# Patient Record
Sex: MALE | NOT HISPANIC OR LATINO | Employment: FULL TIME | URBAN - METROPOLITAN AREA
[De-identification: names, ages, dates, MRNs, and addresses within clinical notes are randomized per-mention and may not be internally consistent; named-entity substitution may affect disease eponyms.]

---

## 2017-01-19 ENCOUNTER — ALLSCRIPTS OFFICE VISIT (OUTPATIENT)
Dept: OTHER | Facility: OTHER | Age: 67
End: 2017-01-19

## 2017-02-22 ENCOUNTER — ALLSCRIPTS OFFICE VISIT (OUTPATIENT)
Dept: OTHER | Facility: OTHER | Age: 67
End: 2017-02-22

## 2017-04-19 ENCOUNTER — GENERIC CONVERSION - ENCOUNTER (OUTPATIENT)
Dept: OTHER | Facility: OTHER | Age: 67
End: 2017-04-19

## 2017-05-18 ENCOUNTER — HOSPITAL ENCOUNTER (OUTPATIENT)
Dept: RADIOLOGY | Facility: HOSPITAL | Age: 67
Discharge: HOME | End: 2017-05-18
Attending: ORTHOPAEDIC SURGERY
Payer: COMMERCIAL

## 2017-05-18 ENCOUNTER — TRANSCRIBE ORDERS (OUTPATIENT)
Dept: ADMINISTRATIVE | Facility: HOSPITAL | Age: 67
End: 2017-05-18

## 2017-05-18 ENCOUNTER — ALLSCRIPTS OFFICE VISIT (OUTPATIENT)
Dept: OTHER | Facility: OTHER | Age: 67
End: 2017-05-18

## 2017-05-18 DIAGNOSIS — M17.11 PRIMARY OSTEOARTHRITIS OF RIGHT KNEE: ICD-10-CM

## 2017-05-18 DIAGNOSIS — M17.12 PRIMARY OSTEOARTHRITIS OF LEFT KNEE: ICD-10-CM

## 2017-05-18 DIAGNOSIS — S46.119A STRAIN OF MUSCLE, FASCIA AND TENDON OF LONG HEAD OF BICEPS, UNSPECIFIED ARM, INITIAL ENCOUNTER: ICD-10-CM

## 2017-05-18 DIAGNOSIS — M75.22 BICIPITAL TENDINITIS OF LEFT SHOULDER: ICD-10-CM

## 2017-05-18 DIAGNOSIS — S46.112A RUPTURE OF TENDON OF BICEPS, LONG HEAD, LEFT, INITIAL ENCOUNTER: Primary | ICD-10-CM

## 2017-05-18 PROCEDURE — 73562 X-RAY EXAM OF KNEE 3: CPT

## 2017-05-25 ENCOUNTER — HOSPITAL ENCOUNTER (OUTPATIENT)
Dept: RADIOLOGY | Age: 67
Discharge: HOME | End: 2017-05-25
Payer: COMMERCIAL

## 2017-05-25 DIAGNOSIS — S46.119A STRAIN OF MUSCLE, FASCIA AND TENDON OF LONG HEAD OF BICEPS, UNSPECIFIED ARM, INITIAL ENCOUNTER: ICD-10-CM

## 2017-05-25 PROCEDURE — 73221 MRI JOINT UPR EXTREM W/O DYE: CPT

## 2017-05-26 ENCOUNTER — GENERIC CONVERSION - ENCOUNTER (OUTPATIENT)
Dept: OTHER | Facility: OTHER | Age: 67
End: 2017-05-26

## 2017-05-30 ENCOUNTER — ALLSCRIPTS OFFICE VISIT (OUTPATIENT)
Dept: OTHER | Facility: OTHER | Age: 67
End: 2017-05-30

## 2017-06-01 ENCOUNTER — APPOINTMENT (OUTPATIENT)
Dept: PHYSICAL THERAPY | Age: 67
End: 2017-06-01
Payer: COMMERCIAL

## 2017-06-01 ENCOUNTER — GENERIC CONVERSION - ENCOUNTER (OUTPATIENT)
Dept: OTHER | Facility: OTHER | Age: 67
End: 2017-06-01

## 2017-06-01 DIAGNOSIS — M75.22 BICIPITAL TENDINITIS OF LEFT SHOULDER: ICD-10-CM

## 2017-06-01 PROCEDURE — 97110 THERAPEUTIC EXERCISES: CPT

## 2017-06-01 PROCEDURE — 97162 PT EVAL MOD COMPLEX 30 MIN: CPT

## 2017-06-09 ENCOUNTER — APPOINTMENT (OUTPATIENT)
Dept: PHYSICAL THERAPY | Age: 67
End: 2017-06-09
Payer: COMMERCIAL

## 2017-06-09 PROCEDURE — 97110 THERAPEUTIC EXERCISES: CPT

## 2017-06-09 PROCEDURE — 97140 MANUAL THERAPY 1/> REGIONS: CPT

## 2017-06-12 ENCOUNTER — APPOINTMENT (OUTPATIENT)
Dept: PHYSICAL THERAPY | Age: 67
End: 2017-06-12
Payer: COMMERCIAL

## 2017-06-12 PROCEDURE — 97110 THERAPEUTIC EXERCISES: CPT

## 2017-06-12 PROCEDURE — 97140 MANUAL THERAPY 1/> REGIONS: CPT

## 2017-06-15 ENCOUNTER — APPOINTMENT (OUTPATIENT)
Dept: PHYSICAL THERAPY | Age: 67
End: 2017-06-15
Payer: COMMERCIAL

## 2017-06-20 ENCOUNTER — APPOINTMENT (OUTPATIENT)
Dept: PHYSICAL THERAPY | Age: 67
End: 2017-06-20
Payer: COMMERCIAL

## 2017-06-20 PROCEDURE — 97110 THERAPEUTIC EXERCISES: CPT

## 2017-06-20 PROCEDURE — 97140 MANUAL THERAPY 1/> REGIONS: CPT

## 2017-06-26 ENCOUNTER — ALLSCRIPTS OFFICE VISIT (OUTPATIENT)
Dept: OTHER | Facility: OTHER | Age: 67
End: 2017-06-26

## 2017-06-27 ENCOUNTER — APPOINTMENT (OUTPATIENT)
Dept: PHYSICAL THERAPY | Age: 67
End: 2017-06-27
Payer: COMMERCIAL

## 2017-06-27 ENCOUNTER — GENERIC CONVERSION - ENCOUNTER (OUTPATIENT)
Dept: OTHER | Facility: OTHER | Age: 67
End: 2017-06-27

## 2017-06-27 PROCEDURE — G8991 OTHER PT/OT GOAL STATUS: HCPCS

## 2017-06-27 PROCEDURE — 97110 THERAPEUTIC EXERCISES: CPT

## 2017-06-27 PROCEDURE — 97140 MANUAL THERAPY 1/> REGIONS: CPT

## 2017-06-27 PROCEDURE — G8990 OTHER PT/OT CURRENT STATUS: HCPCS

## 2017-08-28 ENCOUNTER — ALLSCRIPTS OFFICE VISIT (OUTPATIENT)
Dept: OTHER | Facility: OTHER | Age: 67
End: 2017-08-28

## 2017-08-28 DIAGNOSIS — Z12.5 ENCOUNTER FOR SCREENING FOR MALIGNANT NEOPLASM OF PROSTATE: ICD-10-CM

## 2017-08-28 DIAGNOSIS — Z12.11 ENCOUNTER FOR SCREENING FOR MALIGNANT NEOPLASM OF COLON: ICD-10-CM

## 2017-08-28 DIAGNOSIS — Z12.12 ENCOUNTER FOR SCREENING FOR MALIGNANT NEOPLASM OF RECTUM: ICD-10-CM

## 2017-09-05 ENCOUNTER — ALLSCRIPTS OFFICE VISIT (OUTPATIENT)
Dept: OTHER | Facility: OTHER | Age: 67
End: 2017-09-05

## 2017-09-13 ENCOUNTER — TRANSCRIBE ORDERS (OUTPATIENT)
Dept: ADMINISTRATIVE | Age: 67
End: 2017-09-13

## 2017-09-13 ENCOUNTER — APPOINTMENT (OUTPATIENT)
Dept: LAB | Age: 67
End: 2017-09-13
Payer: COMMERCIAL

## 2017-09-13 DIAGNOSIS — E78.5 OTHER AND UNSPECIFIED HYPERLIPIDEMIA: Primary | ICD-10-CM

## 2017-09-13 DIAGNOSIS — E78.5 OTHER AND UNSPECIFIED HYPERLIPIDEMIA: ICD-10-CM

## 2017-09-13 LAB
ALBUMIN SERPL BCP-MCNC: 3.4 G/DL (ref 3.5–5)
ALP SERPL-CCNC: 54 U/L (ref 46–116)
ALT SERPL W P-5'-P-CCNC: 21 U/L (ref 12–78)
ANION GAP SERPL CALCULATED.3IONS-SCNC: 5 MMOL/L (ref 4–13)
AST SERPL W P-5'-P-CCNC: 21 U/L (ref 5–45)
BASOPHILS # BLD AUTO: 0.01 THOUSANDS/ΜL (ref 0–0.1)
BASOPHILS NFR BLD AUTO: 0 % (ref 0–1)
BILIRUB SERPL-MCNC: 0.82 MG/DL (ref 0.2–1)
BUN SERPL-MCNC: 16 MG/DL (ref 5–25)
CALCIUM SERPL-MCNC: 8.8 MG/DL (ref 8.3–10.1)
CHLORIDE SERPL-SCNC: 103 MMOL/L (ref 100–108)
CHOLEST SERPL-MCNC: 159 MG/DL (ref 50–200)
CO2 SERPL-SCNC: 30 MMOL/L (ref 21–32)
CREAT SERPL-MCNC: 1.02 MG/DL (ref 0.6–1.3)
EOSINOPHIL # BLD AUTO: 0.07 THOUSAND/ΜL (ref 0–0.61)
EOSINOPHIL NFR BLD AUTO: 1 % (ref 0–6)
ERYTHROCYTE [DISTWIDTH] IN BLOOD BY AUTOMATED COUNT: 13 % (ref 11.6–15.1)
GFR SERPL CREATININE-BSD FRML MDRD: 75 ML/MIN/1.73SQ M
GLUCOSE P FAST SERPL-MCNC: 90 MG/DL (ref 65–99)
HCT VFR BLD AUTO: 45.7 % (ref 36.5–49.3)
HCV AB SER QL: NORMAL
HDLC SERPL-MCNC: 80 MG/DL (ref 40–60)
HGB BLD-MCNC: 15.4 G/DL (ref 12–17)
LDLC SERPL CALC-MCNC: 64 MG/DL (ref 0–100)
LYMPHOCYTES # BLD AUTO: 2.26 THOUSANDS/ΜL (ref 0.6–4.47)
LYMPHOCYTES NFR BLD AUTO: 34 % (ref 14–44)
MCH RBC QN AUTO: 29.7 PG (ref 26.8–34.3)
MCHC RBC AUTO-ENTMCNC: 33.7 G/DL (ref 31.4–37.4)
MCV RBC AUTO: 88 FL (ref 82–98)
MONOCYTES # BLD AUTO: 0.71 THOUSAND/ΜL (ref 0.17–1.22)
MONOCYTES NFR BLD AUTO: 11 % (ref 4–12)
NEUTROPHILS # BLD AUTO: 3.58 THOUSANDS/ΜL (ref 1.85–7.62)
NEUTS SEG NFR BLD AUTO: 54 % (ref 43–75)
NRBC BLD AUTO-RTO: 0 /100 WBCS
PLATELET # BLD AUTO: 236 THOUSANDS/UL (ref 149–390)
PMV BLD AUTO: 10.5 FL (ref 8.9–12.7)
POTASSIUM SERPL-SCNC: 4.2 MMOL/L (ref 3.5–5.3)
PROT SERPL-MCNC: 6.5 G/DL (ref 6.4–8.2)
PSA SERPL-MCNC: 1.4 NG/ML (ref 0–4)
RBC # BLD AUTO: 5.18 MILLION/UL (ref 3.88–5.62)
SODIUM SERPL-SCNC: 138 MMOL/L (ref 136–145)
T4 FREE SERPL-MCNC: 1.13 NG/DL (ref 0.76–1.46)
TRIGL SERPL-MCNC: 74 MG/DL
TSH SERPL DL<=0.05 MIU/L-ACNC: 1.64 UIU/ML (ref 0.36–3.74)
WBC # BLD AUTO: 6.65 THOUSAND/UL (ref 4.31–10.16)

## 2017-09-13 PROCEDURE — 85025 COMPLETE CBC W/AUTO DIFF WBC: CPT

## 2017-09-13 PROCEDURE — 80061 LIPID PANEL: CPT

## 2017-09-13 PROCEDURE — 80053 COMPREHEN METABOLIC PANEL: CPT

## 2017-09-13 PROCEDURE — G0103 PSA SCREENING: HCPCS

## 2017-09-13 PROCEDURE — 86803 HEPATITIS C AB TEST: CPT

## 2017-09-13 PROCEDURE — 84439 ASSAY OF FREE THYROXINE: CPT

## 2017-09-13 PROCEDURE — 84443 ASSAY THYROID STIM HORMONE: CPT

## 2017-09-13 PROCEDURE — 36415 COLL VENOUS BLD VENIPUNCTURE: CPT

## 2017-09-14 ENCOUNTER — GENERIC CONVERSION - ENCOUNTER (OUTPATIENT)
Dept: OTHER | Facility: OTHER | Age: 67
End: 2017-09-14

## 2017-10-02 ENCOUNTER — APPOINTMENT (OUTPATIENT)
Dept: LAB | Facility: HOSPITAL | Age: 67
End: 2017-10-02
Payer: COMMERCIAL

## 2017-10-02 DIAGNOSIS — Z12.12 ENCOUNTER FOR SCREENING FOR MALIGNANT NEOPLASM OF RECTUM: ICD-10-CM

## 2017-10-02 DIAGNOSIS — Z12.5 ENCOUNTER FOR SCREENING FOR MALIGNANT NEOPLASM OF PROSTATE: ICD-10-CM

## 2017-10-02 DIAGNOSIS — Z12.11 ENCOUNTER FOR SCREENING FOR MALIGNANT NEOPLASM OF COLON: ICD-10-CM

## 2017-10-02 LAB — HEMOCCULT STL QL IA: NEGATIVE

## 2017-10-02 PROCEDURE — G0328 FECAL BLOOD SCRN IMMUNOASSAY: HCPCS

## 2017-10-03 ENCOUNTER — GENERIC CONVERSION - ENCOUNTER (OUTPATIENT)
Dept: OTHER | Facility: OTHER | Age: 67
End: 2017-10-03

## 2017-10-31 ENCOUNTER — HOSPITAL ENCOUNTER (OUTPATIENT)
Dept: RADIOLOGY | Facility: HOSPITAL | Age: 67
Discharge: HOME | End: 2017-10-31
Attending: ORTHOPAEDIC SURGERY
Payer: COMMERCIAL

## 2017-10-31 ENCOUNTER — GENERIC CONVERSION - ENCOUNTER (OUTPATIENT)
Dept: OTHER | Facility: OTHER | Age: 67
End: 2017-10-31

## 2017-10-31 DIAGNOSIS — M25.562 LEFT KNEE PAIN: ICD-10-CM

## 2017-10-31 DIAGNOSIS — M17.12 PRIMARY OSTEOARTHRITIS OF LEFT KNEE: ICD-10-CM

## 2017-10-31 PROCEDURE — 73562 X-RAY EXAM OF KNEE 3: CPT

## 2017-12-09 ENCOUNTER — GENERIC CONVERSION - ENCOUNTER (OUTPATIENT)
Dept: OTHER | Facility: OTHER | Age: 67
End: 2017-12-09

## 2017-12-10 ENCOUNTER — OFFICE VISIT (OUTPATIENT)
Dept: URGENT CARE | Age: 67
End: 2017-12-10
Payer: COMMERCIAL

## 2017-12-10 PROCEDURE — 99203 OFFICE O/P NEW LOW 30 MIN: CPT | Performed by: FAMILY MEDICINE

## 2017-12-11 NOTE — PROGRESS NOTES
Assessment    1  Acute upper respiratory infection (465 9) (J06 9)   2  Acute sinusitis (461 9) (J01 90)    Plan  Acute sinusitis    · Amoxicillin-Pot Clavulanate 875-125 MG Oral Tablet (Augmentin); TAKE 1 TABLETTWICE DAILY UNTIL FINISHED  Acute upper respiratory infection    · Benzonatate 200 MG Oral Capsule; TAKE 1 CAPSULE 2-3 TIMES DAILY    Discussion/Summary  Discussion Summary:   Augmentin twice a day until finished ( please take probiotics)  Perles 2 to 3 times a day (every 8-12 hours) as needed for cough  Flonase nasal spray 1 spray in each nostril twice a day  and swish mouth with warm salt water or mouthwash  or ibuprofen ( Advil / Motrin ) as needed  follow-up with family physician as needed  Medication Side Effects Reviewed: Possible side effects of new medications were reviewed with the patient/guardian today  Understands and agrees with treatment plan: The treatment plan was reviewed with the patient/guardian  The patient/guardian understands and agrees with the treatment plan   Counseling Documentation With Imm: The patient was counseled regarding  Follow Up Instructions: Follow Up with your Primary Care Provider in 7-10 days  If your symptoms worsen, go to the nearest [place] [address] Emergency Department  Chief Complaint    1  Cough  Chief Complaint Free Text Note Form: pt reports cough and fever for 3 days, unrelieved with OTC meds      History of Present Illness  HPI: Fever, cough, sinus pressure   Hospital Based Practices Required Assessment:  Pain Assessment  the patient states they have pain  The pain is located in the cough  The patient describes the pain as aching  (on a scale of 0 to 10, the patient rates the pain at 4 )  Abuse And Domestic Violence Screen   Yes, the patient is safe at home  -- The patient states no one is hurting them  Depression And Suicide Screen  No, the patient has not had thoughts of hurting themself  No, the patient has not felt depressed in the past 7 days  Prefered Language is  [place]  Primary Language is  English  Review of Systems  Focused-Male:  Constitutional: fever, but-- as noted in HPI   ENT: sore throat-- and-- nasal discharge, but-- as noted in HPI  Cardiovascular: no complaints of slow or fast heart rate, no chest pain, no palpitations, no leg claudication or lower extremity edema  Respiratory: cough, but-- as noted in HPI  Gastrointestinal: no complaints of abdominal pain, no constipation, no nausea or vomiting, no diarrhea or bloody stools  Genitourinary: no complaints of dysuria or incontinence, no hesitancy, no nocturia, no genital lesion, no inadequacy of penile erection  Musculoskeletal: no complaints of arthralgia, no myalgia, no joint swelling or stiffness, no limb pain or swelling  Integumentary: no complaints of skin rash or lesion, no itching or dry skin, no skin wounds  Neurological: no complaints of headache, no confusion, no numbness or tingling, no dizziness or fainting  ROS Reviewed:   ROS reviewed  Active Problems  1  Acquired hypothyroidism (244 9) (E03 9)   2  Acute sinusitis, recurrence not specified, unspecified location (461 9) (J01 90)   3  Advance directive discussed with patient (V65 49) (Z71 89)   4  Biceps tendinitis, left (726 12) (M75 22)   5  Biceps tendon tear (840 8) (S46 219A)   6  Chronic GERD (530 81) (K21 9)   7  Encounter for colorectal cancer screening (V76 51) (Z12 11,Z12 12)   8  Encounter for prostate cancer screening (V76 44) (Z12 5)   9  Folliculitis (159 8) (J09 2)   10  Headache (784 0) (R51)   11  Hyperlipidemia (272 4) (E78 5)   12  Hypertension (401 9) (I10)   13  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)   14  Knee pain (719 46) (M25 569)   15  Need for hepatitis A vaccination (V05 3) (Z23)   16  Need for hepatitis C screening test (V73 89) (Z11 59)   17  Need for prophylactic vaccination and inoculation against influenza (V04 81) (Z23)   18   Need for vaccination with 13-polyvalent pneumococcal conjugate vaccine (V03 82) (Z23)   19  Osteoarthrosis, localized, primary, knee, right (715 16) (M17 11)   20  Primary localized osteoarthritis of left knee (715 16) (M17 12)   21  Rash (782 1) (R21)   22  Screening for genitourinary condition (V81 6) (Z13 89)   23  Skin macule (709 8) (L98 8)    Past Medical History  1  History of Roberts's esophagus (530 85) (K22 70)  Active Problems And Past Medical History Reviewed: The active problems and past medical history were reviewed and updated today  Family History  Family History Reviewed: The family history was reviewed and updated today  Social History     · Never a smoker   · Never smoked  Social History Reviewed: The social history was reviewed and updated today  The social history was reviewed and is unchanged  Surgical History  Surgical History Reviewed: The surgical history was reviewed and updated today  Current Meds   1  Aspirin 81 MG TABS Recorded   2  Clobetasol Propionate 0 05 % External Cream; APPLY SPARINGLY TO AFFECTED AREA(S) TWICE DAILY; Therapy: 49WWN2521 to (Last Rx:54Sps2839)  Requested for: 98Luc8118 Ordered   3  Latanoprost 0 005 % Ophthalmic Solution Recorded   4  Levothyroxine Sodium 50 MCG Oral Tablet; TAKE 1 TABLET BY MOUTH EVERY DAY ON AN EMPTY STOMACH; Therapy: 62NQF4471 to (Shalonda Medico)  Requested for: 64ECZ3949; Last Rx:27Nov2017 Ordered   5  Lipitor 10 MG Oral Tablet; TAKE 1 TABLET AT BEDTIME; Therapy: 93Twj6253 to (Evaluate:14Jun2018)  Requested for: 21HNV8980; Last Rx:19Jun2017 Ordered   6  Losartan Potassium 50 MG Oral Tablet; TAKE 1 TABLET DAILY; Therapy: 06IAJ8351 to (Evaluate:08Jso5126); Last Rx:02Oct2015 Ordered   7  NexIUM 20 MG Oral Capsule Delayed Release; Therapy: (Recorded:88Zfo3879) to Recorded   8  Rizatriptan Benzoate 10 MG Oral Tablet; TAKE 1 TABLET AT ONSET OF HEADACHE  MAY REPEAT EVERY 2 HOURS AS NEEDED   MAXIMUM 3 TABLETS IN 24 HOURS  Requested for: 27Nov2017; Last FZ:82RXG7025 Ordered   9  Zoster (Zostavax); INJECT 0 65  ML Subcutaneous; Therapy: 46Jgi2230 to (Last Rx:53Gkv0482) Ordered  Medication List Reviewed: The medication list was reviewed and updated today  Allergies    1  No Known Drug Allergies    Vitals  Signs   Recorded: 87TUF5915 09:06AM   Temperature: 97 8 F, Tympanic  Heart Rate: 96, L Radial  Pulse Quality: Regular, L Radial  Respiration Quality: Normal  Respiration: 16  Systolic: 392, LUE, Sitting  Diastolic: 80, LUE, Sitting  Height: 5 ft 2 in  Weight: 148 lb   BMI Calculated: 27 07  BSA Calculated: 1 68  O2 Saturation: 97, RA  Pain Scale: 4/10    Physical Exam   Constitutional patient appears uncomfortable  Ears, Nose, Mouth, and Throat  External inspection of ears and nose: Normal    Otoscopic examination: Tympanic membrance translucent with normal light reflex  Canals patent without erythema  -- nasal congestion, tenderness over the sinuses with palpation  -- injection of the oropharynx  Pulmonary  Respiratory effort: No increased work of breathing or signs of respiratory distress  Auscultation of lungs: Clear to auscultation  Cardiovascular  Auscultation of heart: Normal rate and rhythm, normal S1 and S2, without murmurs  Lymphatic  Palpation of lymph nodes in neck: No lymphadenopathy  Skin good color and turgor  Neurologic grossly intact, no nuchal rigidity  Psychiatric  Orientation to person, place and time: Normal    Mood and affect: Normal        Future Appointments    Date/Time Provider Specialty Site   01/30/2018 03:15 PM LEANNE Shepard  Orthopedic Surgery [facility], [address]   02/13/2018 08:00 AM LEANNE Gallardo   Internal Medicine [facility]       Signatures   Electronically signed by : Bob Bravo DO; Dec 10 2017  9:21AM EST                       (Author)

## 2017-12-12 ENCOUNTER — ALLSCRIPTS OFFICE VISIT (OUTPATIENT)
Dept: OTHER | Facility: OTHER | Age: 67
End: 2017-12-12

## 2017-12-12 ENCOUNTER — TRANSCRIBE ORDERS (OUTPATIENT)
Dept: ADMINISTRATIVE | Age: 67
End: 2017-12-12

## 2017-12-12 ENCOUNTER — APPOINTMENT (OUTPATIENT)
Dept: RADIOLOGY | Age: 67
End: 2017-12-12
Payer: COMMERCIAL

## 2017-12-12 DIAGNOSIS — J20.9 ACUTE BRONCHITIS: ICD-10-CM

## 2017-12-12 PROCEDURE — 71020 HB CHEST X-RAY 2VW FRONTAL&LATL: CPT

## 2017-12-13 ENCOUNTER — GENERIC CONVERSION - ENCOUNTER (OUTPATIENT)
Dept: OTHER | Facility: OTHER | Age: 67
End: 2017-12-13

## 2017-12-13 NOTE — PROGRESS NOTES
Assessment    1  Acute bronchitis (466 0) (J20 9)    Plan  Acute bronchitis    · Flovent  MCG/ACT Inhalation Aerosol; USE 1 PUFF TWICE DAILY  RINSE MOUTH AFTERUSE   · * XR CHEST PA & LATERAL; Status:Active; Requested for:56Zpf9761;     Discussion/Summary  Discussion Summary:   Bronchitis: will add steroid inhaler, and will get CXR  This could be the flu (without the aches), and if so, he should continue to improve on his own  Chief Complaint  Chief Complaint Free Text Note Form: The patient presents today for follow up from urgent care with URI      History of Present Illness  HPI: I reviewed on call doctor's note, and note from urgent care place for cough and sinusitis symptoms  The patient was treated with Augmentin, Tessalon Perles, and Flonase  Pt has continued with cough, and getting some yellow mucus now  No one-sided pleuritic pain  Some chills and sweats last night  Pt had a temp of 102  Pt had bad HA, felt fatigued, no body aches  Review of Systems  Complete-Male:  Constitutional: fever,-- chills-- and-- feeling tired  ENT: earache-- and-- sore throat  Cardiovascular: no chest pain  Respiratory: cough, but-- no shortness of breath  Active Problems  1  Acquired hypothyroidism (244 9) (E03 9)   2  Acute sinusitis (461 9) (J01 90)   3  Acute sinusitis, recurrence not specified, unspecified location (461 9) (J01 90)   4  Acute upper respiratory infection (465 9) (J06 9)   5  Advance directive discussed with patient (V65 49) (Z71 89)   6  Biceps tendinitis, left (726 12) (M75 22)   7  Biceps tendon tear (840 8) (S46 219A)   8  Chronic GERD (530 81) (K21 9)   9  Encounter for colorectal cancer screening (V76 51) (Z12 11,Z12 12)   10  Encounter for prostate cancer screening (V76 44) (Z12 5)   11  Folliculitis (529 3) (F74 8)   12  Headache (784 0) (R51)   13  Hyperlipidemia (272 4) (E78 5)   14  Hypertension (401 9) (I10)   15  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)   16   Knee pain (719 46) (M25 569)   17  Need for hepatitis A vaccination (V05 3) (Z23)   18  Need for hepatitis C screening test (V73 89) (Z11 59)   19  Need for prophylactic vaccination and inoculation against influenza (V04 81) (Z23)   20  Need for vaccination with 13-polyvalent pneumococcal conjugate vaccine (V03 82) (Z23)   21  Osteoarthrosis, localized, primary, knee, right (715 16) (M17 11)   22  Primary localized osteoarthritis of left knee (715 16) (M17 12)   23  Rash (782 1) (R21)   24  Screening for genitourinary condition (V81 6) (Z13 89)   25  Skin macule (709 8) (L98 8)    Past Medical History  1  History of Roberts's esophagus (530 85) (K22 70)    Social History     · Never a smoker   · Never smoked  Social History Reviewed: The social history was reviewed and updated today  Current Meds   1  Amoxicillin-Pot Clavulanate 875-125 MG Oral Tablet; TAKE 1 TABLET TWICE DAILY UNTIL FINISHED; Therapy: 43QSU5172 to (Complete:18Gdp6553)  Requested for: 51AMZ6279; Last Rx:81Tno7327 Ordered   2  Aspirin 81 MG TABS Recorded   3  Benzonatate 200 MG Oral Capsule; TAKE 1 CAPSULE 2-3 TIMES DAILY; Therapy: 64FIE1610 to (Complete:40Lrc3854)  Requested for: 43RXT9122; Last Rx:20Jwa2206 Ordered   4  Clobetasol Propionate 0 05 % External Cream; APPLY SPARINGLY TO AFFECTED AREA(S) TWICE DAILY; Therapy: 62DNB3103 to (Last Rx:10Jba5362)  Requested for: 03Ycp6075 Ordered   5  Latanoprost 0 005 % Ophthalmic Solution Recorded   6  Levothyroxine Sodium 50 MCG Oral Tablet; TAKE 1 TABLET BY MOUTH EVERY DAY ON AN EMPTY STOMACH; Therapy: 17MHQ4845 to (Latosha Rahman)  Requested for: 75EYC3725; Last Rx:27Nov2017 Ordered   7  Lipitor 10 MG Oral Tablet; TAKE 1 TABLET AT BEDTIME; Therapy: 16Jef4055 to (Evaluate:14Jun2018)  Requested for: 83BRE4379; Last Rx:19Jun2017 Ordered   8  Losartan Potassium 50 MG Oral Tablet; TAKE 1 TABLET DAILY; Therapy: 12RYQ9726 to (Evaluate:29Apr2016); Last Rx:02Oct2015 Ordered   9   NexIUM 20 MG Oral Capsule Delayed Release; Therapy: (Recorded:60Lfy0721) to Recorded   10  Rizatriptan Benzoate 10 MG Oral Tablet; TAKE 1 TABLET AT ONSET OF HEADACHE  MAY REPEAT  EVERY 2 HOURS AS NEEDED  MAXIMUM 3 TABLETS IN 24 HOURS  Requested for: 27Nov2017; Last  Rx:27Nov2017 Ordered   11  Zoster (Zostavax); INJECT 0 65  ML Subcutaneous; Therapy: 28Aug2017 to (Last Rx:01Pjg8469) Ordered  Medication List Reviewed: The medication list was reviewed and updated today  Allergies  1  No Known Drug Allergies    Vitals  Vital Signs    Recorded: 78Ivm9293 03:50PM   Temperature 98 5 F, Oral   Heart Rate 76   Respiration 16   Systolic 184, Sitting   Diastolic 72, Sitting   Height 5 ft 2 in   Weight 146 lb    BMI Calculated 26 7   BSA Calculated 1 67   O2 Saturation 96       Physical Exam   Constitutional  General appearance: No acute distress, well appearing and well nourished  Head and Face  Head and face: Normal    Eyes  Conjunctiva and lids: No erythema, swelling or discharge  Ears, Nose, Mouth, and Throat  External inspection of ears and nose: Normal    Otoscopic examination: Tympanic membranes translucent with normal light reflex  Canals patent without erythema  Nasal mucosa, septum, and turbinates: Normal without edema or erythema  Oropharynx: Normal with no erythema, edema, exudate or lesions  Neck  Neck: Supple, symmetric, trachea midline, no masses  Thyroid: Normal, no thyromegaly  Pulmonary  Respiratory effort: No increased work of breathing or signs of respiratory distress  Auscultation of lungs: Clear to auscultation  Psychiatric  Mood and affect: Normal        Future Appointments    Date/Time Provider Specialty Site   01/30/2018 03:15 PM LEANNE Reyna  Orthopedic Surgery [address]   02/13/2018 08:00 AM LEANNE Ahumada   Internal Medicine [facility]       Signatures   Electronically signed by : LEANNE Shaffer ; Dec 12 2017  4:10PM EST                       (Author)

## 2018-01-12 VITALS
BODY MASS INDEX: 26.5 KG/M2 | DIASTOLIC BLOOD PRESSURE: 84 MMHG | SYSTOLIC BLOOD PRESSURE: 152 MMHG | HEIGHT: 62 IN | HEART RATE: 53 BPM | WEIGHT: 144 LBS

## 2018-01-12 VITALS
HEIGHT: 62 IN | SYSTOLIC BLOOD PRESSURE: 129 MMHG | HEART RATE: 62 BPM | DIASTOLIC BLOOD PRESSURE: 79 MMHG | WEIGHT: 143 LBS | BODY MASS INDEX: 26.31 KG/M2

## 2018-01-12 NOTE — RESULT NOTES
Verified Results  * MRI SHOULDER LEFT WO CONTRAST 99ZPX1898 09:52AM Uri Horowitz Order Number: [id number]   Performing Comments: please evaluate proximal biceps tendon for tear   - Patient Instructions: To schedule this appointment, please contact Central Scheduling at [phone]  Test Name Result Flag Reference   MRI SHOULDER LEFT WO CONTRAST (Report)     MRI LEFT SHOULDER     INDICATION: S46 119A: Strain of muscle, fascia and tendon of long head of biceps, unspecified arm, initial encounter  History taken directly from the electronic ordering system  Please evaluate for proximal biceps tendon for tear  COMPARISON: Left shoulder MR from 8/10/2008  TECHNIQUE:  The following MR sequences were obtained of the left shoulder: Localizer, axial GRE/PD fat sat, oblique coronal T2 fat sat, oblique sagittal T1/T2 fat sat  Images were acquired on a 1 5 Susi unit  Gadolinium was not used  FINDINGS:     SUBCUTANEOUS TISSUES: Normal     JOINT EFFUSION: None  ACROMION PROCESS: Normal      ROTATOR CUFF: There is mild supraspinatus tendinosis without tear  Subscapularis, teres minor, infraspinatus are normal      SUBACROMIAL/SUBDELTOID BURSA: Normal       LONG HEAD OF BICEPS TENDON: Normal      GLENOID LABRUM: Intact  GLENOHUMERAL JOINT: Intact  ACROMIOCLAVICULAR JOINT: Normal      BONE MARROW SIGNAL: Normal        IMPRESSION:     Mild supraspinatus tendinosis without rotator cuff tear  There is no evidence of long head of biceps tendon pathology         Workstation performed: [id number]     Signed by:   Ev Cota MD   5/25/17

## 2018-01-13 VITALS
DIASTOLIC BLOOD PRESSURE: 76 MMHG | SYSTOLIC BLOOD PRESSURE: 131 MMHG | WEIGHT: 148 LBS | BODY MASS INDEX: 27.07 KG/M2 | HEART RATE: 75 BPM

## 2018-01-13 VITALS
TEMPERATURE: 97.9 F | SYSTOLIC BLOOD PRESSURE: 130 MMHG | WEIGHT: 144 LBS | BODY MASS INDEX: 26.5 KG/M2 | RESPIRATION RATE: 16 BRPM | OXYGEN SATURATION: 98 % | HEART RATE: 57 BPM | DIASTOLIC BLOOD PRESSURE: 78 MMHG | HEIGHT: 62 IN

## 2018-01-13 VITALS
HEART RATE: 62 BPM | WEIGHT: 140.05 LBS | RESPIRATION RATE: 16 BRPM | BODY MASS INDEX: 25.77 KG/M2 | HEIGHT: 62 IN | DIASTOLIC BLOOD PRESSURE: 76 MMHG | TEMPERATURE: 98.3 F | OXYGEN SATURATION: 96 % | SYSTOLIC BLOOD PRESSURE: 122 MMHG

## 2018-01-14 VITALS
BODY MASS INDEX: 26.13 KG/M2 | WEIGHT: 142 LBS | SYSTOLIC BLOOD PRESSURE: 117 MMHG | DIASTOLIC BLOOD PRESSURE: 67 MMHG | HEIGHT: 62 IN | HEART RATE: 68 BPM

## 2018-01-16 NOTE — RESULT NOTES
Discussion/Summary   labs are all excellent     Verified Results  (1) CBC/PLT/DIFF 65Lvq9346 08:27AM Gino Hairston     Test Name Result Flag Reference   WBC COUNT 6 65 Thousand/uL  4 31-10 16   RBC COUNT 5 18 Million/uL  3 88-5 62   HEMOGLOBIN 15 4 g/dL  12 0-17 0   HEMATOCRIT 45 7 %  36 5-49 3   MCV 88 fL  82-98   MCH 29 7 pg  26 8-34 3   MCHC 33 7 g/dL  31 4-37 4   RDW 13 0 %  11 6-15 1   MPV 10 5 fL  8 9-12 7   PLATELET COUNT 714 Thousands/uL  149-390   nRBC AUTOMATED 0 /100 WBCs     NEUTROPHILS RELATIVE PERCENT 54 %  43-75   LYMPHOCYTES RELATIVE PERCENT 34 %  14-44   MONOCYTES RELATIVE PERCENT 11 %  4-12   EOSINOPHILS RELATIVE PERCENT 1 %  0-6   BASOPHILS RELATIVE PERCENT 0 %  0-1   NEUTROPHILS ABSOLUTE COUNT 3 58 Thousands/? ??L  1 85-7 62   LYMPHOCYTES ABSOLUTE COUNT 2 26 Thousands/? ??L  0 60-4 47   MONOCYTES ABSOLUTE COUNT 0 71 Thousand/? ??L  0 17-1 22   EOSINOPHILS ABSOLUTE COUNT 0 07 Thousand/? ??L  0 00-0 61   BASOPHILS ABSOLUTE COUNT 0 01 Thousands/? ??L  0 00-0 10   This is a patient instruction: This test is non-fasting  Please drink two glasses of water morning of bloodwork  (1) PSA (SCREEN) (Dx V76 44 Screen for Prostate Cancer) 13Sep2017 08:27AM Gino Hairston     Test Name Result Flag Reference   PROSTATE SPECIFIC ANTIGEN 1 4 ng/mL  0 0-4 0   American Urological Association Guidelines define biochemical recurrence of prostate cancer as a detectable or rising PSA value post-radical prostatectomy that is greater than or equal to 0 2 ng/mL with a second confirmatory level of greater than or equal to 0 2 ng/mL  This is a patient instruction: This test is non-fasting  Please drink two glasses of water morning of bloodwork  (1) T4, FREE 20Lmj4262 08:27AM Ginooscar Hairston     Test Name Result Flag Reference   T4,FREE 1 13 ng/dL  0 76-1 46   Specimen collection should occur prior to Sulfasalazine administration due to the potential for falsely elevated results       (1) COMPREHENSIVE METABOLIC PANEL 16LAI8685 08:27AM [facility]     Test Name Result Flag Reference   SODIUM 138 mmol/L  136-145   POTASSIUM 4 2 mmol/L  3 5-5 3   CHLORIDE 103 mmol/L  100-108   CARBON DIOXIDE 30 mmol/L  21-32   ANION GAP (CALC) 5 mmol/L  4-13   BLOOD UREA NITROGEN 16 mg/dL  5-25   CREATININE 1 02 mg/dL  0 60-1 30   Standardized to IDMS reference method   CALCIUM 8 8 mg/dL  8 3-10 1   BILI, TOTAL 0 82 mg/dL  0 20-1 00   ALK PHOSPHATAS 54 U/L     ALT (SGPT) 21 U/L  12-78   Specimen collection should occur prior to Sulfasalazine and/or Sulfapyridine administration due to the potential for falsely depressed results  AST(SGOT) 21 U/L  5-45   Specimen collection should occur prior to Sulfasalazine administration due to the potential for falsely depressed results  ALBUMIN 3 4 g/dL L 3 5-5 0   TOTAL PROTEIN 6 5 g/dL  6 4-8 2   eGFR 75 ml/min/1 73sq [facility] Disease Education Program recommendations are as follows:  GFR calculation is accurate only with a steady state creatinine  Chronic Kidney disease less than 60 ml/min/1 73 sq  meters  Kidney failure less than 15 ml/min/1 73 sq  meters  GLUCOSE FASTING 90 mg/dL  65-99   Specimen collection should occur prior to Sulfasalazine administration due to the potential for falsely depressed results  Specimen collection should occur prior to Sulfapyridine administration due to the potential for falsely elevated results  (1) LIPID PANEL, FASTING 17Dba4674 08:27AM [facility]     Test Name Result Flag Reference   CHOLESTEROL 159 mg/dL     HDL,DIRECT 80 mg/dL H 40-60   Specimen collection should occur prior to Metamizole administration due to the potential for falsley depressed results  LDL CHOLESTEROL CALCULATED 64 mg/dL  0-100   This is a patient instruction:  This is a fasting test  Water, black tea or black coffee only after 9:00pm the night before the test  Drink 2 glasses of water the morning of the test         Triglyceride:        Normal ??? ??? ??? ??? ??? ??? ??? <150 mg/dl   ??? ??? ???Borderline High ??? ??? 150-199 mg/dl   ??? ??? ? ?? High ??? ??? ??? ??? ??? ??? ??? 200-499 mg/dl   ??? ??? ? ??Very High ??? ??? ??? ??? ??? >499 mg/dl      Cholesterol:       Desirable ??? ??? ??? ??? <200 mg/dl   ??? ??? Borderline High ??? 200-239 mg/dl   ??? ??? High ??? ??? ??? ??? ??? ??? >239 mg/dl      HDL Cholesterol:       High ??? ???>59 mg/dL   ??? ??? Low ??? ??? <41 mg/dL      This screening LDL is a calculated result  It does not have the accuracy of the Direct Measured LDL in the monitoring of patients with hyperlipidemia and/or statin therapy  Direct Measure LDL (LEA336) must be ordered separately in these patients  TRIGLYCERIDES 74 mg/dL  <=150   Specimen collection should occur prior to N-Acetylcysteine or Metamizole administration due to the potential for falsely depressed results  (1) TSH 09Yoz4153 08:27AM [employer] [facility]     Test Name Result Flag Reference   TSH 1 640 uIU/mL  0 358-3 740   This is a patient instruction: This test is non-fasting  Please drink two glasses of water morning of bloodwork  Patients undergoing fluorescein dye angiography may retain small amounts of fluorescein in the body for 48-72 hours post procedure  Samples containing fluorescein can produce falsely depressed TSH values  If the patient had this procedure,a specimen should be resubmitted post fluorescein clearance       (1) HEP C ANTIBODY 44Wsn2575 08:27AM [facility]     Test Name Result Flag Reference   HEPATITIS C ANTIBODY Non-reactive  Non-reactive

## 2018-01-18 NOTE — RESULT NOTES
Discussion/Summary   stool test normal     Verified Results  (1) OCCULT BLOOD, FECAL IMMUNOCHEMICAL TEST 60DUW0921 02:27PM Yvonne Bernstein Order Number: [id number]     Test Name Result Flag Reference   OCCULT BLD, FECAL IMMUNOLOGICAL Negative  Negative   Performed by Fecal Immunochemical Test

## 2018-01-22 VITALS
SYSTOLIC BLOOD PRESSURE: 134 MMHG | BODY MASS INDEX: 27.23 KG/M2 | WEIGHT: 148 LBS | HEART RATE: 74 BPM | DIASTOLIC BLOOD PRESSURE: 81 MMHG | HEIGHT: 62 IN

## 2018-01-23 VITALS
SYSTOLIC BLOOD PRESSURE: 140 MMHG | BODY MASS INDEX: 27.23 KG/M2 | DIASTOLIC BLOOD PRESSURE: 80 MMHG | RESPIRATION RATE: 16 BRPM | WEIGHT: 148 LBS | OXYGEN SATURATION: 97 % | TEMPERATURE: 97.8 F | HEIGHT: 62 IN | HEART RATE: 96 BPM

## 2018-01-23 VITALS
HEIGHT: 62 IN | DIASTOLIC BLOOD PRESSURE: 72 MMHG | BODY MASS INDEX: 26.87 KG/M2 | RESPIRATION RATE: 16 BRPM | OXYGEN SATURATION: 96 % | SYSTOLIC BLOOD PRESSURE: 132 MMHG | TEMPERATURE: 98.5 F | WEIGHT: 146 LBS | HEART RATE: 76 BPM

## 2018-01-23 NOTE — MISCELLANEOUS
Message  Message Free Text Note Form: 70 y/o M called on-call for 2 days of dry, continuous cough    "I never had cough like this before"  no SOB, fever but having some chills/sweats  rec'd flu vaccine '3 days ago' but on 12/3 per chart    wife has recently been sick with similar sx but she has gotten better  no hx of lung problems, flu exposure or asthma in past    taking delsym and other OTC cough remedies with limited benefit  pt coughing during phone call  no other complaints    plan - advised to go to /walk-in care for eval, flu testing +/- CXR and/or add'l treatment for cough based on evaluation      Signatures   Electronically signed by : Jesus Fitzpatrick DO; Dec  9 2017  1:58PM EST                       (Author)

## 2018-01-23 NOTE — RESULT NOTES
Discussion/Summary   CXR completely normal     Verified Results  * XR CHEST PA & LATERAL 86Biw2924 04:40PM Robyn [facility] Order Number: [id number]     Test Name Result Flag Reference   XR CHEST PA & LATERAL (Report)     CHEST      INDICATION: J20 9: Acute bronchitis, unspecified  History taken directly from the electronic ordering system  Productive cough     COMPARISON: 10/18/2011     VIEWS: Frontal and lateral projections     IMAGES: 2     FINDINGS:        Cardiomediastinal silhouette appears unremarkable  The lungs are clear  No pneumothorax or pleural effusion  There is a remote fracture deformity of the left clavicle       IMPRESSION:     No active pulmonary disease         Workstation performed: [id number]     Signed by:   Misael Ribera MD   12/13/17

## 2018-02-13 ENCOUNTER — OFFICE VISIT (OUTPATIENT)
Dept: INTERNAL MEDICINE CLINIC | Facility: CLINIC | Age: 68
End: 2018-02-13
Payer: COMMERCIAL

## 2018-02-13 VITALS
OXYGEN SATURATION: 99 % | BODY MASS INDEX: 26.57 KG/M2 | DIASTOLIC BLOOD PRESSURE: 70 MMHG | HEART RATE: 65 BPM | WEIGHT: 144.4 LBS | RESPIRATION RATE: 16 BRPM | TEMPERATURE: 97.9 F | HEIGHT: 62 IN | SYSTOLIC BLOOD PRESSURE: 119 MMHG

## 2018-02-13 DIAGNOSIS — K22.70 BARRETT'S ESOPHAGUS WITHOUT DYSPLASIA: Primary | ICD-10-CM

## 2018-02-13 DIAGNOSIS — R05.9 COUGH: ICD-10-CM

## 2018-02-13 DIAGNOSIS — I10 HYPERTENSION, UNSPECIFIED TYPE: ICD-10-CM

## 2018-02-13 DIAGNOSIS — E78.5 HYPERLIPIDEMIA, UNSPECIFIED HYPERLIPIDEMIA TYPE: ICD-10-CM

## 2018-02-13 DIAGNOSIS — E03.9 ACQUIRED HYPOTHYROIDISM: ICD-10-CM

## 2018-02-13 DIAGNOSIS — J32.9 CHRONIC SINUSITIS, UNSPECIFIED LOCATION: ICD-10-CM

## 2018-02-13 DIAGNOSIS — K21.9 CHRONIC GERD: ICD-10-CM

## 2018-02-13 PROBLEM — R51.9 HEADACHE: Status: ACTIVE | Noted: 2017-06-23

## 2018-02-13 PROCEDURE — 99214 OFFICE O/P EST MOD 30 MIN: CPT | Performed by: INTERNAL MEDICINE

## 2018-02-13 RX ORDER — TIMOLOL MALEATE 5 MG/ML
1 SOLUTION/ DROPS OPHTHALMIC 2 TIMES DAILY
COMMUNITY
Start: 2017-11-27 | End: 2019-11-26 | Stop reason: ALTCHOICE

## 2018-02-13 RX ORDER — FLUTICASONE PROPIONATE 50 MCG
1 SPRAY, SUSPENSION (ML) NASAL DAILY
Qty: 16 G | Refills: 5 | Status: SHIPPED | OUTPATIENT
Start: 2018-02-13 | End: 2018-12-06 | Stop reason: ALTCHOICE

## 2018-02-13 NOTE — ASSESSMENT & PLAN NOTE
Continue proton pump inhibitor, discussed with patient that as he pointed out cough can be worse with GERD acting up

## 2018-02-13 NOTE — ASSESSMENT & PLAN NOTE
Sounds like he is having his migraines worsened by recent sinus symptoms, other sugar maybe some increased stress from change in autonomy  with new job  Will add nasal steroid inhaler because he has some sinus tenderness and more pain when he bends over  If headaches not improving, will consider preventative medication for migraines, but I expect them to improve with the above measures

## 2018-02-13 NOTE — PROGRESS NOTES
Assessment/Plan:    Roberts's esophagus    Continue proton pump inhibitor, and follow up Dr Jacey Grant for surveillance  Chronic GERD    Continue proton pump inhibitor, discussed with patient that as he pointed out cough can be worse with GERD acting up  Acquired hypothyroidism    Continue medication    Hypertension   Controlled, continue med    Hyperlipidemia   Continue statin, and continue to watch diet  Headache    Sounds like he is having his migraines worsened by recent sinus symptoms, other sugar maybe some increased stress from change in autonomy  with new job  Will add nasal steroid inhaler because he has some sinus tenderness and more pain when he bends over  If headaches not improving, will consider preventative medication for migraines, but I expect them to improve with the above measures  Cough   Lung exam is normal, most likely etiologies combination of his GERD and some more recent sinus symptoms  Discussed further workup if symptoms not improving with above measures  Of note chest x-ray December 12, 2017 was normal   Follow Pulmonary if not improving  Diagnoses and all orders for this visit:    Roberts's esophagus without dysplasia    Chronic GERD    Acquired hypothyroidism    Hypertension, unspecified type    Hyperlipidemia, unspecified hyperlipidemia type    Cough    Chronic sinusitis, unspecified location  -     fluticasone (FLONASE) 50 mcg/act nasal spray; 1 spray into each nostril daily    Other orders  -     timolol (TIMOPTIC) 0 5 % ophthalmic solution;           Subjective:      Patient ID: Nilsa Hendrickson is a 69 y o  male  Regarding hypertension, hypercholesterolemia, hypothyroidism, patient tolerating meds without any adverse side effects, and he reports compliance with meds  Cough   This is a new problem  The current episode started 1 to 4 weeks ago (about 2 weeks)  The problem has been unchanged  The problem occurs every few hours  The cough is non-productive  Associated symptoms include headaches, heartburn ( and coughSeemed to improve a little after restarting proton pump inhibitor), nasal congestion (just over the past few days), postnasal drip and rhinorrhea  Pertinent negatives include no chest pain, chills, fever, hemoptysis, rash, sore throat, shortness of breath or wheezing  He has tried OTC cough suppressant (Proton pump inhibitor) for the symptoms  The treatment provided mild relief  GERD with Roberts's   Headache    This is a new problem  The current episode started in the past 7 days  The problem occurs daily  Pain location: Right side above eyebrow  Radiates to: Top of her right head  The pain quality is similar to prior headaches (like his migraines in the past)  The quality of the pain is described as aching and throbbing  The pain is mild  Associated symptoms include coughing, nausea, photophobia, rhinorrhea and sinus pressure  Pertinent negatives include no abdominal pain, dizziness, eye pain, fever, sore throat, visual change or vomiting  Exacerbated by: light and noise  He has tried NSAIDs for the symptoms  The treatment provided mild relief  His past medical history is significant for migraine headaches  (GERD with Roberts's)       The following portions of the patient's history were reviewed and updated as appropriate: allergies, current medications, past family history, past medical history, past social history, past surgical history and problem list     No family history on file  Past Medical History:   Diagnosis Date    Roberts's esophagus      Social History     Social History    Marital status: /Civil Union     Spouse name: N/A    Number of children: N/A    Years of education: N/A     Occupational History    Not on file       Social History Main Topics    Smoking status: Never Smoker    Smokeless tobacco: Never Used    Alcohol use Not on file    Drug use: Unknown    Sexual activity: Not on file     Other Topics Concern    Not on file     Social History Narrative    No narrative on file       Current Outpatient Prescriptions:     aspirin 81 MG tablet, Take by mouth, Disp: , Rfl:     atorvastatin (LIPITOR) 10 mg tablet, Take 1 tablet by mouth, Disp: , Rfl:     latanoprost (XALATAN) 0 005 % ophthalmic solution, Apply to eye, Disp: , Rfl:     levothyroxine 50 mcg tablet, Take 1 tablet by mouth, Disp: , Rfl:     losartan (COZAAR) 50 mg tablet, Take 1 tablet by mouth daily, Disp: , Rfl:     rizatriptan (MAXALT) 10 MG tablet, Take 1 tablet by mouth, Disp: , Rfl:     timolol (TIMOPTIC) 0 5 % ophthalmic solution, , Disp: , Rfl:     fluticasone (FLONASE) 50 mcg/act nasal spray, 1 spray into each nostril daily, Disp: 16 g, Rfl: 5    fluticasone (FLOVENT HFA) 220 mcg/act inhaler, Inhale 1 puff 2 (two) times a day, Disp: , Rfl:   No Known Allergies    Review of Systems   Constitutional: Negative for chills, fatigue and fever  HENT: Positive for postnasal drip, rhinorrhea and sinus pressure  Negative for congestion, nosebleeds and sore throat  Eyes: Positive for photophobia  Negative for pain and visual disturbance  Respiratory: Positive for cough  Negative for hemoptysis, chest tightness, shortness of breath and wheezing  Cardiovascular: Negative for chest pain, palpitations and leg swelling  Gastrointestinal: Positive for heartburn ( and coughSeemed to improve a little after restarting proton pump inhibitor) and nausea  Negative for abdominal pain, constipation, diarrhea and vomiting  Endocrine: Negative for polydipsia and polyuria  Genitourinary: Negative for dysuria, flank pain and hematuria  Musculoskeletal: Negative for arthralgias  Skin: Negative for rash  Neurological: Positive for headaches  Negative for dizziness and tremors  Hematological: Does not bruise/bleed easily  Psychiatric/Behavioral: Negative for confusion and dysphoric mood  The patient is not nervous/anxious            Objective:    Vitals: 02/13/18 0806   BP: 119/70   Pulse: 65   Resp: 16   Temp: 97 9 °F (36 6 °C)   SpO2: 99%        Physical Exam   Constitutional: He is oriented to person, place, and time  He appears well-developed and well-nourished  No distress  HENT:   Head: Normocephalic and atraumatic  Right Ear: External ear normal    Left Ear: External ear normal    Nose: Right sinus exhibits maxillary sinus tenderness  Left sinus exhibits maxillary sinus tenderness  Mouth/Throat: Oropharynx is clear and moist  No oropharyngeal exudate  Eyes: Conjunctivae are normal  No scleral icterus  Neck: Normal range of motion  Neck supple  No tracheal deviation present  No thyromegaly present  Cardiovascular: Normal rate, regular rhythm and normal heart sounds  No murmur heard  Pulmonary/Chest: Effort normal and breath sounds normal  No respiratory distress  He has no wheezes  He has no rales  Abdominal: Soft  Bowel sounds are normal  There is no tenderness  There is no rebound and no guarding  Musculoskeletal: He exhibits no edema  Lymphadenopathy:     He has no cervical adenopathy  Neurological: He is alert and oriented to person, place, and time  Psychiatric: He has a normal mood and affect  His behavior is normal  Judgment and thought content normal    Vitals reviewed

## 2018-02-13 NOTE — ASSESSMENT & PLAN NOTE
Lung exam is normal, most likely etiologies combination of his GERD and some more recent sinus symptoms  Discussed further workup if symptoms not improving with above measures  Of note chest x-ray December 12, 2017 was normal   Follow Pulmonary if not improving

## 2018-02-15 ENCOUNTER — OFFICE VISIT (OUTPATIENT)
Dept: INTERNAL MEDICINE CLINIC | Facility: CLINIC | Age: 68
End: 2018-02-15
Payer: COMMERCIAL

## 2018-02-15 DIAGNOSIS — Z23 NEED FOR TDAP VACCINATION: Primary | ICD-10-CM

## 2018-02-15 PROCEDURE — 90715 TDAP VACCINE 7 YRS/> IM: CPT

## 2018-02-15 PROCEDURE — 90471 IMMUNIZATION ADMIN: CPT

## 2018-07-11 DIAGNOSIS — E78.2 MIXED HYPERLIPIDEMIA: Primary | ICD-10-CM

## 2018-07-11 RX ORDER — ATORVASTATIN CALCIUM 10 MG/1
10 TABLET, FILM COATED ORAL DAILY
Qty: 90 TABLET | Refills: 1 | Status: SHIPPED | OUTPATIENT
Start: 2018-07-11 | End: 2019-04-05 | Stop reason: SDUPTHER

## 2018-09-25 ENCOUNTER — TRANSCRIBE ORDERS (OUTPATIENT)
Dept: ADMINISTRATIVE | Age: 68
End: 2018-09-25

## 2018-09-25 ENCOUNTER — APPOINTMENT (OUTPATIENT)
Dept: LAB | Age: 68
End: 2018-09-25
Payer: COMMERCIAL

## 2018-09-25 DIAGNOSIS — E78.49 FAMILIAL COMBINED HYPERLIPIDEMIA: Primary | ICD-10-CM

## 2018-09-25 DIAGNOSIS — E78.49 FAMILIAL COMBINED HYPERLIPIDEMIA: ICD-10-CM

## 2018-09-25 LAB
ALBUMIN SERPL BCP-MCNC: 3.6 G/DL (ref 3.5–5)
ALP SERPL-CCNC: 56 U/L (ref 46–116)
ALT SERPL W P-5'-P-CCNC: 23 U/L (ref 12–78)
ANION GAP SERPL CALCULATED.3IONS-SCNC: 4 MMOL/L (ref 4–13)
AST SERPL W P-5'-P-CCNC: 25 U/L (ref 5–45)
BILIRUB SERPL-MCNC: 0.78 MG/DL (ref 0.2–1)
BUN SERPL-MCNC: 15 MG/DL (ref 5–25)
CALCIUM SERPL-MCNC: 8.7 MG/DL (ref 8.3–10.1)
CHLORIDE SERPL-SCNC: 103 MMOL/L (ref 100–108)
CHOLEST SERPL-MCNC: 149 MG/DL (ref 50–200)
CO2 SERPL-SCNC: 28 MMOL/L (ref 21–32)
CREAT SERPL-MCNC: 1.04 MG/DL (ref 0.6–1.3)
ERYTHROCYTE [DISTWIDTH] IN BLOOD BY AUTOMATED COUNT: 12.4 % (ref 11.6–15.1)
GFR SERPL CREATININE-BSD FRML MDRD: 73 ML/MIN/1.73SQ M
GLUCOSE P FAST SERPL-MCNC: 89 MG/DL (ref 65–99)
HCT VFR BLD AUTO: 45.5 % (ref 36.5–49.3)
HDLC SERPL-MCNC: 64 MG/DL (ref 40–60)
HGB BLD-MCNC: 15.3 G/DL (ref 12–17)
LDLC SERPL CALC-MCNC: 75 MG/DL (ref 0–100)
LDLC SERPL DIRECT ASSAY-MCNC: 86 MG/DL (ref 0–100)
MCH RBC QN AUTO: 29.7 PG (ref 26.8–34.3)
MCHC RBC AUTO-ENTMCNC: 33.6 G/DL (ref 31.4–37.4)
MCV RBC AUTO: 88 FL (ref 82–98)
NONHDLC SERPL-MCNC: 85 MG/DL
PLATELET # BLD AUTO: 227 THOUSANDS/UL (ref 149–390)
PMV BLD AUTO: 10.5 FL (ref 8.9–12.7)
POTASSIUM SERPL-SCNC: 4 MMOL/L (ref 3.5–5.3)
PROT SERPL-MCNC: 6.6 G/DL (ref 6.4–8.2)
RBC # BLD AUTO: 5.15 MILLION/UL (ref 3.88–5.62)
SODIUM SERPL-SCNC: 135 MMOL/L (ref 136–145)
TRIGL SERPL-MCNC: 52 MG/DL
TSH SERPL DL<=0.05 MIU/L-ACNC: 2.19 UIU/ML (ref 0.36–3.74)
WBC # BLD AUTO: 4.88 THOUSAND/UL (ref 4.31–10.16)

## 2018-09-25 PROCEDURE — 36415 COLL VENOUS BLD VENIPUNCTURE: CPT

## 2018-09-25 PROCEDURE — 80061 LIPID PANEL: CPT

## 2018-09-25 PROCEDURE — 85027 COMPLETE CBC AUTOMATED: CPT

## 2018-09-25 PROCEDURE — 80053 COMPREHEN METABOLIC PANEL: CPT

## 2018-09-25 PROCEDURE — 83721 ASSAY OF BLOOD LIPOPROTEIN: CPT

## 2018-09-25 PROCEDURE — 84443 ASSAY THYROID STIM HORMONE: CPT

## 2018-10-16 ENCOUNTER — OFFICE VISIT (OUTPATIENT)
Dept: OBGYN CLINIC | Facility: HOSPITAL | Age: 68
End: 2018-10-16
Payer: COMMERCIAL

## 2018-10-16 VITALS
SYSTOLIC BLOOD PRESSURE: 139 MMHG | WEIGHT: 152 LBS | HEIGHT: 62 IN | DIASTOLIC BLOOD PRESSURE: 71 MMHG | HEART RATE: 65 BPM | BODY MASS INDEX: 27.97 KG/M2

## 2018-10-16 DIAGNOSIS — M17.0 BILATERAL PRIMARY OSTEOARTHRITIS OF KNEE: Primary | ICD-10-CM

## 2018-10-16 PROCEDURE — 99213 OFFICE O/P EST LOW 20 MIN: CPT | Performed by: ORTHOPAEDIC SURGERY

## 2018-10-16 PROCEDURE — 20610 DRAIN/INJ JOINT/BURSA W/O US: CPT | Performed by: ORTHOPAEDIC SURGERY

## 2018-10-16 RX ADMIN — BUPIVACAINE HYDROCHLORIDE 2 ML: 2.5 INJECTION, SOLUTION INFILTRATION; PERINEURAL at 18:02

## 2018-10-16 RX ADMIN — BUPIVACAINE HYDROCHLORIDE 2 ML: 2.5 INJECTION, SOLUTION INFILTRATION; PERINEURAL at 18:00

## 2018-10-16 RX ADMIN — LIDOCAINE HYDROCHLORIDE 1 ML: 10 INJECTION, SOLUTION INFILTRATION; PERINEURAL at 18:02

## 2018-10-16 RX ADMIN — LIDOCAINE HYDROCHLORIDE 1 ML: 10 INJECTION, SOLUTION INFILTRATION; PERINEURAL at 18:00

## 2018-10-16 RX ADMIN — BETAMETHASONE SODIUM PHOSPHATE AND BETAMETHASONE ACETATE 6 MG: 3; 3 INJECTION, SUSPENSION INTRA-ARTICULAR; INTRALESIONAL; INTRAMUSCULAR; SOFT TISSUE at 18:00

## 2018-10-16 RX ADMIN — BETAMETHASONE SODIUM PHOSPHATE AND BETAMETHASONE ACETATE 6 MG: 3; 3 INJECTION, SUSPENSION INTRA-ARTICULAR; INTRALESIONAL; INTRAMUSCULAR; SOFT TISSUE at 18:02

## 2018-10-16 NOTE — PROGRESS NOTES
69 y o male presents for continuing care for bilateral knee osteoarthritis  He has gained significant relief from corticosteroid injections in the past for which she obtains his knees become symptomatic  Currently denies motor sensory deficits  He states that his pain is exacerbated with certain motions which causes pain to radiate from thejoint to the area just proximal to the knee joint  He currently has no interest in any surgical intervention of any kind and did not gain any relief from the Toradol study  Review of Systems  Review of systems negative unless otherwise specified in HPI    Past Medical History  Past Medical History:   Diagnosis Date    Roberts's esophagus        Past Surgical History  Past Surgical History:   Procedure Laterality Date    CATARACT EXTRACTION         Current Medications  Current Outpatient Prescriptions on File Prior to Visit   Medication Sig Dispense Refill    aspirin 81 MG tablet Take by mouth      atorvastatin (LIPITOR) 10 mg tablet Take 1 tablet (10 mg total) by mouth daily 90 tablet 1    fluticasone (FLONASE) 50 mcg/act nasal spray 1 spray into each nostril daily 16 g 5    fluticasone (FLOVENT HFA) 220 mcg/act inhaler Inhale 1 puff 2 (two) times a day      latanoprost (XALATAN) 0 005 % ophthalmic solution Apply to eye      levothyroxine 50 mcg tablet Take 1 tablet by mouth      losartan (COZAAR) 50 mg tablet Take 1 tablet by mouth daily      rizatriptan (MAXALT) 10 MG tablet Take 1 tablet by mouth      timolol (TIMOPTIC) 0 5 % ophthalmic solution        No current facility-administered medications on file prior to visit          Recent Labs [facility])    0  Lab Value Date/Time   HCT 45 5 09/25/2018 0803   HGB 15 3 09/25/2018 0803   WBC 4 88 09/25/2018 0803         Physical exam  General: Awake, Alert, Oriented  HEENT: No scleral injection, no evidence of facial trauma  Heart: Extremities warm and well perfused  Lungs: No audible wheezing  ·   bilateral Knee exam  · No joint line tenderness  · No effusion bilaterally  · Negative Julian's, negative lachmans  · Extremities warm and well perfused    Procedure  Large joint arthrocentesis  Date/Time: 10/16/2018 6:00 PM  Consent given by: patient  Supporting Documentation  Indications: pain   Procedure Details  Location: knee - R knee  Needle size: 22 G  Approach: anterolateral  Medications administered: 2 mL bupivacaine 0 25 %; 1 mL lidocaine 1 %; 6 mg betamethasone acetate-betamethasone sodium phosphate 6 (3-3) mg/mL    Dressing:  Sterile dressing applied  Large joint arthrocentesis  Date/Time: 10/16/2018 6:02 PM  Supporting Documentation  Indications: pain   Procedure Details  Location: knee -   Needle size: 22 G  Approach: anterolateral  Medications administered: 2 mL bupivacaine 0 25 %; 1 mL lidocaine 1 %; 6 mg betamethasone acetate-betamethasone sodium phosphate 6 (3-3) mg/mL    Dressing:  Sterile dressing applied          Imaging  No new imaging obtained today    Assessment and plan:  38-year-old male with return of symptomatic bilateral knee osteoarthritis symptoms status post repeat corticosteroid injections to bilateral knees today, tolerated well     Plan:  Weightbear as tolerated bilateral lower extremities, no activity restrictions  Oral analgesics over-the-counter as needed  Follow-up in 3 months if additional corticosteroid injections required    Keiry Zuleta  10/16/18

## 2018-10-23 ENCOUNTER — TELEPHONE (OUTPATIENT)
Dept: INTERNAL MEDICINE CLINIC | Facility: CLINIC | Age: 68
End: 2018-10-23

## 2018-10-23 ENCOUNTER — OFFICE VISIT (OUTPATIENT)
Dept: INTERNAL MEDICINE CLINIC | Facility: CLINIC | Age: 68
End: 2018-10-23
Payer: COMMERCIAL

## 2018-10-23 VITALS
HEIGHT: 62 IN | SYSTOLIC BLOOD PRESSURE: 112 MMHG | WEIGHT: 147.6 LBS | TEMPERATURE: 98.3 F | HEART RATE: 75 BPM | DIASTOLIC BLOOD PRESSURE: 63 MMHG | OXYGEN SATURATION: 96 % | BODY MASS INDEX: 27.16 KG/M2

## 2018-10-23 DIAGNOSIS — Z00.00 ENCOUNTER FOR MEDICARE ANNUAL WELLNESS EXAM: Primary | ICD-10-CM

## 2018-10-23 DIAGNOSIS — Z12.5 SCREENING FOR PROSTATE CANCER: ICD-10-CM

## 2018-10-23 PROCEDURE — 1170F FXNL STATUS ASSESSED: CPT | Performed by: INTERNAL MEDICINE

## 2018-10-23 PROCEDURE — 1125F AMNT PAIN NOTED PAIN PRSNT: CPT | Performed by: INTERNAL MEDICINE

## 2018-10-23 PROCEDURE — G0439 PPPS, SUBSEQ VISIT: HCPCS | Performed by: INTERNAL MEDICINE

## 2018-10-23 RX ORDER — MOXIFLOXACIN 5 MG/ML
SOLUTION/ DROPS OPHTHALMIC
Refills: 2 | COMMUNITY
Start: 2018-09-12 | End: 2018-12-06 | Stop reason: ALTCHOICE

## 2018-10-23 RX ORDER — NEPAFENAC 0.3 %
SUSPENSION, DROPS(FINAL DOSAGE FORM)(ML) OPHTHALMIC (EYE)
Refills: 2 | COMMUNITY
Start: 2018-09-11 | End: 2018-12-06 | Stop reason: ALTCHOICE

## 2018-10-23 RX ORDER — TRIPROLIDINE/PSEUDOEPHEDRINE 2.5MG-60MG
TABLET ORAL
Refills: 2 | COMMUNITY
Start: 2018-09-11 | End: 2018-12-06 | Stop reason: ALTCHOICE

## 2018-10-23 NOTE — PROGRESS NOTES
Assessment and Plan:    Problem List Items Addressed This Visit        Other    Encounter for Medicare annual wellness exam - Primary     Discussed preventative health, cancer screening, immunizations, and safety issues  Other Visit Diagnoses     Screening for prostate cancer        Relevant Orders    PSA, Total Screen        Health Maintenance Due   Topic Date Due    Depression Screening PHQ  1950    Fall Risk  11/13/2013    INFLUENZA VACCINE  07/01/2018         HPI:  Pool Cruz is a 69 y o  male here for his Subsequent Wellness Visit      Patient Active Problem List   Diagnosis    Roberts's esophagus    Acquired hypothyroidism    Chronic GERD    Headache    Hypertension    Mixed hyperlipidemia    Cough    Encounter for Medicare annual wellness exam     Past Medical History:   Diagnosis Date    Roberts's esophagus      Past Surgical History:   Procedure Laterality Date    CATARACT EXTRACTION       Family History   Problem Relation Age of Onset    No Known Problems Mother     No Known Problems Father      History   Smoking Status    Never Smoker   Smokeless Tobacco    Never Used     History   Alcohol use Not on file      History   Drug use: Unknown       Current Outpatient Prescriptions   Medication Sig Dispense Refill    aspirin 81 MG tablet Take by mouth      atorvastatin (LIPITOR) 10 mg tablet Take 1 tablet (10 mg total) by mouth daily 90 tablet 1    DUREZOL 0 05 % EMUL INSTILL 1 DROP IN THE RIGHT EYE TWO TIMES DAILY FOR 1 WEEK THEN INSTILL 1 DROP IN THE RIGHT EYE EVERY DAY FOR 3 WEEKS  2    fluticasone (FLONASE) 50 mcg/act nasal spray 1 spray into each nostril daily 16 g 5    fluticasone (FLOVENT HFA) 220 mcg/act inhaler Inhale 1 puff 2 (two) times a day      ILEVRO 0 3 % SUSP INSTILL 1 DROP IN THE RIGHT EYE EVERY DAY START 3 DAYS BEFORE SURGERY  2    latanoprost (XALATAN) 0 005 % ophthalmic solution Apply to eye      levothyroxine 50 mcg tablet Take 1 tablet by mouth  losartan (COZAAR) 50 mg tablet Take 1 tablet by mouth daily      moxifloxacin (VIGAMOX) 0 5 % ophthalmic solution INSTILL 1 DROP IN THE RIGHT EYE THREE TIMES DAILY STARTING 3 DAYS BEFORE SURGERY AND CONTINUING FOR 4 DAYS AFTER  2    rizatriptan (MAXALT) 10 MG tablet Take 1 tablet by mouth      timolol (TIMOPTIC) 0 5 % ophthalmic solution        No current facility-administered medications for this visit  No Known Allergies  Immunization History   Administered Date(s) Administered    Hep A, adult 2017    Influenza Split High Dose Preservative Free IM 2014, 2015, 2017    Pneumococcal Conjugate 13-Valent 2017    Pneumococcal Polysaccharide PPV23 2014    Tdap 02/15/2018    Zoster 2014       Patient Care Team:  Markell Spencer MD as PCP - General  Octavia Madison MD    Medicare Screening Tests and Risk Assessments:  María Del Rio is here for his Subsequent Wellness visit  Health Risk Assessment:  Patient rates overall health as good  Patient feels that their physical health rating is Slightly worse  Eyesight was rated as Much worse  Hearing was rated as Same  Patient feels that their emotional and mental health rating is Same  Pain experienced by patient in the last 7 days has been Some  Patient's pain rating has been 2/10  Patient states that he has experienced weight loss or gain in last 6 months  Emotional/Mental Health:  Patient has been feeling nervous/anxious  PHQ-9 Depression Screening:    Frequency of the following problems over the past two weeks:      1  Little interest or pleasure in doing things: 2 - more than half the days      2  Feeling down, depressed, or hopeless: 0 - not at all      3  Trouble falling or staying asleep, or sleeping too much: 0 - not at all      4  Feeling tired or having little energy: 2 - more than half the days      5  Poor appetite or overeatin - not at all      6   Feeling bad about yourself - or that you are a failure or have let yourself or your family down: 0 - not at all      7  Trouble concentrating on things, such as reading the newspaper or watching television: 0 - not at all      8  Moving or speaking so slowly that other people could have noticed  Or the opposite - being so fidgety or restless that you have been moving around a lot more than usual: 0 - not at all      9  Thoughts that you would be better off dead, or of hurting yourself in some way: 0 - not at all  PHQ-2 Score: 2  PHQ-9 Score: 4    Broken Bones/Falls: Fall Risk Assessment:    In the past year, patient has experienced: No history of falling in past year          Bladder/Bowel:  Patient has not leaked urine accidently in the last six months  Patient reports no loss of bowel control  Immunizations:  Patient has had a flu vaccination within the last year  Patient has not received a pneumonia shot  Patient has not received a shingles shot  Patient has received tetanus/diphtheria shot  Date of tetanus/diphtheria shot: 2/15/2018    Home Safety:  Patient has trouble with stairs inside or outside of their home  Patient currently reports that there are no safety hazards present in home, working smoke alarms, no working carbon monoxide detectors  Preventative Screenings:   prostate cancer screen performed, 9/13/2017  colon cancer screen completed, 8/19/2015  cholesterol screen completed, 9/25/2018  glaucoma eye exam completed, 10/23/2018      Nutrition:  Current diet: Regular with servings of the following:    Medications:  Patient is not currently taking any over-the-counter supplements  Patient is able to manage medications  Lifestyle Choices:  Patient reports no tobacco use  Patient has smoked or used tobacco in the past   Patient has stopped his tobacco use  Tobacco use quit date: 1980  Patient reports alcohol use  Alcohol use per week: 2-3  Patient drives a vehicle  Patient wears seat belt      Current level of exercise of physical activity described by patient as: [facility] 2x  Activities of Daily Living:  Can get out of bed by his or her self, able to dress self, able to make own meals, able to do own shopping, able to bathe self, can do own laundry/housekeeping, can manage own money, pay bills and track expenses    Previous Hospitalizations:  Hospitalization or ED visit in past 12 months  Number of hospitalizations within the last year: 1-2        Advanced Directives:  Patient has decided on a power of   Patient has spoken to designated power of   Patient has completed advanced directive  Preventative Screening/Counseling:      Cardiovascular:      General: Risks and Benefits Discussed and Screening Current          Diabetes:      General: Risks and Benefits Discussed and Screening Current          Colorectal Cancer:      General: Risks and Benefits Discussed and Screening Current          Prostate Cancer:      General: Risks and Benefits Discussed and Screening Current          Hepatitis C:      General: Risks and Benefits Discussed and Screening Current        Immunizations:      Influenza: Risks & Benefits Discussed, Influenza Recommended Annually and Patient Declines      Pneumococcal: Risks & Benefits Discussed and Lifetime Vaccine Completed      Shingrix: Risks & Benefits Discussed      TDAP: Risks & Benefits Discussed and Tdap Vaccine UTD      Other Preventative Counseling (Non-Medicare):  Car/seat belt/driving safety reviewed, Skin self-exam and Sunscreen use      Physical Exam   Constitutional: He is oriented to person, place, and time  He appears well-developed and well-nourished  No distress  HENT:   Head: Normocephalic and atraumatic  Right Ear: External ear normal    Left Ear: External ear normal    Nose: Nose normal    Eyes: Conjunctivae are normal  No scleral icterus  Neck: Normal range of motion  Neck supple  No tracheal deviation present     Cardiovascular: Normal rate, regular rhythm and normal heart sounds  Pulmonary/Chest: Effort normal and breath sounds normal  No respiratory distress  He has no wheezes  He has no rales  Abdominal: Soft  Bowel sounds are normal  He exhibits no mass  There is no tenderness  There is no guarding  Musculoskeletal: He exhibits no edema  Lymphadenopathy:     He has no cervical adenopathy  Neurological: He is alert and oriented to person, place, and time  Psychiatric: He has a normal mood and affect  His behavior is normal  Judgment and thought content normal    Vitals reviewed

## 2018-10-23 NOTE — PATIENT INSTRUCTIONS
Problem List Items Addressed This Visit        Other    Encounter for Medicare annual wellness exam - Primary     Discussed preventative health, cancer screening, immunizations, and safety issues             Other Visit Diagnoses     Screening for prostate cancer        Relevant Orders    PSA, Total Screen

## 2018-10-23 NOTE — TELEPHONE ENCOUNTER
He does not need the pneumonia shot  He had both Pneumovax 23 and the Prevnar 13 so he is good for life on those    He can get Shingrix at the pharmacy, and does not need a prescription for that, he can just ask for it by name, Shingrix

## 2018-10-25 RX ORDER — BUPIVACAINE HYDROCHLORIDE 2.5 MG/ML
2 INJECTION, SOLUTION INFILTRATION; PERINEURAL
Status: COMPLETED | OUTPATIENT
Start: 2018-10-16 | End: 2018-10-16

## 2018-10-25 RX ORDER — BETAMETHASONE SODIUM PHOSPHATE AND BETAMETHASONE ACETATE 3; 3 MG/ML; MG/ML
6 INJECTION, SUSPENSION INTRA-ARTICULAR; INTRALESIONAL; INTRAMUSCULAR; SOFT TISSUE
Status: COMPLETED | OUTPATIENT
Start: 2018-10-16 | End: 2018-10-16

## 2018-10-25 RX ORDER — LIDOCAINE HYDROCHLORIDE 10 MG/ML
1 INJECTION, SOLUTION INFILTRATION; PERINEURAL
Status: COMPLETED | OUTPATIENT
Start: 2018-10-16 | End: 2018-10-16

## 2018-12-06 ENCOUNTER — OFFICE VISIT (OUTPATIENT)
Dept: INTERNAL MEDICINE CLINIC | Facility: CLINIC | Age: 68
End: 2018-12-06
Payer: COMMERCIAL

## 2018-12-06 VITALS
RESPIRATION RATE: 16 BRPM | DIASTOLIC BLOOD PRESSURE: 72 MMHG | TEMPERATURE: 98.6 F | BODY MASS INDEX: 27.16 KG/M2 | SYSTOLIC BLOOD PRESSURE: 134 MMHG | HEIGHT: 62 IN | OXYGEN SATURATION: 97 % | HEART RATE: 71 BPM | WEIGHT: 147.6 LBS

## 2018-12-06 DIAGNOSIS — J01.10 ACUTE FRONTAL SINUSITIS, RECURRENCE NOT SPECIFIED: Primary | ICD-10-CM

## 2018-12-06 PROCEDURE — 4040F PNEUMOC VAC/ADMIN/RCVD: CPT | Performed by: NURSE PRACTITIONER

## 2018-12-06 PROCEDURE — 99213 OFFICE O/P EST LOW 20 MIN: CPT | Performed by: NURSE PRACTITIONER

## 2018-12-06 RX ORDER — BRINZOLAMIDE 1 %
SUSPENSION, DROPS(FINAL DOSAGE FORM)(ML) OPHTHALMIC (EYE)
Refills: 2 | COMMUNITY
Start: 2018-11-16 | End: 2019-01-24 | Stop reason: ALTCHOICE

## 2018-12-06 RX ORDER — PREDNISOLONE ACETATE 10 MG/ML
SUSPENSION/ DROPS OPHTHALMIC
Refills: 2 | COMMUNITY
Start: 2018-11-15 | End: 2019-01-24 | Stop reason: ALTCHOICE

## 2018-12-06 RX ORDER — ACETAZOLAMIDE 250 MG/1
250 TABLET ORAL 2 TIMES DAILY
Refills: 2 | COMMUNITY
Start: 2018-11-16 | End: 2018-12-06 | Stop reason: ALTCHOICE

## 2018-12-06 RX ORDER — AMOXICILLIN AND CLAVULANATE POTASSIUM 875; 125 MG/1; MG/1
1 TABLET, FILM COATED ORAL EVERY 12 HOURS SCHEDULED
Qty: 20 TABLET | Refills: 0 | Status: SHIPPED | OUTPATIENT
Start: 2018-12-06 | End: 2018-12-16

## 2018-12-06 NOTE — PROGRESS NOTES
Assessment/Plan:     Diagnoses and all orders for this visit:    Acute frontal sinusitis, recurrence not specified  Comments:  if symptoms worsen over the next 2-3 days, start antibiotic  use flonase 1 spray in each nostril daily   Orders:  -     amoxicillin-clavulanate (AUGMENTIN) 875-125 mg per tablet; Take 1 tablet by mouth every 12 (twelve) hours for 10 days    Other orders  -     Discontinue: acetaZOLAMIDE (DIAMOX) 250 mg tablet; Take 250 mg by mouth 2 (two) times a day  -     AZOPT 1 % ophthalmic suspension; INSTILL 1 DROP INTO AFFECTED EYE THREE TIMES DAILY  -     prednisoLONE acetate (PRED FORTE) 1 % ophthalmic suspension; INSTILL 1 DROP INTO AFFECTED EYE THREE TIMES DAILY          Subjective:      Patient ID: Leo Castelan is a 77 y o  male  Here for a cough and sinus pain   Started 3-4 days ago   +cough, fatigue, sinus pain/pressure, scratchy throat, and headache  Denies any fevers, myalgias  Taking nyquil, delsym        The following portions of the patient's history were reviewed and updated as appropriate: allergies, current medications, past family history, past medical history, past social history, past surgical history and problem list     Review of Systems   Constitutional: Positive for fatigue  Negative for fever  HENT: Positive for congestion, sinus pain, sinus pressure and sore throat  Respiratory: Positive for cough  Cardiovascular: Negative  Gastrointestinal: Positive for diarrhea  Negative for nausea and vomiting  Musculoskeletal: Negative for myalgias  Skin: Negative  Neurological: Positive for headaches  Objective:      /72   Pulse 71   Temp 98 6 °F (37 °C) (Tympanic)   Resp 16   Ht 5' 2" (1 575 m)   Wt 67 kg (147 lb 9 6 oz)   SpO2 97%   BMI 27 00 kg/m²          Physical Exam   Constitutional: He is oriented to person, place, and time  He appears well-developed and well-nourished     HENT:   Right Ear: External ear normal    Left Ear: External ear normal    Nose: Mucosal edema present  Right sinus exhibits frontal sinus tenderness  Left sinus exhibits frontal sinus tenderness  Mouth/Throat: Posterior oropharyngeal erythema present  Eyes: Pupils are equal, round, and reactive to light  Cardiovascular: Normal rate, regular rhythm and normal heart sounds  Pulmonary/Chest: Effort normal and breath sounds normal    Lymphadenopathy:     He has no cervical adenopathy  Neurological: He is alert and oriented to person, place, and time  Psychiatric: He has a normal mood and affect  His behavior is normal    Vitals reviewed

## 2019-01-13 DIAGNOSIS — E03.9 ACQUIRED HYPOTHYROIDISM: Primary | ICD-10-CM

## 2019-01-14 RX ORDER — LEVOTHYROXINE SODIUM 0.05 MG/1
TABLET ORAL
Qty: 90 TABLET | Refills: 3 | Status: SHIPPED | OUTPATIENT
Start: 2019-01-14 | End: 2020-02-21 | Stop reason: SDUPTHER

## 2019-01-24 ENCOUNTER — OFFICE VISIT (OUTPATIENT)
Dept: CARDIOLOGY CLINIC | Facility: CLINIC | Age: 69
End: 2019-01-24
Payer: COMMERCIAL

## 2019-01-24 VITALS
HEART RATE: 65 BPM | WEIGHT: 144.8 LBS | RESPIRATION RATE: 18 BRPM | BODY MASS INDEX: 26.65 KG/M2 | SYSTOLIC BLOOD PRESSURE: 130 MMHG | DIASTOLIC BLOOD PRESSURE: 80 MMHG | HEIGHT: 62 IN

## 2019-01-24 DIAGNOSIS — I10 ESSENTIAL HYPERTENSION: Primary | ICD-10-CM

## 2019-01-24 DIAGNOSIS — E78.2 MIXED HYPERLIPIDEMIA: ICD-10-CM

## 2019-01-24 PROCEDURE — 99214 OFFICE O/P EST MOD 30 MIN: CPT | Performed by: INTERNAL MEDICINE

## 2019-01-24 PROCEDURE — 93000 ELECTROCARDIOGRAM COMPLETE: CPT | Performed by: INTERNAL MEDICINE

## 2019-01-24 NOTE — LETTER
January 24, 2019     Ana Page MD  [facility] [address]    Patient: Balwinder Nair   YOB: 1950   Date of Visit: 1/24/2019       Dear Dr Tita Ramos: Thank you for referring Balwinder Nair to me for evaluation  Below are my notes for this consultation  If you have questions, please do not hesitate to call me  I look forward to following your patient along with you  Sincerely,        Shawn Najera MD        CC: No Recipients  Shawn Najera MD  1/24/2019  3:46 PM  Sign at close encounter  Assessment/Plan:    Hypertension  Hypertension, stable  We will continue the same medications  Mixed hyperlipidemia  Hyperlipidemia, stable  Patient will continue same medications  Diagnoses and all orders for this visit:    Essential hypertension  -     POCT ECG    Mixed hyperlipidemia          Subjective:  Feels fairly well  Patient ID: Balwinder Nair is a 77 y o  male  The patient presented to this office for the purpose of cardiac re-evaluation in anticipation of eye surgery  He has been feeling otherwise well denying any symptoms of chest pain, shortness of breath, palpitation, dizziness or lightheadedness  He has no leg edema  The following portions of the patient's history were reviewed and updated as appropriate: allergies, current medications, past family history, past medical history, past social history, past surgical history and problem list     Review of Systems   Respiratory: Negative for apnea, cough, chest tightness, shortness of breath and wheezing  Cardiovascular: Negative for chest pain, palpitations and leg swelling  Gastrointestinal: Negative for abdominal pain  Neurological: Negative for dizziness and light-headedness  Objective:  Stable cardiac-wise        /80 (BP Location: Right arm, Patient Position: Sitting)   Pulse 65   Resp 18   Ht 5' 2" (1 575 m)   Wt 65 7 kg (144 lb 12 8 oz)   BMI 26 48 kg/m²           Physical Exam   Constitutional: He is oriented to person, place, and time  He appears well-developed and well-nourished  No distress  HENT:   Head: Normocephalic  Eyes: Pupils are equal, round, and reactive to light  Neck: Normal range of motion  No JVD present  No thyromegaly present  Cardiovascular: Normal rate, regular rhythm, S1 normal and S2 normal   Exam reveals no gallop and no friction rub  Murmur heard  Crescendo systolic murmur is present with a grade of 1/6   Pulmonary/Chest: Effort normal and breath sounds normal  No respiratory distress  He has no wheezes  He has no rales  He exhibits no tenderness  Abdominal: Soft  Musculoskeletal: Normal range of motion  He exhibits no edema, tenderness or deformity  Neurological: He is alert and oriented to person, place, and time  Skin: Skin is warm and dry  He is not diaphoretic  Psychiatric: He has a normal mood and affect  Vitals reviewed

## 2019-01-24 NOTE — PATIENT INSTRUCTIONS
I have advised the patient to proceed with planned surgery  The patient is stable from the cardiac standpoint  He will continue the same medications

## 2019-03-23 ENCOUNTER — APPOINTMENT (EMERGENCY)
Dept: RADIOLOGY | Facility: HOSPITAL | Age: 69
DRG: 390 | End: 2019-03-23
Payer: COMMERCIAL

## 2019-03-23 ENCOUNTER — HOSPITAL ENCOUNTER (INPATIENT)
Facility: HOSPITAL | Age: 69
LOS: 1 days | Discharge: HOME | DRG: 390 | End: 2019-03-24
Attending: EMERGENCY MEDICINE | Admitting: COLON & RECTAL SURGERY
Payer: COMMERCIAL

## 2019-03-23 DIAGNOSIS — K56.609 SBO (SMALL BOWEL OBSTRUCTION) (HCC): Primary | ICD-10-CM

## 2019-03-23 LAB
ALBUMIN SERPL BCP-MCNC: 4.3 G/DL (ref 3.5–5)
ALP SERPL-CCNC: 66 U/L (ref 46–116)
ALT SERPL W P-5'-P-CCNC: 18 U/L (ref 12–78)
ANION GAP SERPL CALCULATED.3IONS-SCNC: 3 MMOL/L (ref 4–13)
AST SERPL W P-5'-P-CCNC: 20 U/L (ref 5–45)
ATRIAL RATE: 64 BPM
BASOPHILS # BLD AUTO: 0.03 THOUSANDS/ΜL (ref 0–0.1)
BASOPHILS NFR BLD AUTO: 0 % (ref 0–1)
BILIRUB SERPL-MCNC: 0.58 MG/DL (ref 0.2–1)
BUN SERPL-MCNC: 15 MG/DL (ref 5–25)
CALCIUM SERPL-MCNC: 9.8 MG/DL (ref 8.3–10.1)
CHLORIDE SERPL-SCNC: 102 MMOL/L (ref 100–108)
CO2 SERPL-SCNC: 33 MMOL/L (ref 21–32)
CREAT SERPL-MCNC: 1.07 MG/DL (ref 0.6–1.3)
EOSINOPHIL # BLD AUTO: 0.14 THOUSAND/ΜL (ref 0–0.61)
EOSINOPHIL NFR BLD AUTO: 2 % (ref 0–6)
ERYTHROCYTE [DISTWIDTH] IN BLOOD BY AUTOMATED COUNT: 12.9 % (ref 11.6–15.1)
GFR SERPL CREATININE-BSD FRML MDRD: 70 ML/MIN/1.73SQ M
GLUCOSE SERPL-MCNC: 109 MG/DL (ref 65–140)
HCT VFR BLD AUTO: 50.1 % (ref 36.5–49.3)
HGB BLD-MCNC: 16.9 G/DL (ref 12–17)
IMM GRANULOCYTES # BLD AUTO: 0.03 THOUSAND/UL (ref 0–0.2)
IMM GRANULOCYTES NFR BLD AUTO: 0 % (ref 0–2)
LIPASE SERPL-CCNC: 138 U/L (ref 73–393)
LYMPHOCYTES # BLD AUTO: 1.59 THOUSANDS/ΜL (ref 0.6–4.47)
LYMPHOCYTES NFR BLD AUTO: 17 % (ref 14–44)
MCH RBC QN AUTO: 29.6 PG (ref 26.8–34.3)
MCHC RBC AUTO-ENTMCNC: 33.7 G/DL (ref 31.4–37.4)
MCV RBC AUTO: 88 FL (ref 82–98)
MONOCYTES # BLD AUTO: 0.57 THOUSAND/ΜL (ref 0.17–1.22)
MONOCYTES NFR BLD AUTO: 6 % (ref 4–12)
NEUTROPHILS # BLD AUTO: 7.23 THOUSANDS/ΜL (ref 1.85–7.62)
NEUTS SEG NFR BLD AUTO: 75 % (ref 43–75)
NRBC BLD AUTO-RTO: 0 /100 WBCS
P AXIS: 73 DEGREES
PLATELET # BLD AUTO: 230 THOUSANDS/UL (ref 149–390)
PMV BLD AUTO: 10.2 FL (ref 8.9–12.7)
POTASSIUM SERPL-SCNC: 3.8 MMOL/L (ref 3.5–5.3)
PR INTERVAL: 140 MS
PROT SERPL-MCNC: 7.7 G/DL (ref 6.4–8.2)
QRS AXIS: 59 DEGREES
QRSD INTERVAL: 84 MS
QT INTERVAL: 420 MS
QTC INTERVAL: 433 MS
RBC # BLD AUTO: 5.7 MILLION/UL (ref 3.88–5.62)
SODIUM SERPL-SCNC: 138 MMOL/L (ref 136–145)
T WAVE AXIS: 50 DEGREES
TROPONIN I SERPL-MCNC: <0.02 NG/ML
VENTRICULAR RATE: 64 BPM
WBC # BLD AUTO: 9.59 THOUSAND/UL (ref 4.31–10.16)

## 2019-03-23 PROCEDURE — 84484 ASSAY OF TROPONIN QUANT: CPT | Performed by: EMERGENCY MEDICINE

## 2019-03-23 PROCEDURE — 74177 CT ABD & PELVIS W/CONTRAST: CPT

## 2019-03-23 PROCEDURE — 93010 ELECTROCARDIOGRAM REPORT: CPT | Performed by: INTERNAL MEDICINE

## 2019-03-23 PROCEDURE — 96374 THER/PROPH/DIAG INJ IV PUSH: CPT

## 2019-03-23 PROCEDURE — 71046 X-RAY EXAM CHEST 2 VIEWS: CPT

## 2019-03-23 PROCEDURE — 36415 COLL VENOUS BLD VENIPUNCTURE: CPT | Performed by: EMERGENCY MEDICINE

## 2019-03-23 PROCEDURE — 80053 COMPREHEN METABOLIC PANEL: CPT | Performed by: EMERGENCY MEDICINE

## 2019-03-23 PROCEDURE — 83690 ASSAY OF LIPASE: CPT | Performed by: EMERGENCY MEDICINE

## 2019-03-23 PROCEDURE — 96375 TX/PRO/DX INJ NEW DRUG ADDON: CPT

## 2019-03-23 PROCEDURE — 93005 ELECTROCARDIOGRAM TRACING: CPT

## 2019-03-23 PROCEDURE — 85025 COMPLETE CBC W/AUTO DIFF WBC: CPT | Performed by: EMERGENCY MEDICINE

## 2019-03-23 PROCEDURE — 96361 HYDRATE IV INFUSION ADD-ON: CPT

## 2019-03-23 PROCEDURE — 99285 EMERGENCY DEPT VISIT HI MDM: CPT

## 2019-03-23 RX ORDER — ONDANSETRON 2 MG/ML
4 INJECTION INTRAMUSCULAR; INTRAVENOUS ONCE
Status: COMPLETED | OUTPATIENT
Start: 2019-03-23 | End: 2019-03-23

## 2019-03-23 RX ORDER — LATANOPROST 50 UG/ML
1 SOLUTION/ DROPS OPHTHALMIC
Status: DISCONTINUED | OUTPATIENT
Start: 2019-03-23 | End: 2019-03-23

## 2019-03-23 RX ORDER — SODIUM CHLORIDE 9 MG/ML
125 INJECTION, SOLUTION INTRAVENOUS CONTINUOUS
Status: DISCONTINUED | OUTPATIENT
Start: 2019-03-23 | End: 2019-03-24 | Stop reason: HOSPADM

## 2019-03-23 RX ORDER — TIMOLOL MALEATE 5 MG/ML
1 SOLUTION/ DROPS OPHTHALMIC 2 TIMES DAILY
Status: DISCONTINUED | OUTPATIENT
Start: 2019-03-23 | End: 2019-03-23

## 2019-03-23 RX ORDER — HYDROMORPHONE HCL/PF 1 MG/ML
1 SYRINGE (ML) INJECTION ONCE
Status: COMPLETED | OUTPATIENT
Start: 2019-03-23 | End: 2019-03-23

## 2019-03-23 RX ORDER — TIMOLOL MALEATE 5 MG/ML
1 SOLUTION/ DROPS OPHTHALMIC 2 TIMES DAILY
Status: DISCONTINUED | OUTPATIENT
Start: 2019-03-24 | End: 2019-03-24 | Stop reason: HOSPADM

## 2019-03-23 RX ORDER — ONDANSETRON 2 MG/ML
4 INJECTION INTRAMUSCULAR; INTRAVENOUS EVERY 6 HOURS PRN
Status: DISCONTINUED | OUTPATIENT
Start: 2019-03-23 | End: 2019-03-24 | Stop reason: HOSPADM

## 2019-03-23 RX ORDER — MAGNESIUM HYDROXIDE/ALUMINUM HYDROXICE/SIMETHICONE 120; 1200; 1200 MG/30ML; MG/30ML; MG/30ML
30 SUSPENSION ORAL ONCE
Status: DISCONTINUED | OUTPATIENT
Start: 2019-03-23 | End: 2019-03-23

## 2019-03-23 RX ORDER — PANTOPRAZOLE SODIUM 40 MG/1
40 INJECTION, POWDER, FOR SOLUTION INTRAVENOUS
Status: DISCONTINUED | OUTPATIENT
Start: 2019-03-24 | End: 2019-03-24 | Stop reason: HOSPADM

## 2019-03-23 RX ORDER — HEPARIN SODIUM 5000 [USP'U]/ML
5000 INJECTION, SOLUTION INTRAVENOUS; SUBCUTANEOUS EVERY 8 HOURS SCHEDULED
Status: DISCONTINUED | OUTPATIENT
Start: 2019-03-23 | End: 2019-03-24 | Stop reason: HOSPADM

## 2019-03-23 RX ORDER — LATANOPROST 50 UG/ML
1 SOLUTION/ DROPS OPHTHALMIC
Status: DISCONTINUED | OUTPATIENT
Start: 2019-03-23 | End: 2019-03-24 | Stop reason: HOSPADM

## 2019-03-23 RX ORDER — HYDROMORPHONE HCL/PF 1 MG/ML
0.2 SYRINGE (ML) INJECTION
Status: DISCONTINUED | OUTPATIENT
Start: 2019-03-23 | End: 2019-03-24 | Stop reason: HOSPADM

## 2019-03-23 RX ADMIN — SODIUM CHLORIDE 1000 ML: 0.9 INJECTION, SOLUTION INTRAVENOUS at 18:48

## 2019-03-23 RX ADMIN — IOHEXOL 100 ML: 350 INJECTION, SOLUTION INTRAVENOUS at 19:41

## 2019-03-23 RX ADMIN — SODIUM CHLORIDE 125 ML/HR: 0.9 INJECTION, SOLUTION INTRAVENOUS at 21:22

## 2019-03-23 RX ADMIN — Medication 1 SPRAY: at 21:24

## 2019-03-23 RX ADMIN — ONDANSETRON 4 MG: 2 INJECTION INTRAMUSCULAR; INTRAVENOUS at 19:08

## 2019-03-23 RX ADMIN — HYDROMORPHONE HYDROCHLORIDE 1 MG: 1 INJECTION, SOLUTION INTRAMUSCULAR; INTRAVENOUS; SUBCUTANEOUS at 19:08

## 2019-03-23 RX ADMIN — HEPARIN SODIUM 5000 UNITS: 5000 INJECTION INTRAVENOUS; SUBCUTANEOUS at 23:25

## 2019-03-23 RX ADMIN — LATANOPROST 1 DROP: 50 SOLUTION OPHTHALMIC at 23:25

## 2019-03-23 RX ADMIN — Medication 1 SPRAY: at 23:26

## 2019-03-23 NOTE — ED ATTENDING ATTESTATION
Felicita Garcia MD, saw and evaluated the patient  I have discussed the patient with the resident/non-physician practitioner and agree with the resident's/non-physician practitioner's findings, Plan of Care, and MDM as documented in the resident's/non-physician practitioner's note, except where noted  All available labs and Radiology studies were reviewed  I was present for key portions of any procedure(s) performed by the resident/non-physician practitioner and I was immediately available to provide assistance  At this point I agree with the current assessment done in the Emergency Department    I have conducted an independent evaluation of this patient a history and physical is as follows:  Upper abd pain   Cramping   Into the sternal area   Intermittent comes in waves   Has Barretts epsophagus and GERD  This does not feel like his GERD     no abdominal surgeries  PMH  HTN  Elevated cholesterol   Exam  Mild distress  Anicteric neck normal carotid upstrokes  Lungs clear heart rrr no m abd soft  Pos  bs  Epigastric tenderness and upper abdominal tenderness no obvious hernia noted bowel sounds noted   patient has vomitus in an emesis basin there is no blood in this it is not coffee-ground   EKG shows normal sinus rhythm with no acute ischemic changes   CT scan rule out bowel obstruction   labs to rule out  Pancreatitis   pain control IV fluids and antiemetics      Critical Care Time  Procedures

## 2019-03-23 NOTE — ED PROVIDER NOTES
History  Chief Complaint   Patient presents with    Epigastric Pain     c/o left epigastric/abdominal pain starting at 1400 hours today  Patient is a 78-year-old male with history of GERD, Roberts's esophagitis who presents for epigastric pain  Patient says that it started around 2:00 p m  This afternoon  He describes it as a cramping sensation and it does not radiate anywhere else  He says he was sitting on the couch when he developed the pain  He says that it waxes and wanes in intensity  He says that it is not similar to GERD he has had in the past   It is associated with nausea but no vomiting  He denies associated chest pain, shortness of breath, lightheadedness, dizziness, diaphoresis  He says that he has had 4 bowel movements today that are normal consistency without blood  He denies urinary symptoms  He admits to a history of hypertension, hyperlipidemia but denies a history of diabetes or cardiac disease          Prior to Admission Medications   Prescriptions Last Dose Informant Patient Reported?  Taking?   aspirin 81 MG tablet   Yes No   Sig: Take by mouth   atorvastatin (LIPITOR) 10 mg tablet  Self No No   Sig: Take 1 tablet (10 mg total) by mouth daily   Patient taking differently: Take 10 mg by mouth every other day     latanoprost (XALATAN) 0 005 % ophthalmic solution   Yes No   Sig: Administer 1 drop to both eyes    levothyroxine 50 mcg tablet   No No   Sig: TAKE 1 TABLET BY MOUTH EVERY DAY ON AN EMPTY STOMACH   losartan (COZAAR) 50 mg tablet   Yes No   Sig: Take 1 tablet by mouth daily   rizatriptan (MAXALT) 10 MG tablet   Yes No   Sig: Take 1 tablet by mouth   timolol (TIMOPTIC) 0 5 % ophthalmic solution   Yes No   Sig: Administer 1 drop to both eyes 2 (two) times a day       Facility-Administered Medications: None       Past Medical History:   Diagnosis Date    Angina effort ([facility] [address] )     Anxiety     Roberts's esophagus     Chest pain, unspecified     GERD (gastroesophageal reflux disease)     Hyperlipidemia     Hypertension     Migraine        Past Surgical History:   Procedure Laterality Date    CATARACT EXTRACTION      EYE SURGERY      HERNIA REPAIR Right 2014       Family History   Problem Relation Age of Onset    No Known Problems Mother     Heart attack Father      I have reviewed and agree with the history as documented  Social History     Tobacco Use    Smoking status: Former Smoker     Packs/day: 0 25     Years: 4 00     Pack years: 1 00     Types: Cigarettes     Last attempt to quit:      Years since quittin 2    Smokeless tobacco: Never Used   Substance Use Topics    Alcohol use: Yes     Frequency: 2-3 times a week     Drinks per session: 1 or 2     Binge frequency: Never    Drug use: Never        Review of Systems   Constitutional: Negative for chills, fever and unexpected weight change  HENT: Negative for congestion, sore throat and trouble swallowing  Eyes: Negative for pain, discharge and itching  Respiratory: Negative for cough, chest tightness, shortness of breath and wheezing  Cardiovascular: Negative for chest pain, palpitations and leg swelling  Gastrointestinal: Positive for abdominal pain (Epigastric), nausea and vomiting  Negative for blood in stool and diarrhea  Endocrine: Negative for polyuria  Genitourinary: Negative for difficulty urinating, dysuria, frequency and hematuria  Musculoskeletal: Negative for arthralgias and back pain  Skin: Negative for color change and rash  Neurological: Negative for dizziness, syncope, weakness, light-headedness and headaches         Physical Exam  ED Triage Vitals [19 1833]   Temperature Pulse Respirations Blood Pressure SpO2   98 5 °F (36 9 °C) 69 20 130/80 97 %      Temp Source Heart Rate Source Patient Position - Orthostatic VS BP Location FiO2 (%)   Oral Monitor Sitting Right arm --      Pain Score       6             Orthostatic Vital Signs  Vitals:    19 2030 03/23/19 2100 03/23/19 2300   BP: (!) 178/85 (!) 174/79 166/84 151/74   Pulse: 83 89 83 81   Patient Position - Orthostatic VS:   Lying Lying       Physical Exam   Constitutional: He is oriented to person, place, and time  He appears well-developed and well-nourished  No distress  HENT:   Head: Normocephalic and atraumatic  Right Ear: External ear normal    Left Ear: External ear normal    Eyes: Pupils are equal, round, and reactive to light  Conjunctivae and EOM are normal  Right eye exhibits no discharge  Left eye exhibits no discharge  Neck: Normal range of motion  Cardiovascular: Normal rate, regular rhythm, normal heart sounds and intact distal pulses  Exam reveals no gallop and no friction rub  No murmur heard  Pulmonary/Chest: Effort normal and breath sounds normal  No respiratory distress  He has no wheezes  He has no rales  Abdominal: Soft  Bowel sounds are normal  He exhibits no distension  There is tenderness (Epigastric)  There is no guarding  Musculoskeletal: Normal range of motion  He exhibits no edema, tenderness or deformity  Lymphadenopathy:     He has no cervical adenopathy  Neurological: He is alert and oriented to person, place, and time  No cranial nerve deficit or sensory deficit  He exhibits normal muscle tone  Skin: Skin is warm and dry  Psychiatric: He has a normal mood and affect  His behavior is normal    Nursing note and vitals reviewed        ED Medications  Medications   phenol (CHLORASEPTIC) 1 4 % mucosal liquid 1 spray (1 spray Mouth/Throat Given 3/23/19 2326)   sodium chloride 0 9 % infusion (125 mL/hr Intravenous New Bag 3/23/19 2122)   ondansetron (ZOFRAN) injection 4 mg (0 mg Intravenous Return to [facility] 3/23/19 2137)   heparin (porcine) subcutaneous injection 5,000 Units (5,000 Units Subcutaneous Given 3/23/19 2325)   HYDROmorphone (DILAUDID) injection 0 2 mg (has no administration in time range)   latanoprost (XALATAN) 0 005 % ophthalmic solution 1 drop (1 drop Both Eyes Given 3/23/19 2325)   timolol (TIMOPTIC) 0 5 % ophthalmic solution 1 drop (has no administration in time range)   pantoprazole (PROTONIX) injection 40 mg (has no administration in time range)   sodium chloride 0 9 % bolus 1,000 mL (0 mL Intravenous Stopped 3/23/19 1940)   ondansetron (ZOFRAN) injection 4 mg (4 mg Intravenous Given 3/23/19 1908)   HYDROmorphone (DILAUDID) injection 1 mg (1 mg Intravenous Given 3/23/19 1908)   iohexol (OMNIPAQUE) 350 MG/ML injection (MULTI-DOSE) 100 mL (100 mL Intravenous Given 3/23/19 1941)       Diagnostic Studies  Results Reviewed     Procedure Component Value Units Date/Time    Lipase [[id number]]  (Normal) Collected:  03/23/19 1849    Lab Status:  Final result Specimen:  Blood from Arm, Left Updated:  03/23/19 1929     Lipase 138 u/L     Troponin I [[id number]]  (Normal) Collected:  03/23/19 1849    Lab Status:  Final result Specimen:  Blood from Arm, Left Updated:  03/23/19 1929     Troponin I <0 02 ng/mL     Comprehensive metabolic panel [[id number]]  (Abnormal) Collected:  03/23/19 1849    Lab Status:  Final result Specimen:  Blood from Arm, Left Updated:  03/23/19 1929     Sodium 138 mmol/L      Potassium 3 8 mmol/L      Chloride 102 mmol/L      CO2 33 mmol/L      ANION GAP 3 mmol/L      BUN 15 mg/dL      Creatinine 1 07 mg/dL      Glucose 109 mg/dL      Calcium 9 8 mg/dL      AST 20 U/L      ALT 18 U/L      Alkaline Phosphatase 66 U/L      Total Protein 7 7 g/dL      Albumin 4 3 g/dL      Total Bilirubin 0 58 mg/dL      eGFR 70 ml/min/1 73sq m     Narrative:       National Kidney Disease Education Program recommendations are as follows:  GFR calculation is accurate only with a steady state creatinine  Chronic Kidney disease less than 60 ml/min/1 73 sq  meters  Kidney failure less than 15 ml/min/1 73 sq  meters      CBC and differential [[id number]]  (Abnormal) Collected:  03/23/19 1849    Lab Status:  Final result Specimen:  Blood from Arm, Left Updated: 03/23/19 1918     WBC 9 59 Thousand/uL      RBC 5 70 Million/uL      Hemoglobin 16 9 g/dL      Hematocrit 50 1 %      MCV 88 fL      MCH 29 6 pg      MCHC 33 7 g/dL      RDW 12 9 %      MPV 10 2 fL      Platelets 616 Thousands/uL      nRBC 0 /100 WBCs      Neutrophils Relative 75 %      Immat GRANS % 0 %      Lymphocytes Relative 17 %      Monocytes Relative 6 %      Eosinophils Relative 2 %      Basophils Relative 0 %      Neutrophils Absolute 7 23 Thousands/µL      Immature Grans Absolute 0 03 Thousand/uL      Lymphocytes Absolute 1 59 Thousands/µL      Monocytes Absolute 0 57 Thousand/µL      Eosinophils Absolute 0 14 Thousand/µL      Basophils Absolute 0 03 Thousands/µL                  CT abdomen pelvis with contrast   Final Result by Katherin Apple MD (03/23 2000)      There is evidence of small bowel obstruction, with stomach, proximal and mid small bowel loops dilated, and transition point seen around series 2 images 55 through 63 in the midline lower abdomen  Exact etiology is unclear  No obstructing mass is    noted  Workstation performed: [id number]         XR chest 2 views    (Results Pending)         Procedures  Procedures      Phone Consults  ED Phone Contact    ED Course                               MDM  Number of Diagnoses or Management Options  SBO (small bowel obstruction) [facility]):   Diagnosis management comments: 7year-old male with history of GERD, Roberts's esophagus who presents for epigastric pain  Associated with nausea and vomiting  Denies fevers or chills, chest pain, shortness of breath, lightheadedness or dizziness  Will obtain CBC, CMP, lipase  Will obtain troponin, EKG and chest x-ray  Will get CT abdomen pelvis with contrast   Will give GI cocktail, Dilaudid and Zofran for symptom control  CT shows small bowel obstruction  NG tube placed  Patient admitted to Colorectal surgery         Disposition  Final diagnoses:   SBO (small bowel obstruction) ([facility] [address] )     Time reflects when diagnosis was documented in both MDM as applicable and the Disposition within this note     Time User Action Codes Description Comment    3/23/2019  8:00 PM Misael Golden Add [K56 609] SBO (small bowel obstruction) [facility])       ED Disposition     ED Disposition Condition Date/Time Comment    Admit Stable Sat Mar 23, 2019  8:00 PM Case was discussed with  Colorectal Surgery and the patient's admission status was agreed to be Admission Status: inpatient status to the service of Dr Emilie Camara    None         Current Discharge Medication List      CONTINUE these medications which have NOT CHANGED    Details   aspirin 81 MG tablet Take by mouth      atorvastatin (LIPITOR) 10 mg tablet Take 1 tablet (10 mg total) by mouth daily  Qty: 90 tablet, Refills: 1    Associated Diagnoses: Mixed hyperlipidemia      latanoprost (XALATAN) 0 005 % ophthalmic solution Administer 1 drop to both eyes       levothyroxine 50 mcg tablet TAKE 1 TABLET BY MOUTH EVERY DAY ON AN EMPTY STOMACH  Qty: 90 tablet, Refills: 3    Associated Diagnoses: Acquired hypothyroidism      losartan (COZAAR) 50 mg tablet Take 1 tablet by mouth daily      rizatriptan (MAXALT) 10 MG tablet Take 1 tablet by mouth      timolol (TIMOPTIC) 0 5 % ophthalmic solution Administer 1 drop to both eyes 2 (two) times a day            No discharge procedures on file  ED Provider  Attending physically available and evaluated [facility]  I managed the patient along with the ED Attending      Electronically Signed by         Aaliyah Onofre DO  03/24/19 0003

## 2019-03-24 ENCOUNTER — APPOINTMENT (INPATIENT)
Dept: RADIOLOGY | Facility: HOSPITAL | Age: 69
DRG: 390 | End: 2019-03-24
Payer: COMMERCIAL

## 2019-03-24 VITALS
OXYGEN SATURATION: 94 % | DIASTOLIC BLOOD PRESSURE: 82 MMHG | SYSTOLIC BLOOD PRESSURE: 166 MMHG | WEIGHT: 144 LBS | BODY MASS INDEX: 26.5 KG/M2 | HEIGHT: 62 IN | TEMPERATURE: 99.2 F | RESPIRATION RATE: 18 BRPM | HEART RATE: 82 BPM

## 2019-03-24 LAB
ANION GAP SERPL CALCULATED.3IONS-SCNC: 5 MMOL/L (ref 4–13)
BASOPHILS # BLD AUTO: 0.02 THOUSANDS/ΜL (ref 0–0.1)
BASOPHILS NFR BLD AUTO: 0 % (ref 0–1)
BUN SERPL-MCNC: 12 MG/DL (ref 5–25)
CALCIUM SERPL-MCNC: 9 MG/DL (ref 8.3–10.1)
CHLORIDE SERPL-SCNC: 107 MMOL/L (ref 100–108)
CO2 SERPL-SCNC: 27 MMOL/L (ref 21–32)
CREAT SERPL-MCNC: 0.91 MG/DL (ref 0.6–1.3)
EOSINOPHIL # BLD AUTO: 0 THOUSAND/ΜL (ref 0–0.61)
EOSINOPHIL NFR BLD AUTO: 0 % (ref 0–6)
ERYTHROCYTE [DISTWIDTH] IN BLOOD BY AUTOMATED COUNT: 13 % (ref 11.6–15.1)
GFR SERPL CREATININE-BSD FRML MDRD: 85 ML/MIN/1.73SQ M
GLUCOSE SERPL-MCNC: 111 MG/DL (ref 65–140)
HCT VFR BLD AUTO: 45.4 % (ref 36.5–49.3)
HGB BLD-MCNC: 15.1 G/DL (ref 12–17)
IMM GRANULOCYTES # BLD AUTO: 0.03 THOUSAND/UL (ref 0–0.2)
IMM GRANULOCYTES NFR BLD AUTO: 0 % (ref 0–2)
LYMPHOCYTES # BLD AUTO: 1.5 THOUSANDS/ΜL (ref 0.6–4.47)
LYMPHOCYTES NFR BLD AUTO: 16 % (ref 14–44)
MAGNESIUM SERPL-MCNC: 2.1 MG/DL (ref 1.6–2.6)
MCH RBC QN AUTO: 29.6 PG (ref 26.8–34.3)
MCHC RBC AUTO-ENTMCNC: 33.3 G/DL (ref 31.4–37.4)
MCV RBC AUTO: 89 FL (ref 82–98)
MONOCYTES # BLD AUTO: 0.73 THOUSAND/ΜL (ref 0.17–1.22)
MONOCYTES NFR BLD AUTO: 8 % (ref 4–12)
NEUTROPHILS # BLD AUTO: 7.29 THOUSANDS/ΜL (ref 1.85–7.62)
NEUTS SEG NFR BLD AUTO: 76 % (ref 43–75)
NRBC BLD AUTO-RTO: 0 /100 WBCS
PLATELET # BLD AUTO: 211 THOUSANDS/UL (ref 149–390)
PMV BLD AUTO: 10.5 FL (ref 8.9–12.7)
POTASSIUM SERPL-SCNC: 3.7 MMOL/L (ref 3.5–5.3)
RBC # BLD AUTO: 5.1 MILLION/UL (ref 3.88–5.62)
SODIUM SERPL-SCNC: 139 MMOL/L (ref 136–145)
WBC # BLD AUTO: 9.57 THOUSAND/UL (ref 4.31–10.16)

## 2019-03-24 PROCEDURE — 74177 CT ABD & PELVIS W/CONTRAST: CPT

## 2019-03-24 PROCEDURE — G8987 SELF CARE CURRENT STATUS: HCPCS

## 2019-03-24 PROCEDURE — G8989 SELF CARE D/C STATUS: HCPCS

## 2019-03-24 PROCEDURE — G8988 SELF CARE GOAL STATUS: HCPCS

## 2019-03-24 PROCEDURE — 85025 COMPLETE CBC W/AUTO DIFF WBC: CPT | Performed by: SURGERY

## 2019-03-24 PROCEDURE — C9113 INJ PANTOPRAZOLE SODIUM, VIA: HCPCS | Performed by: SURGERY

## 2019-03-24 PROCEDURE — 83735 ASSAY OF MAGNESIUM: CPT | Performed by: SURGERY

## 2019-03-24 PROCEDURE — 97166 OT EVAL MOD COMPLEX 45 MIN: CPT

## 2019-03-24 PROCEDURE — 80048 BASIC METABOLIC PNL TOTAL CA: CPT | Performed by: SURGERY

## 2019-03-24 RX ADMIN — SODIUM CHLORIDE 125 ML/HR: 0.9 INJECTION, SOLUTION INTRAVENOUS at 05:20

## 2019-03-24 RX ADMIN — TIMOLOL MALEATE 1 DROP: 5 SOLUTION/ DROPS OPHTHALMIC at 08:43

## 2019-03-24 RX ADMIN — ONDANSETRON 4 MG: 2 INJECTION INTRAMUSCULAR; INTRAVENOUS at 05:22

## 2019-03-24 RX ADMIN — HEPARIN SODIUM 5000 UNITS: 5000 INJECTION INTRAVENOUS; SUBCUTANEOUS at 05:22

## 2019-03-24 RX ADMIN — PANTOPRAZOLE SODIUM 40 MG: 40 INJECTION, POWDER, FOR SOLUTION INTRAVENOUS at 08:50

## 2019-03-24 RX ADMIN — IOHEXOL 100 ML: 350 INJECTION, SOLUTION INTRAVENOUS at 14:07

## 2019-03-24 NOTE — PROGRESS NOTES
Progress Note - Colorectal Surgery   Chan Yao 77 y o  male MRN: [id number]  Unit/Bed#: [facility] 826-01 Encounter: [id number]    Assessment:  75-year-old male with no prior abdominal surgeries presents with small-bowel obstruction  Etiology unclear at this time    Plan:  NPO for bowel rest  NG tube decompression  Fluid hydration  Encourage ambulation  Serial abdominal exams  Monitor and replete electrolytes  Consider CT enterography  Will discuss timing with attending  Continue home medications  DVT prophylaxis    Subjective/Objective   Chief Complaint:     Subjective:  No acute events overnight  Mild nausea  Passing flatus and abdominal pain is resolved    Objective:     Blood pressure 151/74, pulse 81, temperature 98 1 °F (36 7 °C), temperature source Oral, resp  rate 18, height 5' 2" (1 575 m), weight 65 3 kg (144 lb), SpO2 96 %  ,Body mass index is 26 34 kg/m²  Intake/Output Summary (Last 24 hours) at 3/24/2019 0526  Last data filed at 3/24/2019 0519  Gross per 24 hour   Intake 1993 75 ml   Output 450 ml   Net 1543 75 ml       Invasive Devices     Peripheral Intravenous Line            Peripheral IV 03/23/19 Left Antecubital less than 1 day          Drain            NG/OG/Enteral Tube Nasogastric 14 Fr Left nares less than 1 day                Physical Exam:   No acute distress  Regular rate and rhythm  Nonlabored respirations on room air  Abdomen soft, nondistended, nontender   No hernia, no scars    Lab, Imaging and other studies:  CBC:   Lab Results   Component Value Date    WBC 9 59 03/23/2019    HGB 16 9 03/23/2019    HCT 50 1 (H) 03/23/2019    MCV 88 03/23/2019     03/23/2019    MCH 29 6 03/23/2019    MCHC 33 7 03/23/2019    RDW 12 9 03/23/2019    MPV 10 2 03/23/2019    NRBC 0 03/23/2019   , CMP:   Lab Results   Component Value Date    SODIUM 138 03/23/2019    K 3 8 03/23/2019     03/23/2019    CO2 33 (H) 03/23/2019    BUN 15 03/23/2019    CREATININE 1 07 03/23/2019    CALCIUM 9 8 03/23/2019    AST 20 03/23/2019    ALT 18 03/23/2019    ALKPHOS 66 03/23/2019    EGFR 70 03/23/2019   , Coagulation: No results found for: PT, INR, APTT, Urinalysis: No results found for: Ferd Hurry, SPECGRAV, PHUR, LEUKOCYTESUR, NITRITE, PROTEINUA, GLUCOSEU, KETONESU, BILIRUBINUR, BLOODU, Amylase: No results found for: AMYLASE, Lipase:   Lab Results   Component Value Date    LIPASE 138 03/23/2019     VTE Pharmacologic Prophylaxis: Heparin  VTE Mechanical Prophylaxis: sequential compression device

## 2019-03-24 NOTE — PLAN OF CARE
Problem: GASTROINTESTINAL - ADULT  Goal: Minimal or absence of nausea and/or vomiting  Description  INTERVENTIONS:  - Administer IV fluids as ordered to ensure adequate hydration  - Maintain NPO status until nausea and vomiting are resolved  - Nasogastric tube as ordered  - Administer ordered antiemetic medications as needed  - Provide nonpharmacologic comfort measures as appropriate  - Advance diet as tolerated, if ordered  - Nutrition services referral to assist patient with adequate nutrition and appropriate food choices  Outcome: Progressing  Goal: Maintains or returns to baseline bowel function  Description  INTERVENTIONS:  - Assess bowel function  - Encourage oral fluids to ensure adequate hydration  - Administer IV fluids as ordered to ensure adequate hydration  - Administer ordered medications as needed  - Encourage mobilization and activity  - Nutrition services referral to assist patient with appropriate food choices  Outcome: Progressing

## 2019-03-24 NOTE — UTILIZATION REVIEW
Initial Clinical Review    Admission: Date/Time/Statement: 3/23/19 @ 2001   Orders Placed This Encounter   Procedures    Inpatient Admission     Standing Status:   Standing     Number of Occurrences:   1     Order Specific Question:   Admitting Physician     Answer:   Anuj Dumont [[id number]]     Order Specific Question:   Level of Care     Answer:   Med Surg [[id number]]     Order Specific Question:   Estimated length of stay     Answer:   More than 2 Midnights     Order Specific Question:   Certification     Answer:   I certify that inpatient services are medically necessary for this patient for a duration of greater than two midnights  See H&P and MD Progress Notes for additional information about the patient's course of treatment  ED: Date/Time/Mode of Arrival:   ED Arrival Information     Expected Arrival Acuity Means of Arrival Escorted By Service Admission Type    - 3/23/2019 18:11 Emergent [facility]    Arrival Complaint    abdominal pain        Chief Complaint:   Chief Complaint   Patient presents with    Epigastric Pain     c/o left epigastric/abdominal pain starting at 1400 hours today  Assessment/Plan: yo y/o male presented to ED with c/o pain in his epigastric area since ~2pm today  Pain is located in the epigastric area, crampy in nature and intermittent  Also complains of nausea and nonbilious, nonbloody emesis  States he continues to pass flatus and had a bowel movement this afternoon  En ED CT abd (+) SBO  Admit to M/S unit, NGT for decompression, IVF, pain and nasuea med management          ED Vital Signs:   ED Triage Vitals [03/23/19 1833]   Temperature Pulse Respirations Blood Pressure SpO2   98 5 °F (36 9 °C) 69 20 130/80 97 %      Temp Source Heart Rate Source Patient Position - Orthostatic VS BP Location FiO2 (%)   Oral Monitor Sitting Right arm --      Pain Score       6        Wt Readings from Last 1 Encounters:   03/23/19 65 3 kg (144 lb)     Vital Signs (abnormal): //80  Pertinent Labs/Diagnostic Test Results: CO 2 33, Anion gap 3, RBC 5 70, HCT 50 1  CT abd//pelvis -- There is evidence of small bowel obstruction, with stomach, proximal and mid small bowel loops dilated, and transition point seen around series 2 images 55 through 63 in the midline lower abdomen  Exact etiology is unclear  EKG -- NSR      ED Treatment:   Medication Administration from 03/23/2019 1811 to 03/23/2019 2050       Date/Time Order Dose Route Action     03/23/2019 1848 sodium chloride 0 9 % bolus 1,000 mL 1,000 mL Intravenous New Bag     03/23/2019 1908 ondansetron (ZOFRAN) injection 4 mg 4 mg Intravenous Given     03/23/2019 1908 HYDROmorphone (DILAUDID) injection 1 mg 1 mg Intravenous Given     03/23/2019 1941 iohexol (OMNIPAQUE) 350 MG/ML injection (MULTI-DOSE) 100 mL 100 mL Intravenous Given     Past Medical/Surgical History:    Active Ambulatory Problems     Diagnosis Date Noted    Roberts's esophagus 02/13/2018    Acquired hypothyroidism 11/17/2016    Chronic GERD 08/28/2017    Headache 06/23/2017    Hypertension 10/02/2015    Mixed hyperlipidemia 10/02/2015    Cough 02/13/2018    Encounter for Medicare annual wellness exam 10/23/2018     Past Medical History:   Diagnosis Date    Angina effort ([address] )     Anxiety     Roberts's esophagus     Chest pain, unspecified     GERD (gastroesophageal reflux disease)     Hyperlipidemia     Hypertension     Migraine      Admitting Diagnosis: SBO (small bowel obstruction) ([facility]) [K56 609]  Abdominal pain [R10 9]  Age/Sex: 77 y o  male  Admission Orders:  Scheduled Meds:   Current Facility-Administered Medications:  heparin (porcine) 5,000 Units Subcutaneous Q8H [address]   HYDROmorphone 0 2 mg Intravenous Q3H PRN    latanoprost 1 drop Both Eyes HS   ondansetron 4 mg Intravenous Q6H PRN 3/24 x1   pantoprazole 40 mg Intravenous Q24H [address]     phenol 1 spray Mouth/Throat Q2H PRN 3/23 x2   sodium chloride 125 mL/hr Intravenous Continuous   timolol 1 drop Both Eyes BID     M/S unit  I/O  NGT  Up as stacy  IS q 1h while awake        Network Utilization Review Department  Phone: [phone]; Fax [phone]  [email or web address]  ATTENTION: Please call with any questions or concerns to [phone]  and carefully listen to the prompts so that you are directed to the right person  Send all requests for admission clinical reviews, approved or denied determinations and any other requests to fax [phone]   All voicemails are confidential

## 2019-03-24 NOTE — PROGRESS NOTES
Pt returned to room from CT scan  D/harjinder NG tube as ordered  Pt discharged  Reviewed d/c instructions    D/harjinder saline lock with cath intact

## 2019-03-24 NOTE — DISCHARGE SUMMARY
Discharge Summary - Judith Orosco 77 y o  male MRN: [id number]    [facility] Encounter: [id number]    Admission Date:   Admission Orders (From admission, onward)    Ordered        03/23/19 2001  Inpatient Admission  Once     Order ID Start Status   [id number] 03/23/19 2001 Completed                Admitting Diagnosis: SBO (small [facility] obstruction) ([facility] ) [K56 609]  Abdominal pain [R10 9]    HPI: Judith Orosco is a 77 y o  male who presents with abdominal pain  The patient stated that the pain started around 2:00 p m  today  The pain is located in the epigastric area, crampy in nature and intermittent  Also complains of nausea and nonbilious, nonbloody emesis  States he continues to pass flatus and had a bowel movement this afternoon  Denies any fevers or chills  No prior episodes of similar type pain  He does have a history of GERD and complains of some heartburn during this episode of abdominal pain  He denies any prior abdominal surgeries, only a right groin hernia repair  He gets routine colonoscopies by Dr Shaylee Calixto which have been normal   He denies a personal or family history of cancer  Denies any blood per rectum or change in stool caliber  On presentation to the emergency department his laboratory workup was unremarkable  Imaging with CT scan of the abdomen revealed a small bowel obstruction so surgical consultation was obtained  He has received some pain medication and his pain is relieved at the time of evaluation  Nausea is also improved  Procedures Performed: No orders of the defined types were placed in this encounter  Summary of Hospital Course: Patient was admitted and an NGT was placed  He received IV fluid hydration and underwent serial abdominal exams  Bowel function returned on hospital day 2 and CT enterography was performed   This was negative for obstruction and the patient was discharged home per his request      Significant Findings, Care, Treatment and Services Provided: As above  Complications: none    Discharge Diagnosis: SBO (small bowel obstruction) ([facility] ) [K56 609]  Abdominal pain [R10 9]    Resolved Problems  Date Reviewed: 1/24/2019    None          Condition at Discharge: good         Discharge instructions/Information to patient and family:   See after visit summary for information provided to patient and family  Provisions for Follow-Up Care:  See after visit summary for information related to follow-up care and any pertinent home health orders  PCP: José Manuel Sevilla MD    Disposition: See After Visit Summary for discharge disposition information  Planned Readmission: No    Discharge Statement   I spent 30 minutes discharging the patient  This time was spent on the day of discharge  I had direct contact with the patient on the day of discharge  Additional documentation is required if more than 30 minutes were spent on discharge  Discharge Medications:  See after visit summary for reconciled discharge medications provided to patient and family

## 2019-03-24 NOTE — H&P
H&P - Colorectal Surgery   Rex Rojas 77 y o  male MRN: [id number]  Unit/Bed#: ED 06 Encounter: [id number]    Assessment and Plan:  70-year-old male with small-bowel obstruction  Unclear etiology  No prior abdominal surgeries  Admit to colorectal surgery  NPO  NG tube decompression  IV fluid hydration  Serial abdominal exams  Will check CT enterography within the next few days after bowel decompression to further delineate etiology for small-bowel obstruction  Surgical intervention if pain worsens or becomes unstable  Encourage ambulation  Resume home eye drops      History of Present Illness   HPI:  Rex Rojas is a 77 y o  male who presents with abdominal pain  The patient stated that the pain started around 2:00 p m  today  The pain is located in the epigastric area, crampy in nature and intermittent  Also complains of nausea and nonbilious, nonbloody emesis  States he continues to pass flatus and had a bowel movement this afternoon  Denies any fevers or chills  No prior episodes of similar type pain  He does have a history of GERD and complains of some heartburn during this episode of abdominal pain  He denies any prior abdominal surgeries, only a right groin hernia repair  He gets routine colonoscopies by Dr Ana Mckeon which have been normal   He denies a personal or family history of cancer  Denies any blood per rectum or change in stool caliber  On presentation to the emergency department his laboratory workup was unremarkable  Imaging with CT scan of the abdomen revealed a small bowel obstruction so surgical consultation was obtained  He has received some pain medication and his pain is relieved at the time of evaluation  Nausea is also improved  Consults    Review of Systems   Constitutional: Positive for appetite change  Negative for chills  HENT: Negative for congestion, sore throat and trouble swallowing  Eyes: Negative for discharge, redness and itching     Respiratory: Negative for apnea, cough, shortness of breath and wheezing  Cardiovascular: Negative for chest pain, palpitations and leg swelling  Gastrointestinal: Positive for abdominal pain, nausea and vomiting  Negative for abdominal distention, anal bleeding, blood in stool, constipation and diarrhea  Endocrine: Negative for cold intolerance and heat intolerance  Genitourinary: Negative for difficulty urinating, dysuria and frequency  Musculoskeletal: Negative for arthralgias, back pain and joint swelling  Skin: Negative for color change, pallor and rash  Allergic/Immunologic: Negative for environmental allergies and food allergies  Neurological: Negative for dizziness, seizures, facial asymmetry, numbness and headaches  Hematological: Negative for adenopathy  Does not bruise/bleed easily  Psychiatric/Behavioral: Negative for agitation, behavioral problems and hallucinations  Historical Information   Past Medical History:   Diagnosis Date    Angina effort ([address] )     Anxiety     Roberts's esophagus     Chest pain, unspecified     GERD (gastroesophageal reflux disease)     Hyperlipidemia     Hypertension     Migraine      Past Surgical History:   Procedure Laterality Date    CATARACT EXTRACTION      EYE SURGERY      HERNIA REPAIR Right      Social History   Social History     Substance and Sexual Activity   Alcohol Use Yes     Social History     Substance and Sexual Activity   Drug Use Yes    Types: MDMA (ecstacy)     Social History     Tobacco Use   Smoking Status Former Smoker    Packs/day: 0 25    Years: 4 00    Pack years: 1 00    Types: Cigarettes    Last attempt to quit: Geronimo Simmons Years since quittin 2   Smokeless Tobacco Never Used     Family History   Problem Relation Age of Onset    No Known Problems Mother     Heart attack Father        Meds/Allergies   PTA meds:   Prior to Admission Medications   Prescriptions Last Dose Informant Patient Reported? Taking? aspirin 81 MG tablet   Yes No   Sig: Take by mouth   atorvastatin (LIPITOR) 10 mg tablet  Self No No   Sig: Take 1 tablet (10 mg total) by mouth daily   Patient taking differently: Take 10 mg by mouth every other day     latanoprost (XALATAN) 0 005 % ophthalmic solution   Yes No   Sig: Apply to eye   levothyroxine 50 mcg tablet   No No   Sig: TAKE 1 TABLET BY MOUTH EVERY DAY ON AN EMPTY STOMACH   losartan (COZAAR) 50 mg tablet   Yes No   Sig: Take 1 tablet by mouth daily   rizatriptan (MAXALT) 10 MG tablet   Yes No   Sig: Take 1 tablet by mouth   timolol (TIMOPTIC) 0 5 % ophthalmic solution   Yes No      Facility-Administered Medications: None     Allergies   Allergen Reactions    Nitrous Oxide        Objective   First Vitals:   Blood Pressure: 130/80 (03/23/19 1833)  Pulse: 69 (03/23/19 1833)  Temperature: 98 5 °F (36 9 °C) (03/23/19 1833)  Temp Source: Oral (03/23/19 1833)  Respirations: 20 (03/23/19 1833)  Height: 5' 2" (157 5 cm) (03/23/19 1833)  Weight - Scale: 65 3 kg (144 lb) (03/23/19 1833)  SpO2: 97 % (03/23/19 1833)    Current Vitals:   Blood Pressure: (!) 178/85 (03/23/19 2008)  Pulse: 83 (03/23/19 2008)  Temperature: 98 5 °F (36 9 °C) (03/23/19 1833)  Temp Source: Oral (03/23/19 1833)  Respirations: 20 (03/23/19 2008)  Height: 5' 2" (157 5 cm) (03/23/19 1833)  Weight - Scale: 65 3 kg (144 lb) (03/23/19 1833)  SpO2: 97 % (03/23/19 2008)      Intake/Output Summary (Last 24 hours) at 3/23/2019 2031  Last data filed at 3/23/2019 1940  Gross per 24 hour   Intake 1000 ml   Output --   Net 1000 ml       Invasive Devices     Peripheral Intravenous Line            Peripheral IV 03/23/19 Left Antecubital less than 1 day                Physical Exam   Constitutional: He is oriented to person, place, and time  He appears well-developed and well-nourished  HENT:   Head: Normocephalic and atraumatic  Eyes: Pupils are equal, round, and reactive to light   Conjunctivae and EOM are normal  Right eye exhibits no discharge  Neck: Normal range of motion  Neck supple  No tracheal deviation present  No thyromegaly present  Cardiovascular: Normal rate, regular rhythm and normal heart sounds  Pulmonary/Chest: Effort normal and breath sounds normal  No respiratory distress  Abdominal: Soft  Bowel sounds are normal  There is tenderness  There is no rebound and no guarding  No hernia  Mild tenderness in epigastrium   Musculoskeletal: Normal range of motion  He exhibits no edema or deformity  Neurological: He is alert and oriented to person, place, and time  No cranial nerve deficit  Skin: Skin is warm and dry  Vitals reviewed  Lab Results:   CBC:   Lab Results   Component Value Date    WBC 9 59 03/23/2019    HGB 16 9 03/23/2019    HCT 50 1 (H) 03/23/2019    MCV 88 03/23/2019     03/23/2019    MCH 29 6 03/23/2019    MCHC 33 7 03/23/2019    RDW 12 9 03/23/2019    MPV 10 2 03/23/2019    NRBC 0 03/23/2019   , CMP:   Lab Results   Component Value Date    SODIUM 138 03/23/2019    K 3 8 03/23/2019     03/23/2019    CO2 33 (H) 03/23/2019    BUN 15 03/23/2019    CREATININE 1 07 03/23/2019    CALCIUM 9 8 03/23/2019    AST 20 03/23/2019    ALT 18 03/23/2019    ALKPHOS 66 03/23/2019    EGFR 70 03/23/2019   , Coagulation: No results found for: PT, INR, APTT, Urinalysis: No results found for: Naaman Gowers, SPECGRAV, PHUR, LEUKOCYTESUR, NITRITE, PROTEINUA, GLUCOSEU, KETONESU, BILIRUBINUR, BLOODU, Amylase: No results found for: AMYLASE, Lipase:   Lab Results   Component Value Date    LIPASE 138 03/23/2019     Imaging: I have personally reviewed pertinent films in PACS  EKG, Pathology, and Other Studies: I have personally reviewed pertinent films in PACS    Counseling / Coordination of Care  Total floor / unit time spent today 25 minutes  Greater than 50% of total time was spent with the patient and / or family counseling and / or coordination of care      Gordo Harmon MD  3/23/2019 8:31 PM

## 2019-03-24 NOTE — OCCUPATIONAL THERAPY NOTE
[address] Evaluation     Patient Name: Jorge Pitts Date: 3/24/2019  Problem List  Patient Active Problem List   Diagnosis    Roberts's esophagus    Acquired hypothyroidism    Chronic GERD    Headache    Hypertension    Mixed hyperlipidemia    Cough    Encounter for Medicare annual wellness exam    SBO (small bowel obstruction) ([facility] )     Past Medical History  Past Medical History:   Diagnosis Date    Angina effort ([facility] )     Anxiety     Roberts's esophagus     Chest pain, unspecified     GERD (gastroesophageal reflux disease)     Hyperlipidemia     Hypertension     Migraine      Past Surgical History  Past Surgical History:   Procedure Laterality Date    CATARACT EXTRACTION      EYE SURGERY      HERNIA REPAIR Right 2014 03/24/19 2913   Note Type   Note type Eval only   Restrictions/Precautions   Weight Bearing Precautions Per Order No   Other Precautions Fall Risk;Pain   Pain Assessment   Pain Assessment No/denies pain   Pain Score No Pain   Home Living   Type of [address] [place] [address] Multi-level;1/2 bath on main level   Bathroom Shower/Tub Tub/shower unit   Bathroom Toilet Standard   Prior Function   Level of [place] Independent with ADLs and functional mobility   Lives With Spouse   Receives Help From Family   ADL Assistance Independent   IADLs Independent   Falls in the last 6 months 0   Vocational Full time employment   Lifestyle   Autonomy pta pt reports I in ADLs/IADLs/functional mobility   Reciprocal Relationships supportive spouse prseent   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   Intrinsic Gratification enjoys flying   Psychosocial   Psychosocial (WDL) WDL   Subjective   Subjective "I'm doing good on my own"   ADL   Where Assessed Chair   Eating Assistance 5  Supervision/Setup   Grooming Assistance 5  Supervision/Setup   [address] 5  Supervision/Setup   LB Bathing Assistance [address] [address] Supervision/Setup   LB Dressing Assistance 5  Supervision/Setup   Toileting Assistance  5  Supervision/Setup   Transfers   Sit to Stand 5  Supervision   Additional items Increased time required   Stand to Sit 5  Supervision   Additional items Increased time required   Toilet transfer 5  Supervision   Additional items Increased time required   Functional Mobility   Functional Mobility 5  Supervision   Additional items   (IV POLE)   Balance   Static Sitting Good   Dynamic Sitting Fair +   Static Standing Fair +   Dynamic Standing Fair   Ambulatory Fair   Activity Tolerance   Activity Tolerance Patient tolerated treatment well   Nurse Made Aware rN AWARE   RUE Assessment   RUE Assessment WFL   LUE Assessment   LUE Assessment WFL   Hand Function   Gross Motor Coordination Functional   Fine Motor Coordination Functional   Cognition   Overall Cognitive Status WFL   Arousal/Participation Cooperative   Attention Within functional limits   Orientation Level Oriented X4   Memory Within functional limits   Following Commands Follows all commands and directions without difficulty   Assessment   Limitation Decreased ADL status; Decreased Safe judgement during ADL;Decreased endurance;Decreased self-care trans;Decreased high-level ADLs   Prognosis Good   Assessment Pt is a 76 YO  Male admitted [facility] w/ SBO  Comorbidities include a h/o GERD, HLD, HTN, migraine, and cataracts   Pt with active OT orders and up as tolerated orders   Pt resides in a multi level home with spouse  Pt was I w/  ADLS and IADLS, (+) drove, & required no use of DME PTA  Currently pt is supervision for ADLs/IADLs/functional mobility  Pt is limited at this time 2*: pain, endurance, activity tolerance and decreased safety awareness  The following Occupational Performance Areas to address include: functional mobility, clothing management, household maintenance and job performance/volunteering  Pt scored overall  60/100 on the Barthel Index   Based on the aforementioned OT evaluation, functional performance deficits, and assessments, pt has been identified as a moderate complexity evaluation  From OT standpoint, anticipate d/c home with family support  Recommend continued participation in ADLs and functional mobility w/ staff  No further acute OT needs, d/c OT      Goals   Patient Goals go home   Recommendation   OT Discharge Recommendation Home with family support   OT - OK to Discharge Yes   Barthel Index   Feeding 5  (NPO)   Bathing 0   Grooming Score 5   Dressing Score 10   Bladder Score 10   Bowels Score 10   Toilet Use Score 10   Transfers (Bed/Chair) Score 10   Mobility (Level Surface) Score 0   Stairs Score 0   Barthel Index Score 60   Modified [place] Scale   Modified Shayna Scale 3     Kacy Ding MS, OTR/L

## 2019-03-25 ENCOUNTER — TRANSITIONAL CARE MANAGEMENT (OUTPATIENT)
Dept: INTERNAL MEDICINE CLINIC | Facility: CLINIC | Age: 69
End: 2019-03-25

## 2019-03-25 NOTE — UTILIZATION REVIEW
Notification of Inpatient Admission/Inpatient Authorization Request  This is a Notification of Inpatient Admission/Request for Inpatient Authorization for our facility [place] [address]  Be advised that this patient was admitted to our facility under Inpatient Status  Please contact the Utilization Review Department where the patient is receiving care services for additional admission information  Place of Service Code: 24   Place of Service Name: Inpatient Hospital  Presentation Date & Time: 3/23/2019  6:34 PM  Inpatient Admission Date & Time: 3/23/19 2001  Discharge Date & Time: 3/24/2019  6:11 PM   Discharge Disposition (if discharged): Home/Self Care  Attending Physician: Arnulfo Verde Md [[id number]]  Admission Orders (From admission, onward)    Ordered        03/23/19 2001  Inpatient Admission  Once     Order ID Start Status   [id number] 03/23/19 2001 Completed              Facility: [address])  [facility] Utilization Review Department  Phone: [phone]; Fax [phone]  [email or web address]  ATTENTION: Please call with any questions or concerns to [phone]  and carefully listen to the prompts so that you are directed to the right person  Send all requests for admission clinical reviews, approved or denied determinations and any other requests to fax [phone]   All voicemails are confidential

## 2019-03-28 ENCOUNTER — OFFICE VISIT (OUTPATIENT)
Dept: INTERNAL MEDICINE CLINIC | Facility: CLINIC | Age: 69
End: 2019-03-28
Payer: COMMERCIAL

## 2019-03-28 VITALS
HEIGHT: 62 IN | BODY MASS INDEX: 27.6 KG/M2 | WEIGHT: 150 LBS | SYSTOLIC BLOOD PRESSURE: 154 MMHG | DIASTOLIC BLOOD PRESSURE: 100 MMHG | TEMPERATURE: 98.2 F | OXYGEN SATURATION: 98 % | HEART RATE: 57 BPM

## 2019-03-28 DIAGNOSIS — E78.2 MIXED HYPERLIPIDEMIA: ICD-10-CM

## 2019-03-28 DIAGNOSIS — I10 ESSENTIAL HYPERTENSION: Primary | ICD-10-CM

## 2019-03-28 DIAGNOSIS — E03.9 ACQUIRED HYPOTHYROIDISM: ICD-10-CM

## 2019-03-28 DIAGNOSIS — K56.609 SBO (SMALL BOWEL OBSTRUCTION) (HCC): ICD-10-CM

## 2019-03-28 RX ORDER — LOSARTAN POTASSIUM 100 MG/1
100 TABLET ORAL DAILY
Qty: 90 TABLET | Refills: 3 | Status: SHIPPED | OUTPATIENT
Start: 2019-03-28 | End: 2020-04-21 | Stop reason: SDUPTHER

## 2019-03-28 NOTE — PROGRESS NOTES
Assessment/Plan:    Acquired hypothyroidism  Last thyroid function tests were normal, continue levothyroxine at 50 mcg daily  Mixed hyperlipidemia  Continue statin and healthy diet and exercise  Essential hypertension  I recommend increasing losartan from 50 mg daily to 100 mg daily, and continuing to monitor blood pressure    SBO (small bowel obstruction) ([facility])  Improved, continue with healthy diet including lots of fruit which he does       Diagnoses and all orders for this visit:    Essential hypertension  -     losartan (COZAAR) 100 MG tablet; Take 1 tablet (100 mg total) by mouth daily    Acquired hypothyroidism    Mixed hyperlipidemia    SBO (small bowel obstruction) ([facility] )    Other orders  -     Cancel: influenza vaccine, [id number], high-dose, PF 0 5 mL, for patients 65 yr+ (FLUZONE HIGH-DOSE)          Subjective:      Patient ID: Raheel Cary is a 77 y o  male  I reviewed patient's hospitalization for abdominal pain  He was found have a small-bowel obstruction, was treated with nasogastric tube, IV fluids, and serial abdominal exams  He had a CT enterography done which showed no obstruction, and was then sent home  Since home, patient was worry about having bowel movements, but had a bowel movement this morning, appetite is okay, he still has some memory pain, but no significant abdominal pain  No fevers chills or sweats      HTN: BP has been a little up lately    Hyperchol: pt tolerating statin    Hypothyroid: pt reports compliance with thyroid med    TCM Call (since 2/25/2019)     Date and time call was made  3/25/2019  8:21 AM    Hospital care reviewed  Records reviewed    Patient was hospitial[facility] at  [facility]    Date of Admission  03/23/19    Date of discharge  03/24/19    Diagnosis  SBO    Disposition  Home    Were the patients medications reviewed and updated  No    Current Symptoms  Headache      TCM Call (since 2/25/2019)     Post hospital issues  None    Should patient be enrolled in anticoag monitoring? No    Scheduled for follow up? Yes    Do you need help managing your prescriptions or medications  Yes    I have advised the patient to call PCP with any new or worsening symptoms  Fermin Michel     Are you recieving any outpatient services  No    Are you recieving home care services  No    Are you using any community resources  No    Have you fallen in the last 12 months  No    Interperter language line needed  No    Counseling  Patient          The following portions of the patient's history were reviewed and updated as appropriate: allergies, current medications, past family history, past medical history, past social history, past surgical history and problem list     Review of Systems   Constitutional: Negative for chills, fatigue and fever  HENT: Negative for congestion, nosebleeds, postnasal drip, sore throat and trouble swallowing  Eyes: Negative for pain  Respiratory: Negative for cough, chest tightness, shortness of breath and wheezing  Cardiovascular: Negative for chest pain, palpitations and leg swelling  Gastrointestinal: Negative for abdominal pain, constipation, diarrhea, nausea and vomiting  Endocrine: Negative for polydipsia and polyuria  Genitourinary: Negative for dysuria, flank pain and hematuria  Musculoskeletal: Negative for arthralgias  Skin: Negative for rash  Neurological: Negative for dizziness, tremors and headaches  Hematological: Does not bruise/bleed easily  Psychiatric/Behavioral: Negative for confusion and dysphoric mood  The patient is not nervous/anxious  Objective:      /100   Pulse 57   Temp 98 2 °F (36 8 °C)   Ht 5' 2" (1 575 m)   Wt 68 kg (150 lb)   SpO2 98%   BMI 27 44 kg/m²          Physical Exam   Constitutional: He is oriented to person, place, and time  He appears well-developed and well-nourished  No distress  HENT:   Head: Normocephalic and atraumatic     Right Ear: External ear normal    Left Ear: External ear normal    Eyes: Conjunctivae are normal  No scleral icterus  Neck: Normal range of motion  Neck supple  No tracheal deviation present  No thyromegaly present  Cardiovascular: Normal rate, regular rhythm and normal heart sounds  No murmur heard  Pulmonary/Chest: Effort normal and breath sounds normal  No respiratory distress  He has no wheezes  He has no rales  Abdominal: Soft  Bowel sounds are normal  There is no tenderness  There is no rebound and no guarding  Musculoskeletal: He exhibits no edema  Lymphadenopathy:     He has no cervical adenopathy  Neurological: He is alert and oriented to person, place, and time  Psychiatric: He has a normal mood and affect  His behavior is normal  Judgment and thought content normal    Vitals reviewed

## 2019-03-28 NOTE — PATIENT INSTRUCTIONS
Problem List Items Addressed This Visit        Digestive    SBO (small bowel obstruction) ([facility] )     Improved, continue with healthy diet including lots of fruit which he does            Endocrine    Acquired hypothyroidism     Last thyroid function tests were normal, continue levothyroxine at 50 mcg daily  Cardiovascular and Mediastinum    Essential hypertension - Primary     I recommend increasing losartan from 50 mg daily to 100 mg daily, and continuing to monitor blood pressure         Relevant Medications    losartan (COZAAR) 100 MG tablet       Other    Mixed hyperlipidemia     Continue statin and healthy diet and exercise

## 2019-03-28 NOTE — ASSESSMENT & PLAN NOTE
I recommend increasing losartan from 50 mg daily to 100 mg daily, and continuing to monitor blood pressure

## 2019-04-05 DIAGNOSIS — E78.2 MIXED HYPERLIPIDEMIA: ICD-10-CM

## 2019-04-05 RX ORDER — ATORVASTATIN CALCIUM 10 MG/1
TABLET, FILM COATED ORAL
Qty: 90 TABLET | Refills: 1 | Status: SHIPPED | OUTPATIENT
Start: 2019-04-05 | End: 2019-09-27 | Stop reason: SDUPTHER

## 2019-04-13 ENCOUNTER — TELEPHONE (OUTPATIENT)
Dept: OTHER | Facility: OTHER | Age: 69
End: 2019-04-13

## 2019-04-15 ENCOUNTER — OFFICE VISIT (OUTPATIENT)
Dept: INTERNAL MEDICINE CLINIC | Facility: CLINIC | Age: 69
End: 2019-04-15
Payer: COMMERCIAL

## 2019-04-15 VITALS
WEIGHT: 148.6 LBS | DIASTOLIC BLOOD PRESSURE: 76 MMHG | BODY MASS INDEX: 27.34 KG/M2 | HEART RATE: 69 BPM | HEIGHT: 62 IN | SYSTOLIC BLOOD PRESSURE: 137 MMHG | TEMPERATURE: 98.2 F | OXYGEN SATURATION: 98 %

## 2019-04-15 DIAGNOSIS — J01.00 ACUTE NON-RECURRENT MAXILLARY SINUSITIS: Primary | ICD-10-CM

## 2019-04-15 PROCEDURE — 99213 OFFICE O/P EST LOW 20 MIN: CPT | Performed by: INTERNAL MEDICINE

## 2019-04-18 ENCOUNTER — OFFICE VISIT (OUTPATIENT)
Dept: OBGYN CLINIC | Facility: HOSPITAL | Age: 69
End: 2019-04-18
Payer: COMMERCIAL

## 2019-04-18 ENCOUNTER — HOSPITAL ENCOUNTER (OUTPATIENT)
Dept: RADIOLOGY | Facility: HOSPITAL | Age: 69
Discharge: HOME | End: 2019-04-18
Attending: ORTHOPAEDIC SURGERY
Payer: COMMERCIAL

## 2019-04-18 VITALS
WEIGHT: 150 LBS | DIASTOLIC BLOOD PRESSURE: 78 MMHG | BODY MASS INDEX: 27.6 KG/M2 | HEIGHT: 62 IN | SYSTOLIC BLOOD PRESSURE: 163 MMHG | HEART RATE: 68 BPM

## 2019-04-18 DIAGNOSIS — M17.0 BILATERAL PRIMARY OSTEOARTHRITIS OF KNEE: ICD-10-CM

## 2019-04-18 DIAGNOSIS — M17.0 BILATERAL PRIMARY OSTEOARTHRITIS OF KNEE: Primary | ICD-10-CM

## 2019-04-18 PROCEDURE — 99213 OFFICE O/P EST LOW 20 MIN: CPT | Performed by: ORTHOPAEDIC SURGERY

## 2019-04-18 PROCEDURE — 20610 DRAIN/INJ JOINT/BURSA W/O US: CPT | Performed by: ORTHOPAEDIC SURGERY

## 2019-04-18 PROCEDURE — 73562 X-RAY EXAM OF KNEE 3: CPT

## 2019-04-18 RX ADMIN — BETAMETHASONE SODIUM PHOSPHATE AND BETAMETHASONE ACETATE 6 MG: 3; 3 INJECTION, SUSPENSION INTRA-ARTICULAR; INTRALESIONAL; INTRAMUSCULAR; SOFT TISSUE at 15:33

## 2019-04-18 RX ADMIN — LIDOCAINE HYDROCHLORIDE 2 ML: 20 INJECTION, SOLUTION INFILTRATION; PERINEURAL at 15:33

## 2019-04-18 RX ADMIN — BUPIVACAINE HYDROCHLORIDE 2 ML: 5 INJECTION, SOLUTION EPIDURAL; INTRACAUDAL at 15:33

## 2019-04-20 RX ORDER — BUPIVACAINE HYDROCHLORIDE 5 MG/ML
2 INJECTION, SOLUTION EPIDURAL; INTRACAUDAL
Status: COMPLETED | OUTPATIENT
Start: 2019-04-18 | End: 2019-04-18

## 2019-04-20 RX ORDER — LIDOCAINE HYDROCHLORIDE 20 MG/ML
2 INJECTION, SOLUTION INFILTRATION; PERINEURAL
Status: COMPLETED | OUTPATIENT
Start: 2019-04-18 | End: 2019-04-18

## 2019-04-20 RX ORDER — BETAMETHASONE SODIUM PHOSPHATE AND BETAMETHASONE ACETATE 3; 3 MG/ML; MG/ML
6 INJECTION, SUSPENSION INTRA-ARTICULAR; INTRALESIONAL; INTRAMUSCULAR; SOFT TISSUE
Status: COMPLETED | OUTPATIENT
Start: 2019-04-18 | End: 2019-04-18

## 2019-04-25 ENCOUNTER — OFFICE VISIT (OUTPATIENT)
Dept: INTERNAL MEDICINE CLINIC | Facility: CLINIC | Age: 69
End: 2019-04-25
Payer: COMMERCIAL

## 2019-04-25 VITALS
DIASTOLIC BLOOD PRESSURE: 68 MMHG | BODY MASS INDEX: 27.09 KG/M2 | OXYGEN SATURATION: 96 % | WEIGHT: 147.2 LBS | HEIGHT: 62 IN | HEART RATE: 73 BPM | SYSTOLIC BLOOD PRESSURE: 120 MMHG | TEMPERATURE: 98.1 F

## 2019-04-25 DIAGNOSIS — H92.22 BLEEDING FROM LEFT EAR: Primary | ICD-10-CM

## 2019-04-25 PROBLEM — H92.12 OTORRHEA, LEFT: Status: ACTIVE | Noted: 2019-04-25

## 2019-04-25 PROBLEM — H61.21 IMPACTED CERUMEN OF RIGHT EAR: Status: ACTIVE | Noted: 2019-04-25

## 2019-04-25 PROCEDURE — 99213 OFFICE O/P EST LOW 20 MIN: CPT | Performed by: NURSE PRACTITIONER

## 2019-04-25 RX ORDER — FLUTICASONE PROPIONATE 50 MCG
1 SPRAY, SUSPENSION (ML) NASAL AS NEEDED
COMMUNITY
End: 2021-12-13

## 2019-04-26 PROBLEM — H92.22 BLEEDING FROM LEFT EAR: Status: ACTIVE | Noted: 2019-04-26

## 2019-06-05 ENCOUNTER — OFFICE VISIT (OUTPATIENT)
Dept: INTERNAL MEDICINE CLINIC | Facility: CLINIC | Age: 69
End: 2019-06-05
Payer: COMMERCIAL

## 2019-06-05 VITALS
DIASTOLIC BLOOD PRESSURE: 80 MMHG | WEIGHT: 145 LBS | RESPIRATION RATE: 14 BRPM | TEMPERATURE: 98.1 F | SYSTOLIC BLOOD PRESSURE: 144 MMHG | HEIGHT: 62 IN | BODY MASS INDEX: 26.68 KG/M2 | HEART RATE: 72 BPM

## 2019-06-05 DIAGNOSIS — J01.00 ACUTE NON-RECURRENT MAXILLARY SINUSITIS: Primary | ICD-10-CM

## 2019-06-05 DIAGNOSIS — I10 ESSENTIAL HYPERTENSION: ICD-10-CM

## 2019-06-05 PROCEDURE — 99213 OFFICE O/P EST LOW 20 MIN: CPT | Performed by: INTERNAL MEDICINE

## 2019-06-05 RX ORDER — AMOXICILLIN AND CLAVULANATE POTASSIUM 875; 125 MG/1; MG/1
1 TABLET, FILM COATED ORAL EVERY 12 HOURS SCHEDULED
Qty: 14 TABLET | Refills: 0 | Status: SHIPPED | OUTPATIENT
Start: 2019-06-05 | End: 2019-06-12

## 2019-06-09 ENCOUNTER — TELEPHONE (OUTPATIENT)
Dept: OTHER | Facility: OTHER | Age: 69
End: 2019-06-09

## 2019-06-10 ENCOUNTER — OFFICE VISIT (OUTPATIENT)
Dept: INTERNAL MEDICINE CLINIC | Facility: CLINIC | Age: 69
End: 2019-06-10
Payer: COMMERCIAL

## 2019-06-10 VITALS
TEMPERATURE: 98.9 F | OXYGEN SATURATION: 97 % | DIASTOLIC BLOOD PRESSURE: 76 MMHG | HEART RATE: 73 BPM | SYSTOLIC BLOOD PRESSURE: 148 MMHG | HEIGHT: 62 IN | BODY MASS INDEX: 26.87 KG/M2 | WEIGHT: 146 LBS

## 2019-06-10 DIAGNOSIS — J01.00 ACUTE NON-RECURRENT MAXILLARY SINUSITIS: Primary | ICD-10-CM

## 2019-06-10 PROCEDURE — 99213 OFFICE O/P EST LOW 20 MIN: CPT | Performed by: INTERNAL MEDICINE

## 2019-06-10 RX ORDER — DOXYCYCLINE 100 MG/1
100 CAPSULE ORAL 2 TIMES DAILY
Qty: 14 CAPSULE | Refills: 0 | Status: SHIPPED | OUTPATIENT
Start: 2019-06-10 | End: 2019-06-17

## 2019-06-17 ENCOUNTER — OFFICE VISIT (OUTPATIENT)
Dept: INTERNAL MEDICINE CLINIC | Facility: CLINIC | Age: 69
End: 2019-06-17
Payer: COMMERCIAL

## 2019-06-17 VITALS
OXYGEN SATURATION: 95 % | WEIGHT: 144 LBS | DIASTOLIC BLOOD PRESSURE: 76 MMHG | BODY MASS INDEX: 26.5 KG/M2 | HEIGHT: 62 IN | SYSTOLIC BLOOD PRESSURE: 120 MMHG | TEMPERATURE: 98.4 F | HEART RATE: 98 BPM

## 2019-06-17 DIAGNOSIS — J01.00 ACUTE NON-RECURRENT MAXILLARY SINUSITIS: Primary | ICD-10-CM

## 2019-06-17 DIAGNOSIS — H92.02 LEFT EAR PAIN: ICD-10-CM

## 2019-06-17 PROCEDURE — 99213 OFFICE O/P EST LOW 20 MIN: CPT | Performed by: INTERNAL MEDICINE

## 2019-06-17 RX ORDER — NEOMYCIN SULFATE, POLYMYXIN B SULFATE AND HYDROCORTISONE 10; 3.5; 1 MG/ML; MG/ML; [USP'U]/ML
4 SUSPENSION/ DROPS AURICULAR (OTIC) EVERY 8 HOURS SCHEDULED
Qty: 10 ML | Refills: 0 | Status: SHIPPED | OUTPATIENT
Start: 2019-06-17 | End: 2019-11-07 | Stop reason: ALTCHOICE

## 2019-06-18 ENCOUNTER — TELEPHONE (OUTPATIENT)
Dept: INTERNAL MEDICINE CLINIC | Facility: CLINIC | Age: 69
End: 2019-06-18

## 2019-08-21 PROBLEM — H69.93 ETD (EUSTACHIAN TUBE DYSFUNCTION), BILATERAL: Status: ACTIVE | Noted: 2019-08-21

## 2019-08-21 PROBLEM — H90.3 SENSORINEURAL HEARING LOSS (SNHL), BILATERAL: Status: ACTIVE | Noted: 2019-08-21

## 2019-08-21 PROBLEM — H69.83 ETD (EUSTACHIAN TUBE DYSFUNCTION), BILATERAL: Status: ACTIVE | Noted: 2019-08-21

## 2019-08-27 ENCOUNTER — TELEPHONE (OUTPATIENT)
Dept: FAMILY MEDICINE CLINIC | Facility: CLINIC | Age: 69
End: 2019-08-27

## 2019-08-27 NOTE — TELEPHONE ENCOUNTER
Patient called to schedule a new patient appointment with Dr Julio Cesar Ruiz as his wife (angel) asked Dr Julio Cesar Ruiz when she was out on a walk to see if she would take him in as a new patient  He is currently scheduled for 10/24/19 to see Dr Julio Cesar Ruiz  Please advise if this is okay? Thank you

## 2019-09-19 ENCOUNTER — OFFICE VISIT (OUTPATIENT)
Dept: OBGYN CLINIC | Facility: HOSPITAL | Age: 69
End: 2019-09-19
Payer: COMMERCIAL

## 2019-09-19 VITALS
BODY MASS INDEX: 27.23 KG/M2 | HEART RATE: 58 BPM | DIASTOLIC BLOOD PRESSURE: 79 MMHG | WEIGHT: 148 LBS | HEIGHT: 62 IN | SYSTOLIC BLOOD PRESSURE: 144 MMHG

## 2019-09-19 DIAGNOSIS — M17.0 BILATERAL PRIMARY OSTEOARTHRITIS OF KNEE: Primary | ICD-10-CM

## 2019-09-19 PROCEDURE — 20610 DRAIN/INJ JOINT/BURSA W/O US: CPT | Performed by: ORTHOPAEDIC SURGERY

## 2019-09-19 PROCEDURE — 99213 OFFICE O/P EST LOW 20 MIN: CPT | Performed by: ORTHOPAEDIC SURGERY

## 2019-09-19 RX ADMIN — BUPIVACAINE HYDROCHLORIDE 2 ML: 2.5 INJECTION, SOLUTION INFILTRATION; PERINEURAL at 16:29

## 2019-09-19 RX ADMIN — LIDOCAINE HYDROCHLORIDE 2 ML: 10 INJECTION, SOLUTION INFILTRATION; PERINEURAL at 16:29

## 2019-09-19 RX ADMIN — BETAMETHASONE SODIUM PHOSPHATE AND BETAMETHASONE ACETATE 12 MG: 3; 3 INJECTION, SUSPENSION INTRA-ARTICULAR; INTRALESIONAL; INTRAMUSCULAR; SOFT TISSUE at 16:29

## 2019-09-19 NOTE — PROGRESS NOTES
Orthopaedic Surgery - Office Note  Dae Barton (77 y o  male)   : 1950   MRN: [id number]  Encounter Date: 2019    Chief Complaint   Patient presents with    Left Knee - Follow-up    Right Knee - Follow-up       Assessment / Plan  Bilateral knee OA    · CSI of bilateral knee joint was performed and tolerated well  · Activities as tolerated, WBAT  · Injection may be repeated after 3 months  Return in about 3 months (around 2019)  History of Present Illness  Dae Barton is a 77 y o  male who presents for follow up regarding his bilateral knee OA  He reports that his symptoms have returned but the injections provided at his last visit 19 was beneficial  He is interested in repeat injections today  He continues to ambulate without assistance  Review of Systems  Pertinent items are noted in HPI  All other systems were reviewed and are negative  Physical Exam  /79   Pulse 58   Ht 5' 2" (1 575 m)   Wt 67 1 kg (148 lb)   BMI 27 07 kg/m²   Cons: Appears well  No apparent distress  Psych: Alert  Oriented x3  Mood and affect normal   Eyes: PERRLA, EOMI  Resp: Normal effort  No audible wheezing or stridor  CV: Palpable pulse  No discernable arrhythmia  No LE edema  Lymph:  No palpable cervical, axillary, or inguinal lymphadenopathy  Skin: Warm  No palpable masses  No visible lesions  Neuro: Normal muscle tone  Normal and symmetric DTR's  Bilateral Knee Exam  Alignment:  mild genu varus  Inspection:  No swelling  No ecchymosis  No deformity  Palpation:  medial joint tenderness  ROM:  Normal knee ROM  Strength:  5/5 hip flexors and abductors  5/5 quadriceps and hamstrings  Stability:  No objective knee instability  Stable Varus / Valgus stress, Lachman, and Posterior drawer  Tests:  No pertinent positive or negative tests  Patella:  Patella tracks centrally with crepitus  Neurovascular:  Sensation intact in DP/SP/Pemberton/Sa/T nerve distributions    2+ DP & PT pulses  Brisk capillary refill in all toes  Toes warm and perfused  Gait:  Steady  Studies Reviewed  No studies to review    Large joint arthrocentesis: bilateral knee  Date/Time: 2019 4:29 PM  Consent given by: patient  Site marked: site marked  Timeout: Immediately prior to procedure a time out was called to verify the correct patient, procedure, equipment, support staff and site/side marked as required   Supporting Documentation  Indications: pain   Procedure Details  Location: knee - bilateral knee  Preparation: Patient was prepped and draped in the usual sterile fashion  Ultrasound guidance: no  Approach: anterolateral    Medications (Right): 2 mL bupivacaine 0 25 %; 2 mL lidocaine 1 %; 12 mg betamethasone acetate-betamethasone sodium phosphate 6 (3-3) mg/mLMedications (Left): 2 mL bupivacaine 0 25 %; 2 mL lidocaine 1 %; 12 mg betamethasone acetate-betamethasone sodium phosphate 6 (3-3) mg/mL   Patient tolerance: patient tolerated the procedure well with no immediate complications  Dressing:  Sterile dressing applied             Medical, Surgical, Family, and Social History  The patient's medical history, family history, and social history, were reviewed and updated as appropriate      Past Medical History:   Diagnosis Date    Angina effort     Anxiety     Roberts's esophagus     Chest pain, unspecified     GERD (gastroesophageal reflux disease)     Hyperlipidemia     Hypertension     Migraine        Past Surgical History:   Procedure Laterality Date    CATARACT EXTRACTION      EYE SURGERY      HERNIA REPAIR Right        Family History   Problem Relation Age of Onset    No Known Problems Mother     Heart attack Father        Social History     Occupational History    Not on file   Tobacco Use    Smoking status: Former Smoker     Packs/day: 0 25     Years: 4 00     Pack years: 1 00     Types: Cigarettes     Last attempt to quit:      Years since quittin 7    Smokeless tobacco: Never Used   Substance and Sexual Activity    Alcohol use: Yes     Frequency: 2-3 times a week     Drinks per session: 1 or 2     Binge frequency: Never    Drug use: Never    Sexual activity: Not on file       Allergies   Allergen Reactions    Nitrous Oxide          Current Outpatient Medications:     aspirin 81 MG tablet, Take by mouth, Disp: , Rfl:     atorvastatin (LIPITOR) 10 mg tablet, TAKE 1 TABLET BY MOUTH EVERY DAY, Disp: 90 tablet, Rfl: 1    esomeprazole (NexIUM) 20 mg capsule, Take 20 mg by mouth every morning before breakfast, Disp: , Rfl:     fluticasone (FLONASE) 50 mcg/act nasal spray, 1 spray into each nostril daily, Disp: , Rfl:     latanoprost (XALATAN) 0 005 % ophthalmic solution, Administer 1 drop to both eyes , Disp: , Rfl:     levothyroxine 50 mcg tablet, TAKE 1 TABLET BY MOUTH EVERY DAY ON AN EMPTY STOMACH, Disp: 90 tablet, Rfl: 3    losartan (COZAAR) 100 MG tablet, Take 1 tablet (100 mg total) by mouth daily, Disp: 90 tablet, Rfl: 3    neomycin-polymyxin-hydrocortisone (CORTISPORIN) 0 35%-10,000 units/mL-1% otic suspension, Administer 4 drops into the left ear every 8 (eight) hours, Disp: 10 mL, Rfl: 0    rizatriptan (MAXALT) 10 MG tablet, Take 1 tablet by mouth, Disp: , Rfl:     timolol (TIMOPTIC) 0 5 % ophthalmic solution, Administer 1 drop to both eyes 2 (two) times a day , Disp: , Rfl:       Geronimo Servin MA    Scribe Attestation    I,:   Geronimo Servin, MA am acting as a scribe while in the presence of the attending physician :        I,:   Jose Weiss MD personally performed the services described in this documentation    as scribed in my presence :

## 2019-09-26 RX ORDER — BETAMETHASONE SODIUM PHOSPHATE AND BETAMETHASONE ACETATE 3; 3 MG/ML; MG/ML
12 INJECTION, SUSPENSION INTRA-ARTICULAR; INTRALESIONAL; INTRAMUSCULAR; SOFT TISSUE
Status: COMPLETED | OUTPATIENT
Start: 2019-09-19 | End: 2019-09-19

## 2019-09-26 RX ORDER — LIDOCAINE HYDROCHLORIDE 10 MG/ML
2 INJECTION, SOLUTION INFILTRATION; PERINEURAL
Status: COMPLETED | OUTPATIENT
Start: 2019-09-19 | End: 2019-09-19

## 2019-09-26 RX ORDER — BUPIVACAINE HYDROCHLORIDE 2.5 MG/ML
2 INJECTION, SOLUTION INFILTRATION; PERINEURAL
Status: COMPLETED | OUTPATIENT
Start: 2019-09-19 | End: 2019-09-19

## 2019-09-27 DIAGNOSIS — E78.2 MIXED HYPERLIPIDEMIA: ICD-10-CM

## 2019-09-27 RX ORDER — ATORVASTATIN CALCIUM 10 MG/1
TABLET, FILM COATED ORAL
Qty: 90 TABLET | Refills: 3 | Status: SHIPPED | OUTPATIENT
Start: 2019-09-27 | End: 2020-09-14 | Stop reason: SDUPTHER

## 2019-11-07 ENCOUNTER — OFFICE VISIT (OUTPATIENT)
Dept: FAMILY MEDICINE CLINIC | Facility: CLINIC | Age: 69
End: 2019-11-07
Payer: COMMERCIAL

## 2019-11-07 VITALS
SYSTOLIC BLOOD PRESSURE: 140 MMHG | DIASTOLIC BLOOD PRESSURE: 90 MMHG | OXYGEN SATURATION: 98 % | HEIGHT: 61 IN | RESPIRATION RATE: 16 BRPM | BODY MASS INDEX: 27.94 KG/M2 | TEMPERATURE: 98.2 F | WEIGHT: 148 LBS | HEART RATE: 70 BPM

## 2019-11-07 DIAGNOSIS — E78.2 MIXED HYPERLIPIDEMIA: ICD-10-CM

## 2019-11-07 DIAGNOSIS — M17.0 BILATERAL PRIMARY OSTEOARTHRITIS OF KNEE: ICD-10-CM

## 2019-11-07 DIAGNOSIS — H35.341 MACULAR HOLE, RIGHT EYE: ICD-10-CM

## 2019-11-07 DIAGNOSIS — N64.4 BREAST PAIN IN MALE: ICD-10-CM

## 2019-11-07 DIAGNOSIS — M17.12 PRIMARY OSTEOARTHRITIS OF LEFT KNEE: Primary | ICD-10-CM

## 2019-11-07 DIAGNOSIS — Z12.5 SCREENING FOR PROSTATE CANCER: ICD-10-CM

## 2019-11-07 DIAGNOSIS — I10 ESSENTIAL HYPERTENSION: ICD-10-CM

## 2019-11-07 DIAGNOSIS — Z00.00 MEDICARE ANNUAL WELLNESS VISIT, INITIAL: ICD-10-CM

## 2019-11-07 DIAGNOSIS — E03.9 ACQUIRED HYPOTHYROIDISM: Primary | ICD-10-CM

## 2019-11-07 PROBLEM — R05.9 COUGH: Status: RESOLVED | Noted: 2018-02-13 | Resolved: 2019-11-07

## 2019-11-07 PROBLEM — J01.00 ACUTE NON-RECURRENT MAXILLARY SINUSITIS: Status: RESOLVED | Noted: 2019-04-15 | Resolved: 2019-11-07

## 2019-11-07 PROBLEM — H61.21 IMPACTED CERUMEN OF RIGHT EAR: Status: RESOLVED | Noted: 2019-04-25 | Resolved: 2019-11-07

## 2019-11-07 PROBLEM — H92.12 OTORRHEA, LEFT: Status: RESOLVED | Noted: 2019-04-25 | Resolved: 2019-11-07

## 2019-11-07 PROBLEM — K56.609 SBO (SMALL BOWEL OBSTRUCTION) (HCC): Status: RESOLVED | Noted: 2019-03-23 | Resolved: 2019-11-07

## 2019-11-07 PROBLEM — H92.22 BLEEDING FROM LEFT EAR: Status: RESOLVED | Noted: 2019-04-26 | Resolved: 2019-11-07

## 2019-11-07 PROCEDURE — G0438 PPPS, INITIAL VISIT: HCPCS | Performed by: FAMILY MEDICINE

## 2019-11-07 PROCEDURE — 99214 OFFICE O/P EST MOD 30 MIN: CPT | Performed by: FAMILY MEDICINE

## 2019-11-07 RX ORDER — BRIMONIDINE TARTRATE/TIMOLOL 0.2%-0.5%
DROPS OPHTHALMIC (EYE)
COMMUNITY
Start: 2019-09-25 | End: 2020-01-13 | Stop reason: SINTOL

## 2019-11-07 NOTE — PROGRESS NOTES
Assessment/Plan:    1  Acquired hypothyroidism  Assessment & Plan:  Check tsh  Some weight gain will see if related to med adjustment      2  Medicare annual wellness visit, initial  Assessment & Plan:  Allergic to flu shot      3  Essential hypertension  Assessment & Plan:  Taking losartan   Will come back in 2 weeks for bp check with his home monitor    Orders:  -     CBC; Future; Expected date: 11/07/2019  -     Comprehensive metabolic panel; Future; Expected date: 11/07/2019    4  Mixed hyperlipidemia  Assessment & Plan:  Check lipids    Orders:  -     Lipid Panel with Direct LDL reflex; Future; Expected date: 11/07/2019  -     TSH, 3rd generation with Free T4 reflex; Future; Expected date: 11/07/2019    5  Screening for prostate cancer  -     PSA, Total Screen; Future    6  Breast pain in male  Assessment & Plan:  Check US of both breast    Orders:  -     US breast left complete (abus); Future; Expected date: 11/07/2019  -     US breast right complete (abus); Future; Expected date: 11/07/2019    7  Macular hole, right eye  Assessment & Plan:  Had surgery to correct-vision is better        BMI Counseling: Body mass index is 27 59 kg/m²  Discussed the patient's BMI with him  The BMI is above normal  Nutrition recommendations include reducing portion sizes and 3-5 servings of fruits/vegetables daily  Exercise recommendations include exercising 3-5 times per week  Patient Instructions       Medicare Preventive Visit Patient Instructions  Thank you for completing your Welcome to Medicare Visit or Medicare Annual Wellness Visit today  Your next wellness visit will be due in one year (11/7/2020)  The screening/preventive services that you may require over the next 5-10 years are detailed below  Some tests may not apply to you based off risk factors and/or age  Screening tests ordered at today's visit but not completed yet may show as past due   Also, please note that scanned in results may not display below   Preventive Screenings:  Service Recommendations Previous Testing/Comments   Colorectal Cancer Screening  · Colonoscopy    · Fecal Occult Blood Test (FOBT)/Fecal Immunochemical Test (FIT)  · Fecal DNA/Cologuard Test  · Flexible Sigmoidoscopy Age: 54-65 years old   Colonoscopy: every 10 years (May be performed more frequently if at higher risk)  OR  FOBT/FIT: every 1 year  OR  Cologuard: every 3 years  OR  Sigmoidoscopy: every 5 years  Screening may be recommended earlier than age 48 if at higher risk for colorectal cancer  Also, an individualized decision between you and your healthcare provider will decide whether screening between the ages of 74-80 would be appropriate  Colonoscopy: 08/19/2015  FOBT/FIT: 10/02/2017  Cologuard: Not on file  Sigmoidoscopy: Not on file         Prostate Cancer Screening Individualized decision between patient and health care provider in men between ages of 53-78   Medicare will cover every 12 months beginning on the day after your 50th birthday PSA: 1 4 ng/mL          Hepatitis C Screening Once for adults born between 1945 and 1965  More frequently in patients at high risk for Hepatitis C Hep C Antibody: 09/13/2017       Diabetes Screening 1-2 times per year if you're at risk for diabetes or have pre-diabetes Fasting glucose: 89 mg/dL   A1C: No results in last 5 years       Cholesterol Screening Once every 5 years if you don't have a lipid disorder  May order more often based on risk factors  Lipid panel: 09/25/2018          Other Preventive Screenings Covered by Medicare:  1  Abdominal Aortic Aneurysm (AAA) Screening: covered once if your at risk  You're considered to be at risk if you have a family history of AAA or a male between the age of 73-68 who smoking at least 100 cigarettes in your lifetime    2  Lung Cancer Screening: covers low dose CT scan once per year if you meet all of the following conditions: (1) Age 50-69; (2) No signs or symptoms of lung cancer; (3) Current smoker or have quit smoking within the last 15 years; (4) You have a tobacco smoking history of at least 30 pack years (packs per day x number of years you smoked); (5) You get a written order from a healthcare provider  3  Glaucoma Screening: covered annually if you're considered high risk: (1) You have diabetes OR (2) Family history of glaucoma OR (3)  aged 48 and older OR (3)  American aged 72 and older  3  Osteoporosis Screening: covered every 2 years if you meet one of the following conditions: (1) Have a vertebral abnormality; (2) On glucocorticoid therapy for more than 3 months; (3) Have primary hyperparathyroidism; (4) On osteoporosis medications and need to assess response to drug therapy  5  HIV Screening: covered annually if you're between the age of 12-76  Also covered annually if you are younger than 13 and older than 72 with risk factors for HIV infection  For pregnant patients, it is covered up to 3 times per pregnancy  Immunizations:  Immunization Recommendations   Influenza Vaccine Annual influenza vaccination during flu season is recommended for all persons aged >= 6 months who do not have contraindications   Pneumococcal Vaccine (Prevnar and Pneumovax)  * Prevnar = PCV13  * Pneumovax = PPSV23 Adults 25-60 years old: 1-3 doses may be recommended based on certain risk factors  Adults 72 years old: Prevnar (PCV13) vaccine recommended followed by Pneumovax (PPSV23) vaccine  If already received PPSV23 since turning 65, then PCV13 recommended at least one year after PPSV23 dose  Hepatitis B Vaccine 3 dose series if at intermediate or high risk (ex: diabetes, end stage renal disease, liver disease)   Tetanus (Td) Vaccine - COST NOT COVERED BY MEDICARE PART B Following completion of primary series, a booster dose should be given every 10 years to maintain immunity against tetanus  Td may also be given as tetanus wound prophylaxis     Tdap Vaccine - COST NOT COVERED BY MEDICARE PART B Recommended at least once for all adults  For pregnant patients, recommended with each pregnancy  Shingles Vaccine (Shingrix) - COST NOT COVERED BY MEDICARE PART B  2 shot series recommended in those aged 48 and above     Health Maintenance Due:      Topic Date Due    CRC Screening: Colonoscopy  08/19/2025    Hepatitis C Screening  Completed     Immunizations Due:  There are no preventive care reminders to display for this patient  Advance Directives   What are advance directives? Advance directives are legal documents that state your wishes and plans for medical care  These plans are made ahead of time in case you lose your ability to make decisions for yourself  Advance directives can apply to any medical decision, such as the treatments you want, and if you want to donate organs  What are the types of advance directives? There are many types of advance directives, and each state has rules about how to use them  You may choose a combination of any of the following:  · Living will: This is a written record of the treatment you want  You can also choose which treatments you do not want, which to limit, and which to stop at a certain time  This includes surgery, medicine, IV fluid, and tube feedings  · Durable power of  for healthcare [facility]): This is a written record that states who you want to make healthcare choices for you when you are unable to make them for yourself  This person, called a proxy, is usually a family member or a friend  You may choose more than 1 proxy  · Do not resuscitate (DNR) order:  A DNR order is used in case your heart stops beating or you stop breathing  It is a request not to have certain forms of treatment, such as CPR  A DNR order may be included in other types of advance directives  · Medical directive: This covers the care that you want if you are in a coma, near death, or unable to make decisions for yourself   You can list the treatments you want for each condition  Treatment may include pain medicine, surgery, blood transfusions, dialysis, IV or tube feedings, and a ventilator (breathing machine)  · Values history: This document has questions about your views, beliefs, and how you feel and think about life  This information can help others choose the care that you would choose  Why are advance directives important? An advance directive helps you control your care  Although spoken wishes may be used, it is better to have your wishes written down  Spoken wishes can be misunderstood, or not followed  Treatments may be given even if you do not want them  An advance directive may make it easier for your family to make difficult choices about your care  Weight Management   Why it is important to manage your weight:  Being overweight increases your risk of health conditions such as heart disease, high blood pressure, type 2 diabetes, and certain types of cancer  It can also increase your risk for osteoarthritis, sleep apnea, and other respiratory problems  Aim for a slow, steady weight loss  Even a small amount of weight loss can lower your risk of health problems  How to lose weight safely:  A safe and healthy way to lose weight is to eat fewer calories and get regular exercise  You can lose up about 1 pound a week by decreasing the number of calories you eat by 500 calories each day  Healthy meal plan for weight management:  A healthy meal plan includes a variety of foods, contains fewer calories, and helps you stay healthy  A healthy meal plan includes the following:  · Eat whole-grain foods more often  A healthy meal plan should contain fiber  Fiber is the part of grains, fruits, and vegetables that is not broken down by your body  Whole-grain foods are healthy and provide extra fiber in your diet  Some examples of whole-grain foods are whole-wheat breads and pastas, oatmeal, brown rice, and bulgur  · Eat a variety of vegetables every day  Include dark, leafy greens such as spinach, kale, fabien greens, and mustard greens  Eat yellow and orange vegetables such as carrots, sweet potatoes, and winter squash  · Eat a variety of fruits every day  Choose fresh or canned fruit (canned in its own juice or light syrup) instead of juice  Fruit juice has very little or no fiber  · Eat low-fat dairy foods  Drink fat-free (skim) milk or 1% milk  Eat fat-free yogurt and low-fat cottage cheese  Try low-fat cheeses such as mozzarella and other reduced-fat cheeses  · Choose meat and other protein foods that are low in fat  Choose beans or other legumes such as split peas or lentils  Choose fish, skinless poultry (chicken or turkey), or lean cuts of red meat (beef or pork)  Before you cook meat or poultry, cut off any visible fat  · Use less fat and oil  Try baking foods instead of frying them  Add less fat, such as margarine, sour cream, regular salad dressing and mayonnaise to foods  Eat fewer high-fat foods  Some examples of high-fat foods include french fries, doughnuts, ice cream, and cakes  · Eat fewer sweets  Limit foods and drinks that are high in sugar  This includes candy, cookies, regular soda, and sweetened drinks  Exercise:  Exercise at least 30 minutes per day on most days of the week  Some examples of exercise include walking, biking, dancing, and swimming  You can also fit in more physical activity by taking the stairs instead of the elevator or parking farther away from stores  Ask your healthcare provider about the best exercise plan for you  © Copyright [employer] 2018 Information is for End User's use only and may not be sold, redistributed or otherwise used for commercial purposes  All illustrations and images included in CareNotes® are the copyrighted property of [employer]  or Arcelia Shah in 1 year (on 11/7/2020)  Subjective:      Patient ID: Anuj Aldridge is a 77 y o  male      Chief Complaint Patient presents with   [facility] Wellness Visit     Patient here for Medicare wellness exam        Here for follow up and awv  Here as a new pt  Had major issue swith macular hole  hhad surgery in Feb- some residual vision loss  bp's better-at cardiologist  Complaining of breast tenderness  Has gained weight  - cardio and weights    Hypertension   This is a chronic problem  The current episode started more than 1 year ago  The problem is unchanged  The problem is controlled  Pertinent negatives include no chest pain or shortness of breath  Risk factors for coronary artery disease include dyslipidemia  Past treatments include angiotensin blockers  The current treatment provides significant improvement  There are no compliance problems  The following portions of the patient's history were reviewed and updated as appropriate: allergies, current medications, past family history, past medical history, past social history, past surgical history and problem list     Review of Systems   Constitutional: Negative  HENT: Negative  Eyes: Negative  Respiratory: Negative for shortness of breath  Cardiovascular: Negative for chest pain  Gastrointestinal: Negative  Endocrine: Negative  Genitourinary: Negative  Musculoskeletal: Positive for arthralgias (knee pain)  Breast pain   Skin: Negative  Allergic/Immunologic: Negative  Neurological: Negative  Hematological: Negative  Psychiatric/Behavioral: Negative            Current Outpatient Medications   Medication Sig Dispense Refill    aspirin 81 MG tablet Take by mouth      atorvastatin (LIPITOR) 10 mg tablet TAKE 1 TABLET BY MOUTH EVERY DAY 90 tablet 3    COMBIGAN 0 2-0 5 %       esomeprazole (NexIUM) 20 mg capsule Take 20 mg by mouth every morning before breakfast      fluticasone (FLONASE) 50 mcg/act nasal spray 1 spray into each nostril as needed       latanoprost (XALATAN) 0 005 % ophthalmic solution Administer 1 drop to both eyes       levothyroxine 50 mcg tablet TAKE 1 TABLET BY MOUTH EVERY DAY ON AN EMPTY STOMACH 90 tablet 3    losartan (COZAAR) 100 MG tablet Take 1 tablet (100 mg total) by mouth daily 90 tablet 3    rizatriptan (MAXALT) 10 MG tablet Take 1 tablet by mouth as needed       timolol (TIMOPTIC) 0 5 % ophthalmic solution Administer 1 drop to both eyes 2 (two) times a day        No current facility-administered medications for this visit  Objective:    /90   Pulse 70   Temp 98 2 °F (36 8 °C)   Resp 16   Ht 5' 1 42" (1 56 m)   Wt 67 1 kg (148 lb)   SpO2 98%   BMI 27 59 kg/m²        Physical Exam   Constitutional: He is oriented to person, place, and time  Vital signs are normal  He appears well-developed and well-nourished  HENT:   Head: Normocephalic and atraumatic  Right Ear: External ear normal    Left Ear: External ear normal    Nose: Nose normal    Mouth/Throat: Oropharynx is clear and moist    Eyes: Pupils are equal, round, and reactive to light  Conjunctivae, EOM and lids are normal    Neck: Normal range of motion  Neck supple  Cardiovascular: Normal rate, regular rhythm, S1 normal, S2 normal, normal heart sounds, intact distal pulses and normal pulses  Pulmonary/Chest: Effort normal and breath sounds normal    Abdominal: Soft  Normal appearance and bowel sounds are normal    Musculoskeletal: Normal range of motion  Neurological: He is alert and oriented to person, place, and time  He has normal strength and normal reflexes  Skin: Skin is warm and dry  Psychiatric: He has a normal mood and affect  His speech is normal and behavior is normal  Judgment and thought content normal  Cognition and memory are normal    Nursing note and vitals reviewed               Suni Silverman DO

## 2019-11-07 NOTE — PATIENT INSTRUCTIONS

## 2019-11-07 NOTE — PROGRESS NOTES
Assessment and Plan:     Problem List Items Addressed This Visit        Other    Medicare annual wellness visit, initial - Primary     Allergic to flu shot             BMI Counseling: Body mass index is 27 59 kg/m²  The BMI is above normal  Nutrition recommendations include encouraging healthy choices of fruits and vegetables  Exercise recommendations include exercising 3-5 times per week  No pharmacotherapy was ordered  Preventive health issues were discussed with patient, and age appropriate screening tests were ordered as noted in patient's After Visit Summary  Personalized health advice and appropriate referrals for health education or preventive services given if needed, as noted in patient's After Visit Summary       History of Present Illness:     Patient presents for Medicare Annual Wellness visit    Patient Care Team:  Fiorella Bright DO as PCP - General (Family Medicine)  Alexa Gleason MD     Problem List:     Patient Active Problem List   Diagnosis    Roberts's esophagus    Acquired hypothyroidism    Chronic GERD    Headache    Essential hypertension    Mixed hyperlipidemia    Encounter for Medicare annual wellness exam    SBO (small bowel obstruction) ([address] )    ETD (Eustachian tube dysfunction), bilateral    Sensorineural hearing loss (SNHL), bilateral    Macular hole, right eye    Osteoarthrosis, localized, primary, knee, right    Primary localized osteoarthritis of left knee    Medicare annual wellness visit, initial      Past Medical and Surgical History:     Past Medical History:   Diagnosis Date    Acute non-recurrent maxillary sinusitis 4/15/2019    Angina effort     Anxiety     Roberts's esophagus     Bleeding from left ear 4/26/2019    Chest pain, unspecified     Cough 2/13/2018    GERD (gastroesophageal reflux disease)     Hyperlipidemia     Hypertension     Impacted cerumen of right ear 4/25/2019    Migraine     Otorrhea, left 4/25/2019     Past Surgical History:   Procedure Laterality Date    CATARACT EXTRACTION      EYE SURGERY      HERNIA REPAIR Right 2014      Family History:     Family History   Problem Relation Age of Onset    No Known Problems Mother     Heart attack Father       Social History:     Social History     Socioeconomic History    Marital status: /Civil Union     Spouse name: Not on file    Number of children: Not on file    Years of education: Not on file    Highest education level: Not on file   Occupational History    Not on file   Social Needs    Financial resource strain: Not on file    Food insecurity:     Worry: Not on file     Inability: Not on file    Transportation needs:     Medical: Not on file     Non-medical: Not on file   Tobacco Use    Smoking status: Former Smoker     Packs/day: 0 25     Years: 4 00     Pack years: 1 00     Types: Cigarettes     Last attempt to quit:      Years since quittin 8    Smokeless tobacco: Never Used   Substance and Sexual Activity    Alcohol use: Yes     Frequency: 2-3 times a week     Drinks per session: 1 or 2     Binge frequency: Never    Drug use: Never    Sexual activity: Not on file   Lifestyle    Physical activity:     Days per week: Not on file     Minutes per session: Not on file    Stress: Not on file   Relationships    Social connections:     Talks on phone: Not on file     Gets together: Not on file     Attends [employer] service: Not on file     Active member of club or organization: Not on file     Attends meetings of clubs or organizations: Not on file     Relationship status: Not on file    Intimate partner violence:     Fear of current or ex partner: Not on file     Emotionally abused: Not on file     Physically abused: Not on file     Forced sexual activity: Not on file   Other Topics Concern    Not on file   Social History Narrative    Not on file       Medications and Allergies:     Current Outpatient Medications   Medication Sig Dispense Refill  aspirin 81 MG tablet Take by mouth      atorvastatin (LIPITOR) 10 mg tablet TAKE 1 TABLET BY MOUTH EVERY DAY 90 tablet 3    COMBIGAN 0 2-0 5 %       esomeprazole (NexIUM) 20 mg capsule Take 20 mg by mouth every morning before breakfast      fluticasone (FLONASE) 50 mcg/act nasal spray 1 spray into each nostril as needed       latanoprost (XALATAN) 0 005 % ophthalmic solution Administer 1 drop to both eyes       levothyroxine 50 mcg tablet TAKE 1 TABLET BY MOUTH EVERY DAY ON AN EMPTY STOMACH 90 tablet 3    losartan (COZAAR) 100 MG tablet Take 1 tablet (100 mg total) by mouth daily 90 tablet 3    rizatriptan (MAXALT) 10 MG tablet Take 1 tablet by mouth as needed       timolol (TIMOPTIC) 0 5 % ophthalmic solution Administer 1 drop to both eyes 2 (two) times a day        No current facility-administered medications for this visit  Allergies   Allergen Reactions    Influenza Vaccines Other (See Comments)     Flu like symptoms     Nitrous Oxide       Immunizations:     Immunization History   Administered Date(s) Administered    Hep A, adult 06/30/2017    Influenza Split High Dose Preservative Free IM 09/30/2014, 11/02/2015, 12/03/2017    Pneumococcal Conjugate 13-Valent 08/28/2017    Pneumococcal Polysaccharide PPV23 05/30/2014    Tdap 02/15/2018    Zoster 06/24/2014      Health Maintenance:         Topic Date Due    CRC Screening: Colonoscopy  08/19/2025    Hepatitis C Screening  Completed     There are no preventive care reminders to display for this patient  Medicare Health Risk Assessment:     /80   Pulse 70   Temp 98 2 °F (36 8 °C)   Resp 16   Ht 5' 1 42" (1 56 m)   Wt 67 1 kg (148 lb)   SpO2 98%   BMI 27 59 kg/m²      Chadwick Loyola is here for his Initial Wellness visit  Health Risk Assessment:   Patient rates overall health as fair  Patient feels that their physical health rating is slightly worse  Eyesight was rated as much worse  Hearing was rated as same   Patient feels that their emotional and mental health rating is slightly worse  Pain experienced in the last 7 days has been some  Patient's pain rating has been 4/10  Depression Screening:   PHQ-2 Score: 0      Fall Risk Screening: In the past year, patient has experienced: no history of falling in past year      Home Safety:  Patient has trouble with stairs inside or outside of their home  Patient has working smoke alarms and has working carbon monoxide detector  Home safety hazards include: none  Medications:   Patient is currently taking over-the-counter supplements  OTC medications include: see medication list  Patient is able to manage medications  Activities of Daily Living (ADLs)/Instrumental Activities of Daily Living (IADLs):   Walk and transfer into and out of bed and chair?: Yes  Dress and groom yourself?: Yes    Bathe or shower yourself?: Yes    Feed yourself? Yes  Do your laundry/housekeeping?: Yes  Manage your money, pay your bills and track your expenses?: Yes  Make your own meals?: Yes    Do your own shopping?: Yes    Previous Hospitalizations:   Any hospitalizations or ED visits within the last 12 months?: No      Advance Care Planning:   Living will: Yes    Durable POA for healthcare:  Yes    Advanced directive: Yes      PREVENTIVE SCREENINGS      Cardiovascular Screening:    General: Screening Not Indicated and History Lipid Disorder      Diabetes Screening:     General: Screening Current      Colorectal Cancer Screening:     General: Screening Current      Abdominal Aortic Aneurysm (AAA) Screening:    Risk factors include: age between 73-67 yo and tobacco use        Hepatitis C Screening:    General: Screening Current      Nasrin Enriquez DO

## 2019-11-11 ENCOUNTER — TELEPHONE (OUTPATIENT)
Dept: OBGYN CLINIC | Facility: HOSPITAL | Age: 69
End: 2019-11-11

## 2019-11-11 ENCOUNTER — APPOINTMENT (OUTPATIENT)
Dept: LAB | Age: 69
End: 2019-11-11
Payer: COMMERCIAL

## 2019-11-11 DIAGNOSIS — Z12.5 SCREENING FOR PROSTATE CANCER: ICD-10-CM

## 2019-11-11 DIAGNOSIS — I10 ESSENTIAL HYPERTENSION: ICD-10-CM

## 2019-11-11 DIAGNOSIS — R97.20 ELEVATED PSA: Primary | ICD-10-CM

## 2019-11-11 DIAGNOSIS — E78.2 MIXED HYPERLIPIDEMIA: ICD-10-CM

## 2019-11-11 LAB
ALBUMIN SERPL BCP-MCNC: 3.8 G/DL (ref 3.5–5)
ALP SERPL-CCNC: 59 U/L (ref 46–116)
ALT SERPL W P-5'-P-CCNC: 19 U/L (ref 12–78)
ANION GAP SERPL CALCULATED.3IONS-SCNC: 6 MMOL/L (ref 4–13)
AST SERPL W P-5'-P-CCNC: 21 U/L (ref 5–45)
BILIRUB SERPL-MCNC: 0.72 MG/DL (ref 0.2–1)
BUN SERPL-MCNC: 11 MG/DL (ref 5–25)
CALCIUM SERPL-MCNC: 9.1 MG/DL (ref 8.3–10.1)
CHLORIDE SERPL-SCNC: 108 MMOL/L (ref 100–108)
CHOLEST SERPL-MCNC: 182 MG/DL (ref 50–200)
CO2 SERPL-SCNC: 27 MMOL/L (ref 21–32)
CREAT SERPL-MCNC: 1.03 MG/DL (ref 0.6–1.3)
ERYTHROCYTE [DISTWIDTH] IN BLOOD BY AUTOMATED COUNT: 12.8 % (ref 11.6–15.1)
GFR SERPL CREATININE-BSD FRML MDRD: 73 ML/MIN/1.73SQ M
GLUCOSE P FAST SERPL-MCNC: 101 MG/DL (ref 65–99)
HCT VFR BLD AUTO: 47.4 % (ref 36.5–49.3)
HDLC SERPL-MCNC: 75 MG/DL
HGB BLD-MCNC: 15.6 G/DL (ref 12–17)
LDLC SERPL CALC-MCNC: 97 MG/DL (ref 0–100)
MCH RBC QN AUTO: 29.3 PG (ref 26.8–34.3)
MCHC RBC AUTO-ENTMCNC: 32.9 G/DL (ref 31.4–37.4)
MCV RBC AUTO: 89 FL (ref 82–98)
PLATELET # BLD AUTO: 181 THOUSANDS/UL (ref 149–390)
PMV BLD AUTO: 10.4 FL (ref 8.9–12.7)
POTASSIUM SERPL-SCNC: 4.6 MMOL/L (ref 3.5–5.3)
PROT SERPL-MCNC: 6.9 G/DL (ref 6.4–8.2)
PSA SERPL-MCNC: 2 NG/ML (ref 0–4)
RBC # BLD AUTO: 5.32 MILLION/UL (ref 3.88–5.62)
SODIUM SERPL-SCNC: 141 MMOL/L (ref 136–145)
TRIGL SERPL-MCNC: 50 MG/DL
TSH SERPL DL<=0.05 MIU/L-ACNC: 1.46 UIU/ML (ref 0.36–3.74)
WBC # BLD AUTO: 4.9 THOUSAND/UL (ref 4.31–10.16)

## 2019-11-11 PROCEDURE — G0103 PSA SCREENING: HCPCS

## 2019-11-11 PROCEDURE — 80061 LIPID PANEL: CPT

## 2019-11-11 PROCEDURE — 85027 COMPLETE CBC AUTOMATED: CPT

## 2019-11-11 PROCEDURE — 36415 COLL VENOUS BLD VENIPUNCTURE: CPT

## 2019-11-11 PROCEDURE — 80053 COMPREHEN METABOLIC PANEL: CPT

## 2019-11-11 PROCEDURE — 84443 ASSAY THYROID STIM HORMONE: CPT

## 2019-11-11 NOTE — PROGRESS NOTES
Discussed labs with pt  psa although normal is much higher then previous measurement  Will refer to urology for eval

## 2019-11-11 NOTE — TELEPHONE ENCOUNTER
TO BE COMPLETED BY CENTRAL AUTH TEAM:     Physician: DR Veronica Judge    Medication: SYNVISC-3    Number of Injections in Series (Appointments scheduled 1 week apart from one another): 3    Schedule after this date: 11/14/19    Billing Info: Buy and Bill/Specialty Pharmacy-Patient Supply>> BUY&BILL    Appointment Message Line: LEFT KNEE SYNVISC #1,2,3 BUY&BILL  (please copy and paste appointment message line when scheduling appointment)    Additional Comments:

## 2019-11-18 ENCOUNTER — HOSPITAL ENCOUNTER (OUTPATIENT)
Dept: MAMMOGRAPHY | Facility: CLINIC | Age: 69
End: 2019-11-18
Payer: COMMERCIAL

## 2019-11-18 ENCOUNTER — HOSPITAL ENCOUNTER (OUTPATIENT)
Dept: ULTRASOUND IMAGING | Facility: CLINIC | Age: 69
Discharge: HOME | End: 2019-11-18
Payer: COMMERCIAL

## 2019-11-18 ENCOUNTER — HOSPITAL ENCOUNTER (OUTPATIENT)
Dept: MAMMOGRAPHY | Facility: CLINIC | Age: 69
Discharge: HOME | End: 2019-11-18
Payer: COMMERCIAL

## 2019-11-18 VITALS — BODY MASS INDEX: 27.94 KG/M2 | HEIGHT: 61 IN | WEIGHT: 148 LBS

## 2019-11-18 DIAGNOSIS — N64.4 BREAST PAIN IN MALE: ICD-10-CM

## 2019-11-18 PROCEDURE — 77066 DX MAMMO INCL CAD BI: CPT

## 2019-11-21 ENCOUNTER — CLINICAL SUPPORT (OUTPATIENT)
Dept: FAMILY MEDICINE CLINIC | Facility: CLINIC | Age: 69
End: 2019-11-21

## 2019-11-21 VITALS — DIASTOLIC BLOOD PRESSURE: 72 MMHG | SYSTOLIC BLOOD PRESSURE: 132 MMHG

## 2019-11-21 DIAGNOSIS — I10 ESSENTIAL HYPERTENSION: Primary | ICD-10-CM

## 2019-11-21 DIAGNOSIS — G44.001 INTRACTABLE CLUSTER HEADACHE SYNDROME, UNSPECIFIED CHRONICITY PATTERN: Primary | ICD-10-CM

## 2019-11-21 PROCEDURE — RECHECK

## 2019-11-21 RX ORDER — RIZATRIPTAN BENZOATE 10 MG/1
10 TABLET ORAL ONCE AS NEEDED
Qty: 9 TABLET | Refills: 3 | Status: SHIPPED | OUTPATIENT
Start: 2019-11-21 | End: 2020-04-14

## 2019-11-21 NOTE — PROGRESS NOTES
Assessment/Plan:    No problem-specific Assessment & Plan notes found for this encounter  Diagnoses and all orders for this visit:    Essential hypertension          Subjective:      Patient ID: Aric Crisostomo is a 68 y o  male  HPI        Review of Systems      Objective:      /72 (BP Location: Left arm, Patient Position: Sitting, Cuff Size: Standard)      Dr Nani Reddy stated to PT very good and went and gave him a overview on [facility]       He also scheduled an appt with Dr Rainey Siemens

## 2019-11-25 ENCOUNTER — OFFICE VISIT (OUTPATIENT)
Dept: CARDIOLOGY CLINIC | Facility: CLINIC | Age: 69
End: 2019-11-25
Payer: COMMERCIAL

## 2019-11-25 VITALS
SYSTOLIC BLOOD PRESSURE: 135 MMHG | DIASTOLIC BLOOD PRESSURE: 80 MMHG | RESPIRATION RATE: 18 BRPM | HEIGHT: 61 IN | HEART RATE: 65 BPM | WEIGHT: 154.4 LBS | BODY MASS INDEX: 29.15 KG/M2

## 2019-11-25 DIAGNOSIS — R07.9 CHEST PAIN, UNSPECIFIED TYPE: Primary | ICD-10-CM

## 2019-11-25 DIAGNOSIS — I10 ESSENTIAL HYPERTENSION: ICD-10-CM

## 2019-11-25 DIAGNOSIS — E78.2 MIXED HYPERLIPIDEMIA: ICD-10-CM

## 2019-11-25 PROCEDURE — 99214 OFFICE O/P EST MOD 30 MIN: CPT | Performed by: INTERNAL MEDICINE

## 2019-11-25 PROCEDURE — 93000 ELECTROCARDIOGRAM COMPLETE: CPT | Performed by: INTERNAL MEDICINE

## 2019-11-25 NOTE — PROGRESS NOTES
Assessment/Plan:    Essential hypertension  Hypertension, stable and adequately controlled  Mixed hyperlipidemia  Hyperlipidemia, stable  The patient continue atorvastatin 10 daily    Chest pain  Atypical chest pain raising possibility musculoskeletal discomfort  There is an exertional component to this symptom  The patient has multiple risk factors including hypertension hyperlipidemia as well as family history  I will therefore arrange for the patient to nuclear stress test and echocardiogram        Diagnoses and all orders for this visit:    Chest pain, unspecified type  -     POCT ECG  -     Echo complete with contrast if indicated; Future  -     NM myocardial perfusion spect (stress and/or rest); Future    Mixed hyperlipidemia  -     Echo complete with contrast if indicated; Future  -     NM myocardial perfusion spect (stress and/or rest); Future    Essential hypertension  -     Echo complete with contrast if indicated; Future  -     NM myocardial perfusion spect (stress and/or rest); Future          Subjective:  Chest pain  Patient ID: Rosalee Prince is a 68 y o  male  Patient presented to this office for the purpose of cardiac follow-up  He was apparently doing his usual bike exercise when he developed left-sided chest pain  He usually experiences such discomfort while he is exercising but it disappears spontaneously and quickly  This time it lasted several minutes and the patient decided to change the type of exercise from the bike to the treadmill  He was able to continue his exercise on the treadmill without experiencing any further chest pain  Patient has a history of atypical chest pain in the past as well as hypertension and hyperlipidemia  He has a history of migraine headaches and Roberts's esophagus he denies any symptoms of palpitation, dizziness or lightheadedness  He has no leg edema        The following portions of the patient's history were reviewed and updated as appropriate: allergies, current medications, past family history, past medical history, past social history, past surgical history and problem list     Review of Systems   Respiratory: Negative for apnea, cough, chest tightness, shortness of breath and wheezing  Cardiovascular: Positive for chest pain  Negative for palpitations and leg swelling  Gastrointestinal: Negative for abdominal pain  Neurological: Negative for dizziness and light-headedness  Objective:  Stable cardiac-wise      /80 (BP Location: Right arm, Patient Position: Sitting)   Pulse 65   Resp 18   Ht 5' 1" (1 549 m)   Wt 70 kg (154 lb 6 4 oz) Comment: with jacket and shoes  BMI 29 17 kg/m²          Physical Exam   Constitutional: He is oriented to person, place, and time  He appears well-developed and well-nourished  No distress  HENT:   Head: Normocephalic  Eyes: Pupils are equal, round, and reactive to light  Neck: Normal range of motion  No JVD present  No thyromegaly present  Cardiovascular: Normal rate, regular rhythm, S1 normal and S2 normal  Exam reveals no gallop and no friction rub  Murmur heard  Crescendo systolic murmur is present with a grade of 1/6  Pulmonary/Chest: Effort normal and breath sounds normal  No respiratory distress  He has no wheezes  He has no rales  He exhibits no tenderness  Abdominal: Soft  Musculoskeletal: Normal range of motion  He exhibits no edema, tenderness or deformity  Neurological: He is alert and oriented to person, place, and time  Skin: Skin is warm and dry  He is not diaphoretic  Psychiatric: He has a normal mood and affect  Vitals reviewed

## 2019-11-25 NOTE — ASSESSMENT & PLAN NOTE
Atypical chest pain raising possibility musculoskeletal discomfort  There is an exertional component to this symptom  The patient has multiple risk factors including hypertension hyperlipidemia as well as family history    I will therefore arrange for the patient to nuclear stress test and echocardiogram

## 2019-11-25 NOTE — LETTER
November 25, 2019     Izzy Cope, [address] [facility]  [address]  [address] [address]    Patient: Lin Gallegos   YOB: 1950   Date of Visit: 11/25/2019       Dear Dr Thu Potter:    Thank you for referring Lin Gallegos to me for evaluation  Below are my notes for this consultation  If you have questions, please do not hesitate to call me  I look forward to following your patient along with you  Sincerely,        Nora Larry MD        CC: No Recipients  Nora Larry MD  11/25/2019 11:02 AM  Sign at close encounter  Assessment/Plan:    Essential hypertension  Hypertension, stable and adequately controlled  Mixed hyperlipidemia  Hyperlipidemia, stable  The patient continue atorvastatin 10 daily    Chest pain  Atypical chest pain raising possibility musculoskeletal discomfort  There is an exertional component to this symptom  The patient has multiple risk factors including hypertension hyperlipidemia as well as family history  I will therefore arrange for the patient to nuclear stress test and echocardiogram        Diagnoses and all orders for this visit:    Chest pain, unspecified type  -     POCT ECG  -     Echo complete with contrast if indicated; Future  -     NM myocardial perfusion spect (stress and/or rest); Future    Mixed hyperlipidemia  -     Echo complete with contrast if indicated; Future  -     NM myocardial perfusion spect (stress and/or rest); Future    Essential hypertension  -     Echo complete with contrast if indicated; Future  -     NM myocardial perfusion spect (stress and/or rest); Future          Subjective:  Chest pain  Patient ID: Lin Gallegos is a 68 y o  male  Patient presented to this office for the purpose of cardiac follow-up  He was apparently doing his usual bike exercise when he developed left-sided chest pain  He usually experiences such discomfort while he is exercising but it disappears spontaneously and quickly    This time it lasted several minutes and the patient decided to change the type of exercise from the bike to the treadmill  He was able to continue his exercise on the treadmill without experiencing any further chest pain  Patient has a history of atypical chest pain in the past as well as hypertension and hyperlipidemia  He has a history of migraine headaches and Roberts's esophagus he denies any symptoms of palpitation, dizziness or lightheadedness  He has no leg edema  The following portions of the patient's history were reviewed and updated as appropriate: allergies, current medications, past family history, past medical history, past social history, past surgical history and problem list     Review of Systems   Respiratory: Negative for apnea, cough, chest tightness, shortness of breath and wheezing  Cardiovascular: Positive for chest pain  Negative for palpitations and leg swelling  Gastrointestinal: Negative for abdominal pain  Neurological: Negative for dizziness and light-headedness  Objective:  Stable cardiac-wise      /80 (BP Location: Right arm, Patient Position: Sitting)   Pulse 65   Resp 18   Ht 5' 1" (1 549 m)   Wt 70 kg (154 lb 6 4 oz) Comment: with jacket and shoes  BMI 29 17 kg/m²           Physical Exam   Constitutional: He is oriented to person, place, and time  He appears well-developed and well-nourished  No distress  HENT:   Head: Normocephalic  Eyes: Pupils are equal, round, and reactive to light  Neck: Normal range of motion  No JVD present  No thyromegaly present  Cardiovascular: Normal rate, regular rhythm, S1 normal and S2 normal  Exam reveals no gallop and no friction rub  Murmur heard  Crescendo systolic murmur is present with a grade of 1/6  Pulmonary/Chest: Effort normal and breath sounds normal  No respiratory distress  He has no wheezes  He has no rales  He exhibits no tenderness  Abdominal: Soft  Musculoskeletal: Normal range of motion   He exhibits no edema, tenderness or deformity  Neurological: He is alert and oriented to person, place, and time  Skin: Skin is warm and dry  He is not diaphoretic  Psychiatric: He has a normal mood and affect  Vitals reviewed

## 2019-11-25 NOTE — PATIENT INSTRUCTIONS
The patient will be scheduled for nuclear stress test and echocardiogram   He will continue present medications

## 2019-11-26 ENCOUNTER — OFFICE VISIT (OUTPATIENT)
Dept: FAMILY MEDICINE CLINIC | Facility: CLINIC | Age: 69
End: 2019-11-26
Payer: COMMERCIAL

## 2019-11-26 VITALS
HEIGHT: 61 IN | WEIGHT: 152.2 LBS | RESPIRATION RATE: 13 BRPM | OXYGEN SATURATION: 97 % | SYSTOLIC BLOOD PRESSURE: 144 MMHG | TEMPERATURE: 98.2 F | HEART RATE: 68 BPM | BODY MASS INDEX: 28.74 KG/M2 | DIASTOLIC BLOOD PRESSURE: 80 MMHG

## 2019-11-26 DIAGNOSIS — F32.A MILD DEPRESSION: Primary | ICD-10-CM

## 2019-11-26 PROCEDURE — 3725F SCREEN DEPRESSION PERFORMED: CPT | Performed by: FAMILY MEDICINE

## 2019-11-26 PROCEDURE — 99213 OFFICE O/P EST LOW 20 MIN: CPT | Performed by: FAMILY MEDICINE

## 2019-11-26 RX ORDER — ESCITALOPRAM OXALATE 10 MG/1
10 TABLET ORAL DAILY
Qty: 30 TABLET | Refills: 5 | Status: SHIPPED | OUTPATIENT
Start: 2019-11-26 | End: 2020-01-13 | Stop reason: ALTCHOICE

## 2019-11-26 NOTE — PROGRESS NOTES
Assessment/Plan:    1  Mild depression ([facility] [address] )  Assessment & Plan:  Discussed medication  Will start low dose lexapro  Not interested in therapy right now    Orders:  -     escitalopram (LEXAPRO) 10 mg tablet; Take 1 tablet (10 mg total) by mouth daily      BMI Counseling: Body mass index is 28 76 kg/m²  Discussed the patient's BMI with him  The BMI is above normal  Nutrition recommendations include reducing portion sizes and 3-5 servings of fruits/vegetables daily  Exercise recommendations include exercising 3-5 times per week  There are no Patient Instructions on file for this visit  Return in about 6 weeks (around 1/7/2020)  Subjective:      Patient ID: Valere Mortimer is a 68 y o  male  Chief Complaint   Patient presents with    Depression     Patient here to discuss possible depression medication        Here for follow up  Seeing cardiologist for recent chest pain- has stress test  Often feels down  Feels oversensitive  Days that doesn't want to wake up  Loves his job      The following portions of the patient's history were reviewed and updated as appropriate: allergies, current medications, past family history, past medical history, past social history, past surgical history and problem list     Review of Systems   Constitutional: Negative  HENT: Negative  Eyes: Negative  Respiratory: Negative  Cardiovascular: Negative  Gastrointestinal: Negative  Endocrine: Negative  Genitourinary: Negative  Musculoskeletal: Negative  Skin: Negative  Allergic/Immunologic: Negative  Neurological: Negative  Hematological: Negative  Psychiatric/Behavioral: Positive for dysphoric mood           Current Outpatient Medications   Medication Sig Dispense Refill    aspirin 81 MG tablet Take by mouth      atorvastatin (LIPITOR) 10 mg tablet TAKE 1 TABLET BY MOUTH EVERY DAY 90 tablet 3    COMBIGAN 0 2-0 5 %       esomeprazole (NexIUM) 20 mg capsule Take 20 mg by mouth every morning before breakfast      fluticasone (FLONASE) 50 mcg/act nasal spray 1 spray into each nostril as needed       latanoprost (XALATAN) 0 005 % ophthalmic solution Administer 1 drop to both eyes       levothyroxine 50 mcg tablet TAKE 1 TABLET BY MOUTH EVERY DAY ON AN EMPTY STOMACH 90 tablet 3    losartan (COZAAR) 100 MG tablet Take 1 tablet (100 mg total) by mouth daily 90 tablet 3    rizatriptan (MAXALT) 10 MG tablet Take 1 tablet (10 mg total) by mouth once as needed (PT takes this when having migraines) for up to 1 dose 9 tablet 3    escitalopram (LEXAPRO) 10 mg tablet Take 1 tablet (10 mg total) by mouth daily 30 tablet 5     No current facility-administered medications for this visit  Objective:    /80   Pulse 68   Temp 98 2 °F (36 8 °C)   Resp 13   Ht 5' 1" (1 549 m)   Wt 69 kg (152 lb 3 2 oz)   SpO2 97%   BMI 28 76 kg/m²        Physical Exam   Constitutional: He appears well-developed and well-nourished  HENT:   Head: Normocephalic and atraumatic  Eyes: Pupils are equal, round, and reactive to light  Neck: Normal range of motion  Cardiovascular: Normal rate, regular rhythm, normal heart sounds and intact distal pulses  Pulmonary/Chest: Effort normal and breath sounds normal    Abdominal: Soft  Bowel sounds are normal    Musculoskeletal: Normal range of motion  Neurological: He is alert  Skin: Skin is warm  Capillary refill takes less than 2 seconds  Nursing note and vitals reviewed               Luisa Zacarias DO

## 2019-12-05 ENCOUNTER — OFFICE VISIT (OUTPATIENT)
Dept: OBGYN CLINIC | Facility: HOSPITAL | Age: 69
End: 2019-12-05
Payer: COMMERCIAL

## 2019-12-05 ENCOUNTER — HOSPITAL ENCOUNTER (OUTPATIENT)
Dept: RADIOLOGY | Facility: HOSPITAL | Age: 69
Discharge: HOME | End: 2019-12-05
Attending: ORTHOPAEDIC SURGERY
Payer: COMMERCIAL

## 2019-12-05 VITALS — HEART RATE: 76 BPM | SYSTOLIC BLOOD PRESSURE: 150 MMHG | DIASTOLIC BLOOD PRESSURE: 78 MMHG

## 2019-12-05 DIAGNOSIS — M25.562 CHRONIC PAIN OF LEFT KNEE: ICD-10-CM

## 2019-12-05 DIAGNOSIS — G89.29 CHRONIC PAIN OF LEFT KNEE: ICD-10-CM

## 2019-12-05 DIAGNOSIS — M17.12 PRIMARY OSTEOARTHRITIS OF LEFT KNEE: ICD-10-CM

## 2019-12-05 DIAGNOSIS — M17.12 PRIMARY OSTEOARTHRITIS OF LEFT KNEE: Primary | ICD-10-CM

## 2019-12-05 PROCEDURE — 20610 DRAIN/INJ JOINT/BURSA W/O US: CPT | Performed by: ORTHOPAEDIC SURGERY

## 2019-12-05 PROCEDURE — 73562 X-RAY EXAM OF KNEE 3: CPT

## 2019-12-05 NOTE — PROGRESS NOTES
Assessment:   Diagnosis ICD-10-CM Associated Orders   1  Primary osteoarthritis of left knee M17 12 Large joint arthrocentesis: L knee     XR knee 3 vw left non injury   2  Chronic pain of left knee M25 562 Large joint arthrocentesis: L knee    G89 29 XR knee 3 vw left non injury       Plan:  - 1st Synvisc injection administered today  - Instructed to ice  - Follow up in 1 week    To do next visit:  Return in about 1 week (around 12/12/2019) for Recheck Synvisc #2 Left knee  The above stated was discussed in layman's terms and the patient expressed understanding  All questions were answered to the patient's satisfaction  Scribe Attestation    I,:   Graeme Craven am acting as a scribe while in the presence of the attending physician :        I,:   Titi Schultz MD personally performed the services described in this documentation    as scribed in my presence :              Subjective:   Lewis Mcdonald is a 68 y o  male who presents today for his left knee pain  He has been treating conservatively for his left knee osteoarthritis with periodic cortisone injections  He is here today to start the first Synvisc injection of a 3 series  He notes that most of his pain is when he climbs stairs and with flexion activities         Review of systems negative unless otherwise specified in HPI    Past Medical History:   Diagnosis Date    Acute non-recurrent maxillary sinusitis 4/15/2019    Angina effort     Anxiety     Roberts's esophagus     Bleeding from left ear 4/26/2019    Chest pain, unspecified     Cough 2/13/2018    GERD (gastroesophageal reflux disease)     Hyperlipidemia     Hypertension     Impacted cerumen of right ear 4/25/2019    Migraine     Otorrhea, left 4/25/2019    SBO (small bowel obstruction) ([facility] [address] ) 3/23/2019       Past Surgical History:   Procedure Laterality Date    CATARACT EXTRACTION      EYE SURGERY      HERNIA REPAIR Right 2014       Family History   Problem Relation Age of Onset    No Known Problems Mother     Heart attack Father     Breast cancer Paternal Aunt        Social History     Occupational History    Not on file   Tobacco Use    Smoking status: Former Smoker     Packs/day: 0 25     Years: 4 00     Pack years: 1 00     Types: Cigarettes     Last attempt to quit: Emily Allyssaks     Years since quittin 9    Smokeless tobacco: Never Used   Substance and Sexual Activity    Alcohol use: Yes     Frequency: 2-3 times a week     Drinks per session: 1 or 2     Binge frequency: Never    Drug use: Never    Sexual activity: Not on file         Current Outpatient Medications:     aspirin 81 MG tablet, Take by mouth, Disp: , Rfl:     atorvastatin (LIPITOR) 10 mg tablet, TAKE 1 TABLET BY MOUTH EVERY DAY, Disp: 90 tablet, Rfl: 3    COMBIGAN 0 2-0 5 %, , Disp: , Rfl:     escitalopram (LEXAPRO) 10 mg tablet, Take 1 tablet (10 mg total) by mouth daily, Disp: 30 tablet, Rfl: 5    esomeprazole (NexIUM) 20 mg capsule, Take 20 mg by mouth every morning before breakfast, Disp: , Rfl:     fluticasone (FLONASE) 50 mcg/act nasal spray, 1 spray into each nostril as needed , Disp: , Rfl:     latanoprost (XALATAN) 0 005 % ophthalmic solution, Administer 1 drop to both eyes , Disp: , Rfl:     levothyroxine 50 mcg tablet, TAKE 1 TABLET BY MOUTH EVERY DAY ON AN EMPTY STOMACH, Disp: 90 tablet, Rfl: 3    losartan (COZAAR) 100 MG tablet, Take 1 tablet (100 mg total) by mouth daily, Disp: 90 tablet, Rfl: 3    rizatriptan (MAXALT) 10 MG tablet, Take 1 tablet (10 mg total) by mouth once as needed (PT takes this when having migraines) for up to 1 dose, Disp: 9 tablet, Rfl: 3    Allergies   Allergen Reactions    Influenza Vaccines Other (See Comments)     Flu like symptoms     Nitrous Oxide             Vitals:    19 1446   BP: 150/78   Pulse: 76       Objective:                    Left Knee Exam     Muscle Strength   The patient has normal left knee strength      Tenderness   The patient is experiencing tenderness in the patella and medial joint line  Range of Motion   The patient has normal left knee ROM  Other   Erythema: absent  Sensation: normal  Pulse: present  Swelling: none  Effusion: no effusion present    Comments:  Test is stable on exam  Calf is non-tender and soft  Normal hip ROM and strength          Diagnostics, reviewed and taken today if performed as documented:    Xrays of the left knee was performed today and reviewed: no increased changes compared to last set of xrays  Uniformly narrowed both medial and lateral compartments  The attending physician has personally reviewed the pertinent films in PACS and interpretation is as follows:      Procedures, if performed today:    Large joint arthrocentesis: L knee  Date/Time: 12/5/2019 3:08 PM  Consent given by: patient  Site marked: site marked  Timeout: Immediately prior to procedure a time out was called to verify the correct patient, procedure, equipment, support staff and site/side marked as required   Supporting Documentation  Indications: pain   Procedure Details  Location: knee - L knee  Preparation: Patient was prepped and draped in the usual sterile fashion  Needle size: 22 G  Ultrasound guidance: no  Approach: anterolateral  Medications administered: 16 mg hylan 16 MG/2ML    Patient tolerance: patient tolerated the procedure well with no immediate complications  Dressing:  Sterile dressing applied          Portions of the record may have been created with voice recognition software  Occasional wrong word or "sound a like" substitutions may have occurred due to the inherent limitations of voice recognition software  Read the chart carefully and recognize, using context, where substitutions have occurred

## 2019-12-12 ENCOUNTER — OFFICE VISIT (OUTPATIENT)
Dept: OBGYN CLINIC | Facility: HOSPITAL | Age: 69
End: 2019-12-12
Payer: COMMERCIAL

## 2019-12-12 VITALS — SYSTOLIC BLOOD PRESSURE: 135 MMHG | HEART RATE: 72 BPM | DIASTOLIC BLOOD PRESSURE: 80 MMHG

## 2019-12-12 DIAGNOSIS — M17.12 PRIMARY OSTEOARTHRITIS OF LEFT KNEE: Primary | ICD-10-CM

## 2019-12-12 PROCEDURE — 20610 DRAIN/INJ JOINT/BURSA W/O US: CPT | Performed by: ORTHOPAEDIC SURGERY

## 2019-12-12 NOTE — PROGRESS NOTES
Patient Name:  Ayaan Nick  MRN:  [id number]    Assessment & Plan     Left knee osteoarthritis   1   2nd of 3rd left knee Synvisc injection was performed in the office today, without complication  2  Follow up in 1 week for 3rd Synvisc injection into the left knee       Large joint arthrocentesis: L knee  Date/Time: 2019 9:35 AM  Consent given by: patient  Site marked: site marked  Timeout: Immediately prior to procedure a time out was called to verify the correct patient, procedure, equipment, support staff and site/side marked as required   Supporting Documentation  Indications: pain   Procedure Details  Location: knee - L knee  Preparation: Patient was prepped and draped in the usual sterile fashion  Needle size: 22 G  Ultrasound guidance: no  Medications administered: 16 mg hylan 16 MG/2ML    Patient tolerance: patient tolerated the procedure well with no immediate complications  Dressing:  Sterile dressing applied        Subjective     68 y o  male presents to the office today for his 2nd or 3rd left knee Synvisc injection  Shena Castaneda was seen in the office last week where he underwent his 1st Synvisc injection  He denies any improvement following the fist injection at this time  General ROS:  Negative for fever or chills  Neurological ROS:  Negative for numbness or tingling  Objective     /80   Pulse 72     Left knee:  Ambulates without assistance   No erythema  No ecchymosis   No edema  Full ROM   Full strength   Sensation intact to light touch   Skin warm and well perfused   Psychiatric: Mood and affect are appropriate    Data Review     I have personally reviewed pertinent films in PACS, and my interpretation follows      No new imaging to review     Social History     Tobacco Use    Smoking status: Former Smoker     Packs/day: 0 25     Years: 4 00     Pack years: 1 00     Types: Cigarettes     Last attempt to quit:      Years since quittin 9    Smokeless tobacco: Never Used   Substance Use Topics    Alcohol use: Yes     Frequency: 2-3 times a week     Drinks per session: 1 or 2     Binge frequency: Never    Drug use: Never       Scribe Attestation    I,:   Michele Howe am acting as a scribe while in the presence of the attending physician :        I,:   Duane Henri, MD personally performed the services described in this documentation    as scribed in my presence :

## 2019-12-19 ENCOUNTER — OFFICE VISIT (OUTPATIENT)
Dept: OBGYN CLINIC | Facility: HOSPITAL | Age: 69
End: 2019-12-19
Payer: COMMERCIAL

## 2019-12-19 VITALS
SYSTOLIC BLOOD PRESSURE: 148 MMHG | WEIGHT: 152 LBS | BODY MASS INDEX: 28.7 KG/M2 | HEART RATE: 69 BPM | DIASTOLIC BLOOD PRESSURE: 81 MMHG | HEIGHT: 61 IN

## 2019-12-19 DIAGNOSIS — M25.562 CHRONIC PAIN OF LEFT KNEE: ICD-10-CM

## 2019-12-19 DIAGNOSIS — M17.12 PRIMARY OSTEOARTHRITIS OF LEFT KNEE: Primary | ICD-10-CM

## 2019-12-19 DIAGNOSIS — G89.29 CHRONIC PAIN OF LEFT KNEE: ICD-10-CM

## 2019-12-19 PROCEDURE — 20610 DRAIN/INJ JOINT/BURSA W/O US: CPT | Performed by: ORTHOPAEDIC SURGERY

## 2019-12-19 NOTE — PROGRESS NOTES
Assessment:   Diagnosis ICD-10-CM Associated Orders   1  Primary osteoarthritis of left knee M17 12    2  Chronic pain of left knee M25 562     G89 29        Plan:    - The 3rd Synvisc injection was administered today for the left knee osteoarthritis  - Instructed to ice the knee   - Recommends any OTC analgesics as needed    To do next visit:  Return in about 6 weeks (around 1/30/2020) for Recheck for the left knee  The above stated was discussed in layman's terms and the patient expressed understanding  All questions were answered to the patient's satisfaction  Scribe Attestation    I,:   Vinnie Desir am acting as a scribe while in the presence of the attending physician :        I,:   Charlene Ingram MD personally performed the services described in this documentation    as scribed in my presence :              Subjective:   Satnam Cornell is a 68 y o  male who presents today for his 3rd Synvisc injection of a series of 3 for the left knee osteoarthritis  He notes that he has seen some mild improvement in the knee already  His last set of bilateral cortisone injections were administered 9/19/19  He has started taking a Tumeric capsule about 1 week ago         Review of systems negative unless otherwise specified in HPI    Past Medical History:   Diagnosis Date    Acute non-recurrent maxillary sinusitis 4/15/2019    Angina effort     Anxiety     Roberts's esophagus     Bleeding from left ear 4/26/2019    Chest pain, unspecified     Cough 2/13/2018    GERD (gastroesophageal reflux disease)     Hyperlipidemia     Hypertension     Impacted cerumen of right ear 4/25/2019    Migraine     Otorrhea, left 4/25/2019    SBO (small bowel obstruction) ([facility] [address] ) 3/23/2019       Past Surgical History:   Procedure Laterality Date    CATARACT EXTRACTION      EYE SURGERY      HERNIA REPAIR Right 2014       Family History   Problem Relation Age of Onset    No Known Problems Mother     Heart attack Father     Breast cancer Paternal Aunt        Social History     Occupational History    Not on file   Tobacco Use    Smoking status: Former Smoker     Packs/day: 0 25     Years: 4 00     Pack years: 1 00     Types: Cigarettes     Last attempt to quit: Myah Laws     Years since quittin 9    Smokeless tobacco: Never Used   Substance and Sexual Activity    Alcohol use: Yes     Frequency: 2-3 times a week     Drinks per session: 1 or 2     Binge frequency: Never    Drug use: Never    Sexual activity: Not on file         Current Outpatient Medications:     TURMERIC PO, Take by mouth, Disp: , Rfl:     aspirin 81 MG tablet, Take by mouth, Disp: , Rfl:     atorvastatin (LIPITOR) 10 mg tablet, TAKE 1 TABLET BY MOUTH EVERY DAY, Disp: 90 tablet, Rfl: 3    COMBIGAN 0 2-0 5 %, , Disp: , Rfl:     escitalopram (LEXAPRO) 10 mg tablet, Take 1 tablet (10 mg total) by mouth daily, Disp: 30 tablet, Rfl: 5    esomeprazole (NexIUM) 20 mg capsule, Take 20 mg by mouth every morning before breakfast, Disp: , Rfl:     fluticasone (FLONASE) 50 mcg/act nasal spray, 1 spray into each nostril as needed , Disp: , Rfl:     latanoprost (XALATAN) 0 005 % ophthalmic solution, Administer 1 drop to both eyes , Disp: , Rfl:     levothyroxine 50 mcg tablet, TAKE 1 TABLET BY MOUTH EVERY DAY ON AN EMPTY STOMACH, Disp: 90 tablet, Rfl: 3    losartan (COZAAR) 100 MG tablet, Take 1 tablet (100 mg total) by mouth daily, Disp: 90 tablet, Rfl: 3    rizatriptan (MAXALT) 10 MG tablet, Take 1 tablet (10 mg total) by mouth once as needed (PT takes this when having migraines) for up to 1 dose, Disp: 9 tablet, Rfl: 3    Allergies   Allergen Reactions    Influenza Vaccines Other (See Comments)     Flu like symptoms     Nitrous Oxide             Vitals:    19 0906   BP: 148/81   Pulse: 69       Objective:                    Left Knee Exam     Tenderness   The patient is experiencing tenderness in the lateral joint line and patella      Range of Motion   Extension: 0   Flexion: 120 (with patella crepitus)     Other   Erythema: absent  Sensation: normal  Pulse: present  Swelling: none  Effusion: no effusion present    Comments:  Calf is soft and non-tender            Diagnostics, reviewed and taken today if performed as documented:    None performed      The attending physician has personally reviewed the pertinent films in PACS and interpretation is as follows:      Procedures, if performed today:    Large joint arthrocentesis: L knee  Date/Time: 12/19/2019 9:14 AM  Consent given by: patient  Site marked: site marked  Timeout: Immediately prior to procedure a time out was called to verify the correct patient, procedure, equipment, support staff and site/side marked as required   Supporting Documentation  Indications: pain   Procedure Details  Location: knee - L knee  Preparation: Patient was prepped and draped in the usual sterile fashion  Needle size: 22 G  Ultrasound guidance: no  Approach: anterolateral  Medications administered: 16 mg hylan 16 MG/2ML    Patient tolerance: patient tolerated the procedure well with no immediate complications  Dressing:  Sterile dressing applied          Portions of the record may have been created with voice recognition software  Occasional wrong word or "sound a like" substitutions may have occurred due to the inherent limitations of voice recognition software  Read the chart carefully and recognize, using context, where substitutions have occurred

## 2020-01-10 ENCOUNTER — HOSPITAL ENCOUNTER (OUTPATIENT)
Dept: NON INVASIVE DIAGNOSTICS | Facility: CLINIC | Age: 70
Discharge: HOME | End: 2020-01-10
Payer: MEDICARE

## 2020-01-10 DIAGNOSIS — E78.2 MIXED HYPERLIPIDEMIA: ICD-10-CM

## 2020-01-10 DIAGNOSIS — I10 ESSENTIAL HYPERTENSION: ICD-10-CM

## 2020-01-10 DIAGNOSIS — R07.9 CHEST PAIN, UNSPECIFIED TYPE: ICD-10-CM

## 2020-01-10 PROCEDURE — 93306 TTE W/DOPPLER COMPLETE: CPT

## 2020-01-10 PROCEDURE — 93017 CV STRESS TEST TRACING ONLY: CPT

## 2020-01-10 PROCEDURE — 93306 TTE W/DOPPLER COMPLETE: CPT | Performed by: INTERNAL MEDICINE

## 2020-01-10 PROCEDURE — 93018 CV STRESS TEST I&R ONLY: CPT | Performed by: INTERNAL MEDICINE

## 2020-01-10 PROCEDURE — 93016 CV STRESS TEST SUPVJ ONLY: CPT | Performed by: INTERNAL MEDICINE

## 2020-01-10 PROCEDURE — A9502 TC99M TETROFOSMIN: HCPCS

## 2020-01-10 PROCEDURE — 78452 HT MUSCLE IMAGE SPECT MULT: CPT | Performed by: INTERNAL MEDICINE

## 2020-01-10 PROCEDURE — 78452 HT MUSCLE IMAGE SPECT MULT: CPT

## 2020-01-13 ENCOUNTER — OFFICE VISIT (OUTPATIENT)
Dept: FAMILY MEDICINE CLINIC | Facility: CLINIC | Age: 70
End: 2020-01-13
Payer: MEDICARE

## 2020-01-13 VITALS
BODY MASS INDEX: 28.7 KG/M2 | DIASTOLIC BLOOD PRESSURE: 80 MMHG | HEIGHT: 61 IN | SYSTOLIC BLOOD PRESSURE: 152 MMHG | HEART RATE: 65 BPM | RESPIRATION RATE: 16 BRPM | WEIGHT: 152 LBS | TEMPERATURE: 98.8 F | OXYGEN SATURATION: 97 %

## 2020-01-13 DIAGNOSIS — I10 ESSENTIAL HYPERTENSION: Primary | ICD-10-CM

## 2020-01-13 DIAGNOSIS — F32.A MILD DEPRESSION: ICD-10-CM

## 2020-01-13 PROBLEM — R07.9 CHEST PAIN: Status: RESOLVED | Noted: 2019-11-07 | Resolved: 2020-01-13

## 2020-01-13 LAB
CHEST PAIN STATEMENT: NORMAL
MAX DIASTOLIC BP: 84 MMHG
MAX HEART RATE: 139 BPM
MAX PREDICTED HEART RATE: 149 BPM
MAX. SYSTOLIC BP: 196 MMHG
PROTOCOL NAME: NORMAL
REASON FOR TERMINATION: NORMAL
TARGET HR FORMULA: NORMAL
TEST INDICATION: NORMAL
TIME IN EXERCISE PHASE: NORMAL

## 2020-01-13 PROCEDURE — 99213 OFFICE O/P EST LOW 20 MIN: CPT | Performed by: FAMILY MEDICINE

## 2020-01-13 NOTE — PROGRESS NOTES
Assessment/Plan:    1  Essential hypertension  Assessment & Plan:  Trial diet and exercise   Recheck in 2 month      2  Mild depression ([address] )  Assessment & Plan:  Off lexapro  Feeling better      BMI Counseling: Body mass index is 28 72 kg/m²  The BMI is above normal  Nutrition recommendations include encouraging healthy choices of fruits and vegetables  Exercise recommendations include exercising 3-5 times per week  No pharmacotherapy was ordered  There are no Patient Instructions on file for this visit  Return in about 2 months (around 3/13/2020)  Subjective:      Patient ID: Alfredo Padron is a 68 y o  male  Chief Complaint   Patient presents with    Follow-up     Patient here for 6 week follow up on Depression        Her for follow up  [address] 84 him feel emotionless  Had cardiac testing this month all normal  Discussed diet- increased salt in diet      Hypertension   This is a chronic problem  The current episode started more than 1 year ago  The problem is unchanged  The problem is controlled  There are no associated agents to hypertension  Past treatments include angiotensin blockers  The current treatment provides mild improvement  There are no compliance problems  The following portions of the patient's history were reviewed and updated as appropriate: allergies, current medications, past family history, past medical history, past social history, past surgical history and problem list     Review of Systems   Constitutional: Negative  HENT: Negative  Eyes: Negative  Respiratory: Negative  Cardiovascular: Negative  Gastrointestinal: Negative  Endocrine: Negative  Genitourinary: Negative  Musculoskeletal: Negative  Skin: Negative  Allergic/Immunologic: Negative  Neurological: Negative  Hematological: Negative  Psychiatric/Behavioral: Negative            Current Outpatient Medications   Medication Sig Dispense Refill    aspirin 81 MG tablet Take by mouth      atorvastatin (LIPITOR) 10 mg tablet TAKE 1 TABLET BY MOUTH EVERY DAY 90 tablet 3    esomeprazole (NexIUM) 20 mg capsule Take 20 mg by mouth every morning before breakfast      fluticasone (FLONASE) 50 mcg/act nasal spray 1 spray into each nostril as needed       latanoprost (XALATAN) 0 005 % ophthalmic solution Administer 1 drop to both eyes       levothyroxine 50 mcg tablet TAKE 1 TABLET BY MOUTH EVERY DAY ON AN EMPTY STOMACH 90 tablet 3    losartan (COZAAR) 100 MG tablet Take 1 tablet (100 mg total) by mouth daily 90 tablet 3    rizatriptan (MAXALT) 10 MG tablet Take 1 tablet (10 mg total) by mouth once as needed (PT takes this when having migraines) for up to 1 dose 9 tablet 3    TIMOLOL MALEATE OP Apply to eye      TURMERIC PO Take by mouth       No current facility-administered medications for this visit  Objective:    /80   Pulse 65   Temp 98 8 °F (37 1 °C)   Resp 16   Ht 5' 1" (1 549 m)   Wt 68 9 kg (152 lb)   SpO2 97%   BMI 28 72 kg/m²        Physical Exam   Constitutional: He appears well-developed and well-nourished  HENT:   Head: Normocephalic and atraumatic  Eyes: Pupils are equal, round, and reactive to light  Neck: Normal range of motion  Neck supple  Cardiovascular: Normal rate, regular rhythm, normal heart sounds and intact distal pulses  Pulmonary/Chest: Effort normal and breath sounds normal    Abdominal: Soft  Bowel sounds are normal    Musculoskeletal: Normal range of motion  Neurological: He is alert  Skin: Skin is warm  Capillary refill takes less than 2 seconds  Nursing note and vitals reviewed               Hanane Alas DO

## 2020-02-21 DIAGNOSIS — E03.9 ACQUIRED HYPOTHYROIDISM: ICD-10-CM

## 2020-02-21 RX ORDER — LEVOTHYROXINE SODIUM 0.05 MG/1
50 TABLET ORAL DAILY
Qty: 90 TABLET | Refills: 3 | Status: SHIPPED | OUTPATIENT
Start: 2020-02-21 | End: 2021-01-25 | Stop reason: SDUPTHER

## 2020-03-31 ENCOUNTER — TELEPHONE (OUTPATIENT)
Dept: UROLOGY | Facility: CLINIC | Age: 70
End: 2020-03-31

## 2020-03-31 NOTE — TELEPHONE ENCOUNTER
Please let the patient know that their upcoming urology visit will need to be cancelled due to medical staff consolidation for COVID-19        Please schedule the patient for a:    routine following with me in 6 weeks

## 2020-04-14 DIAGNOSIS — G44.001 INTRACTABLE CLUSTER HEADACHE SYNDROME, UNSPECIFIED CHRONICITY PATTERN: ICD-10-CM

## 2020-04-14 RX ORDER — RIZATRIPTAN BENZOATE 10 MG/1
TABLET ORAL
Qty: 9 TABLET | Refills: 3 | Status: SHIPPED | OUTPATIENT
Start: 2020-04-14 | End: 2021-12-20 | Stop reason: SDUPTHER

## 2020-04-21 DIAGNOSIS — I10 ESSENTIAL HYPERTENSION: ICD-10-CM

## 2020-04-22 RX ORDER — LOSARTAN POTASSIUM 100 MG/1
100 TABLET ORAL DAILY
Qty: 90 TABLET | Refills: 3 | Status: SHIPPED | OUTPATIENT
Start: 2020-04-22 | End: 2021-01-25 | Stop reason: SDUPTHER

## 2020-04-28 NOTE — TELEPHONE ENCOUNTER
This apt was originally postpone before virtual visits  Can we reschedule this apt sooner as a virtual visit?

## 2020-05-06 ENCOUNTER — TELEPHONE (OUTPATIENT)
Dept: UROLOGY | Facility: CLINIC | Age: 70
End: 2020-05-06

## 2020-07-21 ENCOUNTER — OFFICE VISIT (OUTPATIENT)
Dept: FAMILY MEDICINE CLINIC | Facility: CLINIC | Age: 70
End: 2020-07-21
Payer: MEDICARE

## 2020-07-21 VITALS
WEIGHT: 145.6 LBS | DIASTOLIC BLOOD PRESSURE: 80 MMHG | BODY MASS INDEX: 27.49 KG/M2 | OXYGEN SATURATION: 98 % | SYSTOLIC BLOOD PRESSURE: 150 MMHG | RESPIRATION RATE: 16 BRPM | HEIGHT: 61 IN | HEART RATE: 65 BPM | TEMPERATURE: 98.1 F

## 2020-07-21 DIAGNOSIS — M17.11 OSTEOARTHROSIS, LOCALIZED, PRIMARY, KNEE, RIGHT: ICD-10-CM

## 2020-07-21 DIAGNOSIS — K22.70 BARRETT'S ESOPHAGUS WITHOUT DYSPLASIA: ICD-10-CM

## 2020-07-21 DIAGNOSIS — E78.2 MIXED HYPERLIPIDEMIA: ICD-10-CM

## 2020-07-21 DIAGNOSIS — E03.9 ACQUIRED HYPOTHYROIDISM: Primary | ICD-10-CM

## 2020-07-21 DIAGNOSIS — R20.0 BILATERAL HAND NUMBNESS: ICD-10-CM

## 2020-07-21 DIAGNOSIS — I10 ESSENTIAL HYPERTENSION: ICD-10-CM

## 2020-07-21 PROCEDURE — 1160F RVW MEDS BY RX/DR IN RCRD: CPT | Performed by: FAMILY MEDICINE

## 2020-07-21 PROCEDURE — 99214 OFFICE O/P EST MOD 30 MIN: CPT | Performed by: FAMILY MEDICINE

## 2020-07-21 PROCEDURE — 4040F PNEUMOC VAC/ADMIN/RCVD: CPT | Performed by: FAMILY MEDICINE

## 2020-07-21 PROCEDURE — 3079F DIAST BP 80-89 MM HG: CPT | Performed by: FAMILY MEDICINE

## 2020-07-21 PROCEDURE — 3077F SYST BP >= 140 MM HG: CPT | Performed by: FAMILY MEDICINE

## 2020-07-21 PROCEDURE — 3008F BODY MASS INDEX DOCD: CPT | Performed by: FAMILY MEDICINE

## 2020-07-21 PROCEDURE — 1036F TOBACCO NON-USER: CPT | Performed by: FAMILY MEDICINE

## 2020-07-21 NOTE — PROGRESS NOTES
Assessment/Plan:    1  Acquired hypothyroidism  Assessment & Plan:  Check tsh    Orders:  -     TSH, 3rd generation with Free T4 reflex; Future; Expected date: 07/21/2020    2  Essential hypertension  Assessment & Plan:  Check bp's at home  Maybe higher in office    Orders:  -     CBC; Future; Expected date: 07/21/2020  -     Comprehensive metabolic panel; Future; Expected date: 07/21/2020    3  Mixed hyperlipidemia  Assessment & Plan:  Cont lipitor  Check lipids    Orders:  -     Lipid Panel with Direct LDL reflex; Future; Expected date: 07/21/2020    4  Roberts's esophagus without dysplasia  Assessment & Plan:  Discussed repeat EGD in 21/22  Cont nexium      5  Osteoarthrosis, localized, primary, knee, right  Assessment & Plan:  Cont with exercise-biking      6  Bilateral hand numbness  Assessment & Plan:  Possible from biking  Seems to be positional  Discussed seeing hand surgeon-will wait            There are no Patient Instructions on file for this visit  No follow-ups on file  Subjective:      Patient ID: Rex Rojas is a 68 y o  male  Chief Complaint   Patient presents with    Follow-up     Patient here for 6 month follow up on Hypertension        Here for follow up  Overall doing well  Biking more  Has lost weight  Fingers have going numb while biking  Using biking gloves recently  Having trouble swallowing- barium swallow years ago 2015- seen by ENT      Hypertension   This is a chronic problem  The current episode started more than 1 year ago  The problem is controlled  There are no associated agents to hypertension  Past treatments include angiotensin blockers  The current treatment provides significant improvement  There are no compliance problems  Hyperlipidemia   This is a chronic problem  The current episode started more than 1 year ago  The problem is controlled  Recent lipid tests were reviewed and are normal  There are no known factors aggravating his hyperlipidemia   The current treatment provides significant improvement of lipids  There are no compliance problems  Hand Pain    The incident occurred more than 1 week ago  The incident occurred at home  The injury mechanism was repetitive motion  The following portions of the patient's history were reviewed and updated as appropriate: allergies, current medications, past family history, past medical history, past social history, past surgical history and problem list     Review of Systems   Constitutional: Negative  HENT: Negative  Eyes: Negative  Respiratory: Negative  Cardiovascular: Negative  Gastrointestinal: Positive for abdominal pain (swallowing difficulty)  Endocrine: Negative  Genitourinary: Negative  Musculoskeletal: Positive for arthralgias (hand pain)  Skin: Negative  Allergic/Immunologic: Negative  Neurological: Negative  Hematological: Negative  Psychiatric/Behavioral: Negative  Current Outpatient Medications   Medication Sig Dispense Refill    aspirin 81 MG tablet Take by mouth      atorvastatin (LIPITOR) 10 mg tablet TAKE 1 TABLET BY MOUTH EVERY DAY (Patient taking differently: every other day ) 90 tablet 3    esomeprazole (NexIUM) 20 mg capsule Take 20 mg by mouth every morning before breakfast      fluticasone (FLONASE) 50 mcg/act nasal spray 1 spray into each nostril as needed       latanoprost (XALATAN) 0 005 % ophthalmic solution Administer 1 drop to both eyes       levothyroxine 50 mcg tablet Take 1 tablet (50 mcg total) by mouth daily Take on an empty stomach 90 tablet 3    losartan (COZAAR) 100 MG tablet Take 1 tablet (100 mg total) by mouth daily 90 tablet 3    rizatriptan (MAXALT) 10 MG tablet TAKE 1 TABLET BY MOUTH ONCE AS NEEDED FOR UP TO 1 DOSE (PATIENT TAKES THIS WHEN HAVING MIGRAINES) 9 tablet 3    TIMOLOL MALEATE OP Apply to eye      TURMERIC PO Take by mouth       No current facility-administered medications for this visit  Objective:    /80   Pulse 65   Temp 98 1 °F (36 7 °C)   Resp 16   Ht 5' 1" (1 549 m)   Wt 66 kg (145 lb 9 6 oz)   SpO2 98%   BMI 27 51 kg/m²        Physical Exam   Constitutional: He is oriented to person, place, and time  He appears well-developed and well-nourished  HENT:   Head: Normocephalic and atraumatic  Eyes: Pupils are equal, round, and reactive to light  EOM are normal    Neck: Normal range of motion  Neck supple  Cardiovascular: Normal rate, regular rhythm, normal heart sounds and intact distal pulses  Pulmonary/Chest: Effort normal and breath sounds normal    Abdominal: Soft  Bowel sounds are normal    Musculoskeletal: Normal range of motion  Neurological: He is alert and oriented to person, place, and time  Skin: Skin is warm  Capillary refill takes less than 2 seconds  Psychiatric: He has a normal mood and affect  His behavior is normal  Judgment and thought content normal    Nursing note and vitals reviewed               Sawyer Reza DO

## 2020-07-24 ENCOUNTER — APPOINTMENT (OUTPATIENT)
Dept: LAB | Age: 70
End: 2020-07-24
Payer: MEDICARE

## 2020-07-24 DIAGNOSIS — E03.9 ACQUIRED HYPOTHYROIDISM: ICD-10-CM

## 2020-07-24 DIAGNOSIS — I10 ESSENTIAL HYPERTENSION: ICD-10-CM

## 2020-07-24 DIAGNOSIS — E78.2 MIXED HYPERLIPIDEMIA: ICD-10-CM

## 2020-07-24 LAB
ALBUMIN SERPL BCP-MCNC: 3.6 G/DL (ref 3.5–5)
ALP SERPL-CCNC: 50 U/L (ref 46–116)
ALT SERPL W P-5'-P-CCNC: 19 U/L (ref 12–78)
ANION GAP SERPL CALCULATED.3IONS-SCNC: 3 MMOL/L (ref 4–13)
AST SERPL W P-5'-P-CCNC: 22 U/L (ref 5–45)
BILIRUB SERPL-MCNC: 0.89 MG/DL (ref 0.2–1)
BUN SERPL-MCNC: 13 MG/DL (ref 5–25)
CALCIUM SERPL-MCNC: 9.1 MG/DL (ref 8.3–10.1)
CHLORIDE SERPL-SCNC: 106 MMOL/L (ref 100–108)
CHOLEST SERPL-MCNC: 209 MG/DL (ref 50–200)
CO2 SERPL-SCNC: 30 MMOL/L (ref 21–32)
CREAT SERPL-MCNC: 1.09 MG/DL (ref 0.6–1.3)
ERYTHROCYTE [DISTWIDTH] IN BLOOD BY AUTOMATED COUNT: 12.5 % (ref 11.6–15.1)
GFR SERPL CREATININE-BSD FRML MDRD: 68 ML/MIN/1.73SQ M
GLUCOSE P FAST SERPL-MCNC: 104 MG/DL (ref 65–99)
HCT VFR BLD AUTO: 45.7 % (ref 36.5–49.3)
HDLC SERPL-MCNC: 66 MG/DL
HGB BLD-MCNC: 15.3 G/DL (ref 12–17)
LDLC SERPL CALC-MCNC: 123 MG/DL (ref 0–100)
MCH RBC QN AUTO: 29.8 PG (ref 26.8–34.3)
MCHC RBC AUTO-ENTMCNC: 33.5 G/DL (ref 31.4–37.4)
MCV RBC AUTO: 89 FL (ref 82–98)
PLATELET # BLD AUTO: 195 THOUSANDS/UL (ref 149–390)
PMV BLD AUTO: 10.6 FL (ref 8.9–12.7)
POTASSIUM SERPL-SCNC: 4.4 MMOL/L (ref 3.5–5.3)
PROT SERPL-MCNC: 6.6 G/DL (ref 6.4–8.2)
RBC # BLD AUTO: 5.14 MILLION/UL (ref 3.88–5.62)
SODIUM SERPL-SCNC: 139 MMOL/L (ref 136–145)
TRIGL SERPL-MCNC: 98 MG/DL
TSH SERPL DL<=0.05 MIU/L-ACNC: 1.5 UIU/ML (ref 0.36–3.74)
WBC # BLD AUTO: 5.27 THOUSAND/UL (ref 4.31–10.16)

## 2020-07-24 PROCEDURE — 84443 ASSAY THYROID STIM HORMONE: CPT

## 2020-07-24 PROCEDURE — 80053 COMPREHEN METABOLIC PANEL: CPT

## 2020-07-24 PROCEDURE — 80061 LIPID PANEL: CPT

## 2020-07-24 PROCEDURE — 85027 COMPLETE CBC AUTOMATED: CPT

## 2020-07-24 PROCEDURE — 36415 COLL VENOUS BLD VENIPUNCTURE: CPT

## 2020-07-30 ENCOUNTER — OFFICE VISIT (OUTPATIENT)
Dept: OBGYN CLINIC | Facility: HOSPITAL | Age: 70
End: 2020-07-30
Payer: MEDICARE

## 2020-07-30 VITALS
DIASTOLIC BLOOD PRESSURE: 74 MMHG | HEART RATE: 83 BPM | WEIGHT: 144 LBS | HEIGHT: 61 IN | BODY MASS INDEX: 27.19 KG/M2 | SYSTOLIC BLOOD PRESSURE: 161 MMHG

## 2020-07-30 DIAGNOSIS — M17.0 BILATERAL PRIMARY OSTEOARTHRITIS OF KNEE: Primary | ICD-10-CM

## 2020-07-30 PROCEDURE — 1036F TOBACCO NON-USER: CPT | Performed by: ORTHOPAEDIC SURGERY

## 2020-07-30 PROCEDURE — 3077F SYST BP >= 140 MM HG: CPT | Performed by: ORTHOPAEDIC SURGERY

## 2020-07-30 PROCEDURE — 3008F BODY MASS INDEX DOCD: CPT | Performed by: ORTHOPAEDIC SURGERY

## 2020-07-30 PROCEDURE — 4040F PNEUMOC VAC/ADMIN/RCVD: CPT | Performed by: ORTHOPAEDIC SURGERY

## 2020-07-30 PROCEDURE — 1160F RVW MEDS BY RX/DR IN RCRD: CPT | Performed by: ORTHOPAEDIC SURGERY

## 2020-07-30 PROCEDURE — 3078F DIAST BP <80 MM HG: CPT | Performed by: ORTHOPAEDIC SURGERY

## 2020-07-30 PROCEDURE — 20610 DRAIN/INJ JOINT/BURSA W/O US: CPT | Performed by: ORTHOPAEDIC SURGERY

## 2020-07-30 NOTE — PROGRESS NOTES
Assessment:  1  Bilateral primary osteoarthritis of knee         Plan:  The patient was provided with bilateral Orthovisc injections  He tolerated the procedure well  He should follow up in one week for 2nd bilateral Orthovisc  To do next visit:  Return in about 1 week (around 8/6/2020) for visc x2 (1st done today)  The above stated was discussed in layman's terms and the patient expressed understanding  All questions were answered to the patient's satisfaction  Scribe Attestation    I,:   Sahara Rao am acting as a scribe while in the presence of the attending physician :        I,:   Bonnie Peterson MD personally performed the services described in this documentation    as scribed in my presence :              Subjective:   Josh Ernst is a 68 y o  male who presents for initial evaluation of bilateral knees  He is here from [place] [id number]  Today he complains of left > right generalized knee pain  Prolonged activity aggravates while rest alleviates  He does bike daily for exercise  He does use a left knee hinged brace with benefit  He has tried steroid injection and visco-supplement injections in past with good benefit from the visco-suppement  He denies medications for the knees  He denies past knee surgery        Review of systems negative unless otherwise specified in HPI    Past Medical History:   Diagnosis Date    Acute non-recurrent maxillary sinusitis 4/15/2019    Angina effort     Anxiety     Roberts's esophagus     Bleeding from left ear 4/26/2019    Chest pain 11/7/2019    Chest pain, unspecified     Cough 2/13/2018    GERD (gastroesophageal reflux disease)     Hyperlipidemia     Hypertension     Impacted cerumen of right ear 4/25/2019    Migraine     Otorrhea, left 4/25/2019    SBO (small bowel obstruction) ([facility] [address] ) 3/23/2019       Past Surgical History:   Procedure Laterality Date    CATARACT EXTRACTION      EYE SURGERY      HERNIA REPAIR Right 2014       Family History   Problem Relation Age of Onset    No Known Problems Mother     Heart attack Father     Breast cancer Paternal Aunt        Social History     Occupational History    Not on file   Tobacco Use    Smoking status: Former Smoker     Packs/day: 0 25     Years: 4 00     Pack years: 1 00     Types: Cigarettes     Last attempt to quit: Bethel Cueva     Years since quittin 6    Smokeless tobacco: Never Used   Substance and Sexual Activity    Alcohol use: Yes     Frequency: 2-3 times a week     Drinks per session: 1 or 2     Binge frequency: Never    Drug use: Never    Sexual activity: Not on file         Current Outpatient Medications:     aspirin 81 MG tablet, Take by mouth, Disp: , Rfl:     atorvastatin (LIPITOR) 10 mg tablet, TAKE 1 TABLET BY MOUTH EVERY DAY (Patient taking differently: every other day ), Disp: 90 tablet, Rfl: 3    esomeprazole (NexIUM) 20 mg capsule, Take 20 mg by mouth every morning before breakfast, Disp: , Rfl:     fluticasone (FLONASE) 50 mcg/act nasal spray, 1 spray into each nostril as needed , Disp: , Rfl:     latanoprost (XALATAN) 0 005 % ophthalmic solution, Administer 1 drop to both eyes , Disp: , Rfl:     levothyroxine 50 mcg tablet, Take 1 tablet (50 mcg total) by mouth daily Take on an empty stomach, Disp: 90 tablet, Rfl: 3    losartan (COZAAR) 100 MG tablet, Take 1 tablet (100 mg total) by mouth daily, Disp: 90 tablet, Rfl: 3    rizatriptan (MAXALT) 10 MG tablet, TAKE 1 TABLET BY MOUTH ONCE AS NEEDED FOR UP TO 1 DOSE (PATIENT TAKES THIS WHEN HAVING MIGRAINES), Disp: 9 tablet, Rfl: 3    TIMOLOL MALEATE OP, Apply to eye, Disp: , Rfl:     TURMERIC PO, Take by mouth, Disp: , Rfl:     Allergies   Allergen Reactions    Influenza Vaccines Other (See Comments)     Flu like symptoms     Nitrous Oxide             Vitals:    20 1358   BP: 161/74   Pulse: 83       Objective:  Physical exam  · General: Awake, Alert, Oriented  · Eyes: Pupils equal, round and reactive to light  · Heart: regular rate and rhythm  · Lungs: No audible wheezing  · Abdomen: soft                    Ortho Exam   Bilateral knees:  Mild varus alignment  No erythema or ecchymosis  No effusion or swelling  Normal strength  Good ROM with crepitus   Calf compartments soft and supple  Sensation intact  Toes are warm sensate and mobile        Diagnostics, reviewed and taken today if performed as documented:    None performed     Procedures, if performed today:    Large joint arthrocentesis: R knee  Date/Time: 7/30/2020 2:18 PM  Consent given by: patient  Site marked: site marked  Timeout: Immediately prior to procedure a time out was called to verify the correct patient, procedure, equipment, support staff and site/side marked as required   Supporting Documentation  Indications: pain   Procedure Details  Location: knee - R knee  Preparation: Patient was prepped and draped in the usual sterile fashion  Needle size: 22 G  Ultrasound guidance: no  Approach: anterolateral  Medications administered: 30 mg sodium hyaluronate 30 mg/2 mL    Patient tolerance: patient tolerated the procedure well with no immediate complications  Dressing:  Sterile dressing applied    Large joint arthrocentesis: L knee  Date/Time: 7/30/2020 2:18 PM  Consent given by: patient  Site marked: site marked  Timeout: Immediately prior to procedure a time out was called to verify the correct patient, procedure, equipment, support staff and site/side marked as required   Supporting Documentation  Indications: pain   Procedure Details  Location: knee - L knee  Preparation: Patient was prepped and draped in the usual sterile fashion  Needle size: 22 G  Ultrasound guidance: no  Approach: anterolateral  Medications administered: 30 mg sodium hyaluronate 30 mg/2 mL    Patient tolerance: patient tolerated the procedure well with no immediate complications  Dressing:  Sterile dressing applied               Portions of the record may have been created with voice recognition software  Occasional wrong word or "sound a like" substitutions may have occurred due to the inherent limitations of voice recognition software  Read the chart carefully and recognize, using context, where substitutions have occurred

## 2020-07-31 ENCOUNTER — PREP FOR PROCEDURE (OUTPATIENT)
Dept: OBGYN CLINIC | Facility: HOSPITAL | Age: 70
End: 2020-07-31

## 2020-07-31 DIAGNOSIS — M17.0 PRIMARY OSTEOARTHRITIS OF BOTH KNEES: Primary | ICD-10-CM

## 2020-08-11 DIAGNOSIS — Z12.11 SCREENING FOR COLON CANCER: Primary | ICD-10-CM

## 2020-08-13 ENCOUNTER — OFFICE VISIT (OUTPATIENT)
Dept: OBGYN CLINIC | Facility: HOSPITAL | Age: 70
End: 2020-08-13
Payer: MEDICARE

## 2020-08-13 VITALS
SYSTOLIC BLOOD PRESSURE: 138 MMHG | HEART RATE: 61 BPM | DIASTOLIC BLOOD PRESSURE: 68 MMHG | HEIGHT: 61 IN | BODY MASS INDEX: 27.21 KG/M2

## 2020-08-13 DIAGNOSIS — M17.0 BILATERAL PRIMARY OSTEOARTHRITIS OF KNEE: Primary | ICD-10-CM

## 2020-08-13 PROCEDURE — 1160F RVW MEDS BY RX/DR IN RCRD: CPT | Performed by: ORTHOPAEDIC SURGERY

## 2020-08-13 PROCEDURE — 3078F DIAST BP <80 MM HG: CPT | Performed by: ORTHOPAEDIC SURGERY

## 2020-08-13 PROCEDURE — 4040F PNEUMOC VAC/ADMIN/RCVD: CPT | Performed by: ORTHOPAEDIC SURGERY

## 2020-08-13 PROCEDURE — 3075F SYST BP GE 130 - 139MM HG: CPT | Performed by: ORTHOPAEDIC SURGERY

## 2020-08-13 PROCEDURE — 20610 DRAIN/INJ JOINT/BURSA W/O US: CPT | Performed by: ORTHOPAEDIC SURGERY

## 2020-08-13 NOTE — PROGRESS NOTES
Assessment:  1  Bilateral primary osteoarthritis of knee         Plan:  The patient was provided with 3rd bilateral knee Orthovisc injection  He tolerated the procedure well  He should follow up in 3 months  To do next visit:  Return in about 3 months (around 11/13/2020)  The above stated was discussed in layman's terms and the patient expressed understanding  All questions were answered to the patient's satisfaction  Scribe Attestation    I,:   Merrill Orosco am acting as a scribe while in the presence of the attending physician :        I,:   Angeline Rosales MD personally performed the services described in this documentation    as scribed in my presence :              Subjective:   Chan Yao is a 68 y o  male who presents for 3rd bilateral Orthovisc injection  He feels about the same  Today he complains of bilateral generalized knee pain  Weight bearing aggravates while rest alleviates            Review of systems negative unless otherwise specified in HPI    Past Medical History:   Diagnosis Date    Acute non-recurrent maxillary sinusitis 4/15/2019    Angina effort     Anxiety     Roberts's esophagus     Bleeding from left ear 4/26/2019    Chest pain 11/7/2019    Chest pain, unspecified     Cough 2/13/2018    GERD (gastroesophageal reflux disease)     Hyperlipidemia     Hypertension     Impacted cerumen of right ear 4/25/2019    Migraine     Otorrhea, left 4/25/2019    [facility]BO [facility] bowel obstruction) ([facility] [address] ) 3/23/2019       Past Surgical History:   Procedure Laterality Date    CATARACT EXTRACTION      EYE SURGERY      HERNIA REPAIR Right 2014       Family History   Problem Relation Age of Onset    No Known Problems Mother     Heart attack Father     Breast cancer Paternal Aunt        Social History     Occupational History    Not on file   Tobacco Use    Smoking status: Former Smoker     Packs/day: 0 25     Years: 4 00     Pack years: 1 00     Types: Cigarettes     Last attempt to quit:      Years since quittin 6    Smokeless tobacco: Never Used   Substance and Sexual Activity    Alcohol use: Yes     Frequency: 2-3 times a week     Drinks per session: 1 or 2     Binge frequency: Never    Drug use: Never    Sexual activity: Not on file         Current Outpatient Medications:     aspirin 81 MG tablet, Take by mouth, Disp: , Rfl:     atorvastatin (LIPITOR) 10 mg tablet, TAKE 1 TABLET BY MOUTH EVERY DAY (Patient taking differently: every other day ), Disp: 90 tablet, Rfl: 3    esomeprazole (NexIUM) 20 mg capsule, Take 20 mg by mouth every morning before breakfast, Disp: , Rfl:     fluticasone (FLONASE) 50 mcg/act nasal spray, 1 spray into each nostril as needed , Disp: , Rfl:     latanoprost (XALATAN) 0 005 % ophthalmic solution, Administer 1 drop to both eyes , Disp: , Rfl:     levothyroxine 50 mcg tablet, Take 1 tablet (50 mcg total) by mouth daily Take on an empty stomach, Disp: 90 tablet, Rfl: 3    losartan (COZAAR) 100 MG tablet, Take 1 tablet (100 mg total) by mouth daily, Disp: 90 tablet, Rfl: 3    rizatriptan (MAXALT) 10 MG tablet, TAKE 1 TABLET BY MOUTH ONCE AS NEEDED FOR UP TO 1 DOSE (PATIENT TAKES THIS WHEN HAVING MIGRAINES), Disp: 9 tablet, Rfl: 3    TIMOLOL MALEATE OP, Apply to eye, Disp: , Rfl:     TURMERIC PO, Take by mouth, Disp: , Rfl:     Allergies   Allergen Reactions    Influenza Vaccines Other (See Comments)     Flu like symptoms     Nitrous Oxide             Vitals:    20 1410   BP: 138/68   Pulse: 61       Objective:  Physical exam  · General: Awake, Alert, Oriented  · Eyes: Pupils equal, round and reactive to light  · Heart: regular rate and rhythm  · Lungs: No audible wheezing  · Abdomen: soft                    Ortho Exam   Bilateral knees:  Mild varus alignment  No erythema or ecchymosis  No effusion or swelling  Normal strength  Good ROM with crepitus   Calf compartments soft and supple  Sensation intact  Toes are warm sensate and mobile        Diagnostics, reviewed and taken today if performed as documented:    None performed     Procedures, if performed today:    Large joint arthrocentesis: R knee  Date/Time: 8/13/2020 2:21 PM  Consent given by: patient  Site marked: site marked  Timeout: Immediately prior to procedure a time out was called to verify the correct patient, procedure, equipment, support staff and site/side marked as required   Supporting Documentation  Indications: pain   Procedure Details  Location: knee - R knee  Preparation: Patient was prepped and draped in the usual sterile fashion  Needle size: 22 G  Ultrasound guidance: no  Approach: anterolateral  Medications administered: 30 mg sodium hyaluronate 30 mg/2 mL    Patient tolerance: patient tolerated the procedure well with no immediate complications  Dressing:  Sterile dressing applied    Large joint arthrocentesis: L knee  Date/Time: 8/13/2020 2:21 PM  Consent given by: patient  Site marked: site marked  Timeout: Immediately prior to procedure a time out was called to verify the correct patient, procedure, equipment, support staff and site/side marked as required   Supporting Documentation  Indications: pain   Procedure Details  Location: knee - L knee  Preparation: Patient was prepped and draped in the usual sterile fashion  Needle size: 22 G  Ultrasound guidance: no  Approach: anterolateral  Medications administered: 30 mg sodium hyaluronate 30 mg/2 mL    Patient tolerance: patient tolerated the procedure well with no immediate complications  Dressing:  Sterile dressing applied             Portions of the record may have been created with voice recognition software  Occasional wrong word or "sound a like" substitutions may have occurred due to the inherent limitations of voice recognition software  Read the chart carefully and recognize, using context, where substitutions have occurred

## 2020-09-14 ENCOUNTER — OFFICE VISIT (OUTPATIENT)
Dept: CARDIOLOGY CLINIC | Facility: CLINIC | Age: 70
End: 2020-09-14
Payer: MEDICARE

## 2020-09-14 VITALS
HEIGHT: 61 IN | DIASTOLIC BLOOD PRESSURE: 78 MMHG | HEART RATE: 62 BPM | BODY MASS INDEX: 26.62 KG/M2 | OXYGEN SATURATION: 97 % | SYSTOLIC BLOOD PRESSURE: 142 MMHG | WEIGHT: 141 LBS

## 2020-09-14 DIAGNOSIS — E78.2 MIXED HYPERLIPIDEMIA: ICD-10-CM

## 2020-09-14 DIAGNOSIS — R07.9 CHEST PAIN, UNSPECIFIED TYPE: ICD-10-CM

## 2020-09-14 DIAGNOSIS — I10 ESSENTIAL HYPERTENSION: Primary | ICD-10-CM

## 2020-09-14 PROCEDURE — 99213 OFFICE O/P EST LOW 20 MIN: CPT | Performed by: INTERNAL MEDICINE

## 2020-09-14 RX ORDER — ATORVASTATIN CALCIUM 10 MG/1
10 TABLET, FILM COATED ORAL EVERY OTHER DAY
Qty: 45 TABLET | Refills: 3 | Status: SHIPPED | OUTPATIENT
Start: 2020-09-14 | End: 2021-10-12 | Stop reason: SDUPTHER

## 2020-09-14 NOTE — ASSESSMENT & PLAN NOTE
The patient denies any symptoms of chest pain but has some left shoulder discomfort  This is not considered to be suggestive of angina at this time

## 2020-09-14 NOTE — PROGRESS NOTES
Assessment/Plan:    Chest pain  The patient denies any symptoms of chest pain but has some left shoulder discomfort  This is not considered to be suggestive of angina at this time  Mixed hyperlipidemia  Hyperlipidemia, stable  We will continue atorvastatin at 10 mg daily  Essential hypertension  Hypertension, stable and adequately controlled  Diagnoses and all orders for this visit:    Essential hypertension    Mixed hyperlipidemia    Chest pain, unspecified type          Subjective:  Feels generally well  Some left shoulder discomfort from time to time  Patient ID: Vladislav Gibson is a 68 y o  male  The patient presented to this office for the purpose of cardiac follow-up  He has a known history of hypertension hyperlipidemia  He has experienced in the past some atypical chest pain but no evidence of ischemia  At this point the patient is feeling rather well denying any symptoms of chest pain however he experienced occasional left shoulder discomfort unrelated to activity  He denies any symptoms of shortness of breath, palpitation, dizziness or lightheadedness  He has no leg edema  The following portions of the patient's history were reviewed and updated as appropriate: allergies, current medications, past family history, past medical history, past social history, past surgical history and problem list     Review of Systems   Respiratory: Negative for apnea, cough, chest tightness, shortness of breath and wheezing  Cardiovascular: Negative for chest pain, palpitations and leg swelling  Gastrointestinal: Negative for abdominal pain  Neurological: Negative for dizziness and light-headedness  Hematological: Negative  Psychiatric/Behavioral: Negative  Objective:  Stable cardiac-wise        /78 (BP Location: Left arm, Patient Position: Sitting, Cuff Size: Adult)   Pulse 62   Ht 5' 1" (1 549 m)   Wt 64 kg (141 lb)   SpO2 97%   BMI 26 64 kg/m²     The patient is advised to continue the same medications  Physical Exam  Vitals signs reviewed  Constitutional:       General: He is not in acute distress  Appearance: He is well-developed  He is not diaphoretic  HENT:      Head: Normocephalic  Eyes:      Pupils: Pupils are equal, round, and reactive to light  Neck:      Musculoskeletal: Normal range of motion  Thyroid: No thyromegaly  Vascular: No JVD  Cardiovascular:      Rate and Rhythm: Normal rate and regular rhythm  Heart sounds: S1 normal and S2 normal  No murmur  No friction rub  No gallop  Pulmonary:      Effort: Pulmonary effort is normal  No respiratory distress  Breath sounds: Normal breath sounds  No wheezing or rales  Chest:      Chest wall: No tenderness  Abdominal:      Palpations: Abdomen is soft  Musculoskeletal: Normal range of motion  General: No tenderness or deformity  Skin:     General: Skin is warm and dry  Neurological:      Mental Status: He is alert and oriented to person, place, and time

## 2020-09-14 NOTE — LETTER
September 14, 2020     Referral [address] [place] [id number]    Patient: Selina Chilel   YOB: 1950   Date of Visit: 9/14/2020       Dear Dr Ned Contreras:    Thank you for referring Selina Chilel to me for evaluation  Below are my notes for this consultation  If you have questions, please do not hesitate to call me  I look forward to following your patient along with you  Sincerely,        Raven Brown MD        CC: DO Raven Olsen MD  9/14/2020  2:45 PM  Sign when Signing Visit  Assessment/Plan:    Chest pain  The patient denies any symptoms of chest pain but has some left shoulder discomfort  This is not considered to be suggestive of angina at this time  Mixed hyperlipidemia  Hyperlipidemia, stable  We will continue atorvastatin at 10 mg daily  Essential hypertension  Hypertension, stable and adequately controlled  Diagnoses and all orders for this visit:    Essential hypertension    Mixed hyperlipidemia    Chest pain, unspecified type          Subjective:  Feels generally well  Some left shoulder discomfort from time to time  Patient ID: Selina Chilel is a 68 y o  male  The patient presented to this office for the purpose of cardiac follow-up  He has a known history of hypertension hyperlipidemia  He has experienced in the past some atypical chest pain but no evidence of ischemia  At this point the patient is feeling rather well denying any symptoms of chest pain however he experienced occasional left shoulder discomfort unrelated to activity  He denies any symptoms of shortness of breath, palpitation, dizziness or lightheadedness  He has no leg edema        The following portions of the patient's history were reviewed and updated as appropriate: allergies, current medications, past family history, past medical history, past social history, past surgical history and problem list     Review of Systems   Respiratory: Negative for apnea, cough, chest tightness, shortness of breath and wheezing  Cardiovascular: Negative for chest pain, palpitations and leg swelling  Gastrointestinal: Negative for abdominal pain  Neurological: Negative for dizziness and light-headedness  Hematological: Negative  Psychiatric/Behavioral: Negative  Objective:  Stable cardiac-wise  /78 (BP Location: Left arm, Patient Position: Sitting, Cuff Size: Adult)   Pulse 62   Ht 5' 1" (1 549 m)   Wt 64 kg (141 lb)   SpO2 97%   BMI 26 64 kg/m²     The patient is advised to continue the same medications  Physical Exam  Vitals signs reviewed  Constitutional:       General: He is not in acute distress  Appearance: He is well-developed  He is not diaphoretic  HENT:      Head: Normocephalic  Eyes:      Pupils: Pupils are equal, round, and reactive to light  Neck:      Musculoskeletal: Normal range of motion  Thyroid: No thyromegaly  Vascular: No JVD  Cardiovascular:      Rate and Rhythm: Normal rate and regular rhythm  Heart sounds: S1 normal and S2 normal  No murmur  No friction rub  No gallop  Pulmonary:      Effort: Pulmonary effort is normal  No respiratory distress  Breath sounds: Normal breath sounds  No wheezing or rales  Chest:      Chest wall: No tenderness  Abdominal:      Palpations: Abdomen is soft  Musculoskeletal: Normal range of motion  General: No tenderness or deformity  Skin:     General: Skin is warm and dry  Neurological:      Mental Status: He is alert and oriented to person, place, and time

## 2020-11-11 ENCOUNTER — OFFICE VISIT (OUTPATIENT)
Dept: OBGYN CLINIC | Facility: HOSPITAL | Age: 70
End: 2020-11-11
Payer: MEDICARE

## 2020-11-11 VITALS
HEIGHT: 61 IN | SYSTOLIC BLOOD PRESSURE: 128 MMHG | DIASTOLIC BLOOD PRESSURE: 75 MMHG | BODY MASS INDEX: 26.64 KG/M2 | HEART RATE: 66 BPM

## 2020-11-11 DIAGNOSIS — M17.0 BILATERAL PRIMARY OSTEOARTHRITIS OF KNEE: Primary | ICD-10-CM

## 2020-11-11 DIAGNOSIS — G89.29 CHRONIC PAIN OF BOTH KNEES: ICD-10-CM

## 2020-11-11 DIAGNOSIS — M25.561 CHRONIC PAIN OF BOTH KNEES: ICD-10-CM

## 2020-11-11 DIAGNOSIS — M25.562 CHRONIC PAIN OF BOTH KNEES: ICD-10-CM

## 2020-11-11 PROCEDURE — 99213 OFFICE O/P EST LOW 20 MIN: CPT | Performed by: ORTHOPAEDIC SURGERY

## 2020-11-11 RX ORDER — TIMOLOL MALEATE 5 MG/ML
SOLUTION/ DROPS OPHTHALMIC
COMMUNITY
Start: 2020-09-21

## 2020-11-17 ENCOUNTER — OFFICE VISIT (OUTPATIENT)
Dept: FAMILY MEDICINE CLINIC | Facility: CLINIC | Age: 70
End: 2020-11-17
Payer: MEDICARE

## 2020-11-17 VITALS
SYSTOLIC BLOOD PRESSURE: 130 MMHG | HEIGHT: 61 IN | DIASTOLIC BLOOD PRESSURE: 70 MMHG | HEART RATE: 55 BPM | TEMPERATURE: 97.4 F | OXYGEN SATURATION: 97 % | WEIGHT: 144 LBS | RESPIRATION RATE: 14 BRPM | BODY MASS INDEX: 27.19 KG/M2

## 2020-11-17 DIAGNOSIS — I10 ESSENTIAL HYPERTENSION: ICD-10-CM

## 2020-11-17 DIAGNOSIS — Z00.00 MEDICARE ANNUAL WELLNESS VISIT, SUBSEQUENT: Primary | ICD-10-CM

## 2020-11-17 DIAGNOSIS — Z12.5 SCREENING FOR PROSTATE CANCER: ICD-10-CM

## 2020-11-17 DIAGNOSIS — E03.9 ACQUIRED HYPOTHYROIDISM: ICD-10-CM

## 2020-11-17 DIAGNOSIS — E78.2 MIXED HYPERLIPIDEMIA: ICD-10-CM

## 2020-11-17 PROBLEM — R07.9 CHEST PAIN: Status: RESOLVED | Noted: 2019-11-07 | Resolved: 2020-11-17

## 2020-11-17 PROCEDURE — G0438 PPPS, INITIAL VISIT: HCPCS | Performed by: FAMILY MEDICINE

## 2021-01-25 DIAGNOSIS — I10 ESSENTIAL HYPERTENSION: ICD-10-CM

## 2021-01-25 DIAGNOSIS — E03.9 ACQUIRED HYPOTHYROIDISM: ICD-10-CM

## 2021-01-25 RX ORDER — LOSARTAN POTASSIUM 100 MG/1
100 TABLET ORAL DAILY
Qty: 90 TABLET | Refills: 3 | Status: SHIPPED | OUTPATIENT
Start: 2021-01-25 | End: 2022-05-27 | Stop reason: SDUPTHER

## 2021-01-25 RX ORDER — LEVOTHYROXINE SODIUM 0.05 MG/1
50 TABLET ORAL DAILY
Qty: 90 TABLET | Refills: 3 | Status: SHIPPED | OUTPATIENT
Start: 2021-01-25 | End: 2022-03-28 | Stop reason: SDUPTHER

## 2021-02-03 DIAGNOSIS — J01.00 ACUTE NON-RECURRENT MAXILLARY SINUSITIS: Primary | ICD-10-CM

## 2021-02-03 RX ORDER — AMOXICILLIN AND CLAVULANATE POTASSIUM 875; 125 MG/1; MG/1
1 TABLET, FILM COATED ORAL EVERY 12 HOURS SCHEDULED
Qty: 20 TABLET | Refills: 0 | Status: SHIPPED | OUTPATIENT
Start: 2021-02-03 | End: 2021-02-13

## 2021-02-04 ENCOUNTER — IMMUNIZATIONS (OUTPATIENT)
Dept: FAMILY MEDICINE CLINIC | Facility: HOSPITAL | Age: 71
End: 2021-02-04

## 2021-02-04 DIAGNOSIS — Z23 ENCOUNTER FOR IMMUNIZATION: Primary | ICD-10-CM

## 2021-02-04 PROCEDURE — 91300 SARS-COV-2 / COVID-19 MRNA VACCINE (PFIZER-BIONTECH) 30 MCG: CPT

## 2021-02-04 PROCEDURE — 0001A SARS-COV-2 / COVID-19 MRNA VACCINE (PFIZER-BIONTECH) 30 MCG: CPT

## 2021-02-08 ENCOUNTER — TELEPHONE (OUTPATIENT)
Dept: FAMILY MEDICINE CLINIC | Facility: CLINIC | Age: 71
End: 2021-02-08

## 2021-02-08 DIAGNOSIS — Z20.822 EXPOSURE TO COVID-19 VIRUS: Primary | ICD-10-CM

## 2021-02-08 LAB — SARS-COV-2 RNA RESP QL NAA+PROBE: NEGATIVE

## 2021-02-08 PROCEDURE — 87635 SARS-COV-2 COVID-19 AMP PRB: CPT | Performed by: FAMILY MEDICINE

## 2021-02-08 NOTE — TELEPHONE ENCOUNTER
Is definitely not antibiotic  And too far out for covid vaccine at this point-usually 24-48 hours  He needs a covid test- I will put in an order for him  Please refer him to sylvie sarkar or Sue Carias for the swab

## 2021-02-08 NOTE — TELEPHONE ENCOUNTER
Pt had high  and temp of 101, shakes and chills last night  Pt asking if this may be a reaction to the 1st Covid vaccine on 02/04/2021  Also asking if it may be an adverse reaction to the Augmentin  Asking if he should continue taking the Augmentin and if there is anything to assist with the symptoms from the vaccine

## 2021-02-09 NOTE — RESULT ENCOUNTER NOTE
please Naif Gordon and let him know that his COVID-19 swab was negative   If he is still not feeling well, set him up with a virtual visit if he would like

## 2021-02-16 ENCOUNTER — OFFICE VISIT (OUTPATIENT)
Dept: OBGYN CLINIC | Facility: HOSPITAL | Age: 71
End: 2021-02-16
Payer: MEDICARE

## 2021-02-16 VITALS
SYSTOLIC BLOOD PRESSURE: 157 MMHG | HEIGHT: 61 IN | WEIGHT: 146 LBS | DIASTOLIC BLOOD PRESSURE: 95 MMHG | HEART RATE: 58 BPM | BODY MASS INDEX: 27.56 KG/M2

## 2021-02-16 DIAGNOSIS — M25.561 CHRONIC PAIN OF BOTH KNEES: ICD-10-CM

## 2021-02-16 DIAGNOSIS — M25.562 CHRONIC PAIN OF BOTH KNEES: ICD-10-CM

## 2021-02-16 DIAGNOSIS — M17.0 BILATERAL PRIMARY OSTEOARTHRITIS OF KNEE: Primary | ICD-10-CM

## 2021-02-16 DIAGNOSIS — G89.29 CHRONIC PAIN OF BOTH KNEES: ICD-10-CM

## 2021-02-16 PROCEDURE — 20610 DRAIN/INJ JOINT/BURSA W/O US: CPT | Performed by: ORTHOPAEDIC SURGERY

## 2021-02-16 NOTE — PROGRESS NOTES
Assessment:  1  Bilateral primary osteoarthritis of knee     2  Chronic pain of both knees         Plan:  The patient was provided with 1st bilateral Synvisc injections  He tolerated the procedure well  He should follow up in one week for 2nd bilateral Synvisc injections  To do next visit:  Return in about 1 week (around 2/23/2021) for 2nd bilateral Synvisc injections  The above stated was discussed in layman's terms and the patient expressed understanding  All questions were answered to the patient's satisfaction  Scribe Attestation    I,:  Jodi Almonte am acting as a scribe while in the presence of the attending physician :       I,:  Luis Burt MD personally performed the services described in this documentation    as scribed in my presence :             Subjective:   Brandon Shafer is a 65 y o  male who presents for follow up of bilateral knees and 1st bilateral Synvisc injections  Today he complains of bilateral generalized knee pain  The pain is described as an ache yet can be stabbing at times  Prolonged weight bearing and repetitive bending aggravates while rest alleviates  He does bike daily for exercise          Review of systems negative unless otherwise specified in HPI    Past Medical History:   Diagnosis Date    Acute non-recurrent maxillary sinusitis 4/15/2019    Angina effort     Anxiety     Roberts's esophagus     Bleeding from left ear 4/26/2019    Chest pain 11/7/2019    Chest pain, unspecified     Cough 2/13/2018    GERD (gastroesophageal reflux disease)     Hyperlipidemia     Hypertension     Impacted cerumen of right ear 4/25/2019    Migraine     Otorrhea, left 4/25/2019    SBO (small bowel obstruction) ([facility] [address] ) 3/23/2019       Past Surgical History:   Procedure Laterality Date    CATARACT EXTRACTION      EYE SURGERY      HERNIA REPAIR Right 2014       Family History   Problem Relation Age of Onset    No Known Problems Mother     Heart attack Father  Breast cancer Paternal Aunt        Social History     Occupational History    Not on file   Tobacco Use    Smoking status: Former Smoker     Packs/day: 0 25     Years: 4 00     Pack years: 1 00     Types: Cigarettes     Quit date:      Years since quittin 1    Smokeless tobacco: Never Used   Substance and Sexual Activity    Alcohol use: Yes     Frequency: 2-3 times a week     Drinks per session: 1 or 2     Binge frequency: Never    Drug use: Never    Sexual activity: Not on file         Current Outpatient Medications:     aspirin 81 MG tablet, Take by mouth, Disp: , Rfl:     atorvastatin (LIPITOR) 10 mg tablet, Take 1 tablet (10 mg total) by mouth every other day, Disp: 45 tablet, Rfl: 3    esomeprazole (NexIUM) 20 mg capsule, Take 20 mg by mouth every morning before breakfast, Disp: , Rfl:     fluticasone (FLONASE) 50 mcg/act nasal spray, 1 spray into each nostril as needed , Disp: , Rfl:     latanoprost (XALATAN) 0 005 % ophthalmic solution, Administer 1 drop to both eyes , Disp: , Rfl:     levothyroxine 50 mcg tablet, Take 1 tablet (50 mcg total) by mouth daily Take on an empty stomach, Disp: 90 tablet, Rfl: 3    losartan (COZAAR) 100 MG tablet, Take 1 tablet (100 mg total) by mouth daily, Disp: 90 tablet, Rfl: 3    rizatriptan (MAXALT) 10 MG tablet, TAKE 1 TABLET BY MOUTH ONCE AS NEEDED FOR UP TO 1 DOSE (PATIENT TAKES THIS WHEN HAVING MIGRAINES), Disp: 9 tablet, Rfl: 3    timolol (TIMOPTIC) 0 5 % ophthalmic solution, INSTILL 1 DROP IN [facility] EYE TWO TIMES DAILY, Disp: , Rfl:     TIMOLOL MALEATE OP, Apply to eye, Disp: , Rfl:     TURMERIC PO, Take by mouth, Disp: , Rfl:     Allergies   Allergen Reactions    Influenza Vaccines Other (See Comments)     Flu like symptoms     Nitrous Oxide             Vitals:    21 0949   BP: 157/95   Pulse: 58       Objective:  Physical exam  · General: Awake, Alert, Oriented  · Eyes: Pupils equal, round and reactive to light  · Heart: regular rate and rhythm  · Lungs: No audible wheezing  · Abdomen: soft                    Ortho Exam   Bilateral knee:  Well muscled thighs bilaterally   No erythema or ecchymosis  No effusion or swelling  Normal strength  Good ROM with crepitation   Calf compartments soft and supple  Sensation intact  Toes are warm sensate and mobile        Diagnostics, reviewed and taken today if performed as documented:    None performed    Procedures, if performed today:    Large joint arthrocentesis: R knee  Universal Protocol:  Consent: Verbal consent obtained  Risks and benefits: risks, benefits and alternatives were discussed  Consent given by: patient  Time out: Immediately prior to procedure a "time out" was called to verify the correct patient, procedure, equipment, support staff and site/side marked as required  Timeout called at: 2/16/2021 10:00 AM   Patient understanding: patient states understanding of the procedure being performed  Patient identity confirmed: verbally with patient    Supporting Documentation  Indications: pain   Procedure Details  Location: knee - R knee  Preparation: Patient was prepped and draped in the usual sterile fashion  Needle size: 22 G  Ultrasound guidance: no  Approach: anterolateral  Medications administered: 16 mg hylan 16 MG/2ML    Patient tolerance: patient tolerated the procedure well with no immediate complications  Dressing:  Sterile dressing applied    Large joint arthrocentesis: L knee  Universal Protocol:  Consent: Verbal consent obtained  Risks and benefits: risks, benefits and alternatives were discussed  Consent given by: patient  Time out: Immediately prior to procedure a "time out" was called to verify the correct patient, procedure, equipment, support staff and site/side marked as required    Timeout called at: 2/16/2021 10:01 AM   Patient understanding: patient states understanding of the procedure being performed  Patient identity confirmed: verbally with patient    Supporting Documentation  Indications: pain   Procedure Details  Location: knee - L knee  Preparation: Patient was prepped and draped in the usual sterile fashion  Needle size: 22 G  Ultrasound guidance: no  Approach: anterolateral  Medications administered: 16 mg hylan 16 MG/2ML    Patient tolerance: patient tolerated the procedure well with no immediate complications  Dressing:  Sterile dressing applied              Portions of the record may have been created with voice recognition software  Occasional wrong word or "sound a like" substitutions may have occurred due to the inherent limitations of voice recognition software  Read the chart carefully and recognize, using context, where substitutions have occurred

## 2021-02-23 ENCOUNTER — OFFICE VISIT (OUTPATIENT)
Dept: OBGYN CLINIC | Facility: HOSPITAL | Age: 71
End: 2021-02-23
Payer: MEDICARE

## 2021-02-23 VITALS
HEART RATE: 71 BPM | SYSTOLIC BLOOD PRESSURE: 135 MMHG | BODY MASS INDEX: 27.59 KG/M2 | DIASTOLIC BLOOD PRESSURE: 79 MMHG | HEIGHT: 61 IN

## 2021-02-23 DIAGNOSIS — M17.0 BILATERAL PRIMARY OSTEOARTHRITIS OF KNEE: Primary | ICD-10-CM

## 2021-02-23 PROCEDURE — 20610 DRAIN/INJ JOINT/BURSA W/O US: CPT | Performed by: PHYSICIAN ASSISTANT

## 2021-02-23 NOTE — PROGRESS NOTES
Subjective;     64-year-old male patient with established osteoarthritis of the knees  He drives symptomatic improvement by Vis[employer] supplement administration  His product of choice is Synvisc 3 shot series  he presents the office today to receive the 2nd injection of his current 3 shot series both knees  Past Medical History:   Diagnosis Date    Acute non-recurrent maxillary sinusitis 4/15/2019    Angina effort     Anxiety     Roberts's esophagus     Bleeding from left ear 2019    Chest pain 2019    Chest pain, unspecified     Cough 2018    GERD (gastroesophageal reflux disease)     Hyperlipidemia     Hypertension     Impacted cerumen of right ear 2019    Migraine     Otorrhea, left 2019    SBO [facility]small bowel obstruction) ([facility] [address] ) 3/23/2019       Past Surgical History:   Procedure Laterality Date    CATARACT EXTRACTION      EYE SURGERY      HERNIA REPAIR Right        Family History   Problem Relation Age of Onset    No Known Problems Mother     Heart attack Father     Breast cancer Paternal Aunt        Social History     Tobacco Use    Smoking status: Former Smoker     Packs/day: 0 25     Years: 4 00     Pack years: 1 00     Types: Cigarettes     Quit date:      Years since quittin 1    Smokeless tobacco: Never Used   Substance Use Topics    Alcohol use: Yes     Frequency: 2-3 times a week     Drinks per session: 1 or 2     Binge frequency: Never    Drug use: Never      exam;     both knees are devoid of significant bruising redness or fluid distension and allow for safe administration of medication at today's appointment  A medial parapatellar tendon approach was utilized for today's injections both knees    Large joint arthrocentesis: R knee  Universal Protocol:  Consent: Verbal consent obtained    Time out: Immediately prior to procedure a "time out" was called to verify the correct patient, procedure, equipment, support staff and site/side marked as required  Procedure Details  Location: knee - R knee  Medications administered: 16 mg hylan 16 MG/2ML    Patient tolerance: patient tolerated the procedure well with no immediate complications  Dressing:  Sterile dressing applied    Large joint arthrocentesis: L knee  Procedure Details  Location: knee - L knee  Medications administered: 16 mg hylan 16 MG/2ML    Patient tolerance: patient tolerated the procedure well with no immediate complications  Dressing:  Sterile dressing applied         impression;     osteoarthritis both knees     plan;     he received injection 2  Of Synvisc to each knee  He returns next week for injection 3 of the 3 shot series  his entire experience was supervised by and plan formulated by the attending surgeon it was my privilege to assist him in the delivery of this gentleman's care

## 2021-02-24 ENCOUNTER — IMMUNIZATIONS (OUTPATIENT)
Dept: FAMILY MEDICINE CLINIC | Facility: HOSPITAL | Age: 71
End: 2021-02-24

## 2021-02-24 DIAGNOSIS — Z23 ENCOUNTER FOR IMMUNIZATION: Primary | ICD-10-CM

## 2021-02-24 PROCEDURE — 91300 SARS-COV-2 / COVID-19 MRNA VACCINE (PFIZER-BIONTECH) 30 MCG: CPT

## 2021-02-24 PROCEDURE — 0002A SARS-COV-2 / COVID-19 MRNA VACCINE (PFIZER-BIONTECH) 30 MCG: CPT

## 2021-03-02 ENCOUNTER — OFFICE VISIT (OUTPATIENT)
Dept: OBGYN CLINIC | Facility: HOSPITAL | Age: 71
End: 2021-03-02
Payer: MEDICARE

## 2021-03-02 VITALS
HEIGHT: 61 IN | SYSTOLIC BLOOD PRESSURE: 188 MMHG | DIASTOLIC BLOOD PRESSURE: 95 MMHG | BODY MASS INDEX: 27.59 KG/M2 | HEART RATE: 61 BPM

## 2021-03-02 DIAGNOSIS — M25.562 CHRONIC PAIN OF BOTH KNEES: ICD-10-CM

## 2021-03-02 DIAGNOSIS — M25.561 CHRONIC PAIN OF BOTH KNEES: ICD-10-CM

## 2021-03-02 DIAGNOSIS — G89.29 CHRONIC PAIN OF BOTH KNEES: ICD-10-CM

## 2021-03-02 DIAGNOSIS — M17.0 BILATERAL PRIMARY OSTEOARTHRITIS OF KNEE: Primary | ICD-10-CM

## 2021-03-02 PROCEDURE — 20610 DRAIN/INJ JOINT/BURSA W/O US: CPT | Performed by: ORTHOPAEDIC SURGERY

## 2021-03-02 NOTE — PROGRESS NOTES
Assessment:  1  Bilateral primary osteoarthritis of knee     2  Chronic pain of both knees         Plan:  The patient was provided with 3rd bilateral Synvisc injections  He tolerated the procedure well  He should follow up in 3 months  To do next visit:  Return in about 3 months (around 6/2/2021)  The above stated was discussed in layman's terms and the patient expressed understanding  All questions were answered to the patient's satisfaction  Scribe Attestation    I,:  Helen Patel am acting as a scribe while in the presence of the attending physician :       I,:  Feli Love MD personally performed the services described in this documentation    as scribed in my presence :             Subjective:   Nelson Larson is a 65 y o  male who presents for 3rdt bilateral Synvisc injections  Today he complains of bilateral generalized knee pain  The pain is described as an ache yet can be stabbing at times  Prolonged weight bearing and repetitive bending aggravates while rest alleviates  He does bike daily for exercise          Review of systems negative unless otherwise specified in HPI    Past Medical History:   Diagnosis Date    Acute non-recurrent maxillary sinusitis 4/15/2019    Angina effort     Anxiety     Roberts's esophagus     Bleeding from left ear 4/26/2019    Chest pain 11/7/2019    Chest pain, unspecified     Cough 2/13/2018    GERD (gastroesophageal reflux disease)     Hyperlipidemia     Hypertension     Impacted cerumen of right ear 4/25/2019    Migraine     Otorrhea, left 4/25/2019    SBO [facility]small bowel obstruction) ([facility] [address] ) 3/23/2019       Past Surgical History:   Procedure Laterality Date    CATARACT EXTRACTION      EYE SURGERY      HERNIA REPAIR Right 2014       Family History   Problem Relation Age of Onset    No Known Problems Mother     Heart attack Father     Breast cancer Paternal Aunt        Social History     Occupational History    Not on file   Tobacco Use    Smoking status: Former Smoker     Packs/day: 0 25     Years: 4 00     Pack years: 1 00     Types: Cigarettes     Quit date:      Years since quittin 1    Smokeless tobacco: Never Used   Substance and Sexual Activity    Alcohol use: Yes     Frequency: 2-3 times a week     Drinks per session: 1 or 2     Binge frequency: Never    Drug use: Never    Sexual activity: Not on file         Current Outpatient Medications:     aspirin 81 MG tablet, Take by mouth, Disp: , Rfl:     atorvastatin (LIPITOR) 10 mg tablet, Take 1 tablet (10 mg total) by mouth every other day, Disp: 45 tablet, Rfl: 3    esomeprazole (NexIUM) 20 mg capsule, Take 20 mg by mouth every morning before breakfast, Disp: , Rfl:     fluticasone (FLONASE) 50 mcg/act nasal spray, 1 spray into each nostril as needed , Disp: , Rfl:     latanoprost (XALATAN) 0 005 % ophthalmic solution, Administer 1 drop to both eyes , Disp: , Rfl:     levothyroxine 50 mcg tablet, Take 1 tablet (50 mcg total) by mouth daily Take on an empty stomach, Disp: 90 tablet, Rfl: 3    losartan (COZAAR) 100 MG tablet, Take 1 tablet (100 mg total) by mouth daily, Disp: 90 tablet, Rfl: 3    rizatriptan (MAXALT) 10 MG tablet, TAKE 1 TABLET BY MOUTH ONCE AS NEEDED FOR UP TO 1 DOSE (PATIENT TAKES THIS WHEN HAVING MIGRAINES), Disp: 9 tablet, Rfl: 3    timolol (TIMOPTIC) 0 5 % ophthalmic solution, INSTILL 1 DROP IN [facility] EYE TWO TIMES DAILY, Disp: , Rfl:     TIMOLOL MALEATE OP, Apply to eye, Disp: , Rfl:     TURMERIC PO, Take by mouth, Disp: , Rfl:     Allergies   Allergen Reactions    Influenza Vaccines Other (See Comments)     Flu like symptoms     Nitrous Oxide             Vitals:    21 1009   BP: (!) 188/95   Pulse: 61       Objective:  Physical exam  · General: Awake, Alert, Oriented  · Eyes: Pupils equal, round and reactive to light  · Heart: regular rate and rhythm  · Lungs: No audible wheezing  · Abdomen: soft                    Ortho Exam   Bilateral knee:  Well muscled thighs bilaterally   No erythema or ecchymosis  No effusion or swelling  Normal strength  Good ROM with crepitation   Calf compartments soft and supple  Sensation intact  Toes are warm sensate and mobile        Diagnostics, reviewed and taken today if performed as documented:    None performed    Procedures, if performed today:    Large joint arthrocentesis: R knee  Universal Protocol:  Consent: Verbal consent obtained  Risks and benefits: risks, benefits and alternatives were discussed  Consent given by: patient  Time out: Immediately prior to procedure a "time out" was called to verify the correct patient, procedure, equipment, support staff and site/side marked as required  Timeout called at: 3/2/2021 10:19 AM   Patient understanding: patient states understanding of the procedure being performed  Patient identity confirmed: verbally with patient    Supporting Documentation  Indications: pain   Procedure Details  Location: knee - R knee  Preparation: Patient was prepped and draped in the usual sterile fashion  Needle size: 22 G  Ultrasound guidance: no  Approach: anterolateral  Medications administered: 16 mg hylan 16 MG/2ML    Patient tolerance: patient tolerated the procedure well with no immediate complications  Dressing:  Sterile dressing applied    Large joint arthrocentesis: L knee  Universal Protocol:  Consent: Verbal consent obtained  Risks and benefits: risks, benefits and alternatives were discussed  Consent given by: patient  Time out: Immediately prior to procedure a "time out" was called to verify the correct patient, procedure, equipment, support staff and site/side marked as required    Timeout called at: 3/2/2021 10:20 AM   Patient understanding: patient states understanding of the procedure being performed  Patient identity confirmed: verbally with patient    Supporting Documentation  Indications: pain   Procedure Details  Location: knee - L knee  Preparation: Patient was prepped and draped in the usual sterile fashion  Needle size: 22 G  Ultrasound guidance: no  Approach: anterolateral  Medications administered: 16 mg hylan 16 MG/2ML    Patient tolerance: patient tolerated the procedure well with no immediate complications  Dressing:  Sterile dressing applied              Portions of the record may have been created with voice recognition software  Occasional wrong word or "sound a like" substitutions may have occurred due to the inherent limitations of voice recognition software  Read the chart carefully and recognize, using context, where substitutions have occurred

## 2021-03-25 ENCOUNTER — HOSPITAL ENCOUNTER (OUTPATIENT)
Dept: RADIOLOGY | Facility: HOSPITAL | Age: 71
Discharge: HOME | End: 2021-03-25
Payer: MEDICARE

## 2021-03-25 ENCOUNTER — TRANSCRIBE ORDERS (OUTPATIENT)
Dept: RADIOLOGY | Facility: HOSPITAL | Age: 71
End: 2021-03-25

## 2021-03-25 ENCOUNTER — OFFICE VISIT (OUTPATIENT)
Dept: OBGYN CLINIC | Facility: HOSPITAL | Age: 71
End: 2021-03-25
Payer: MEDICARE

## 2021-03-25 VITALS
HEART RATE: 55 BPM | SYSTOLIC BLOOD PRESSURE: 164 MMHG | BODY MASS INDEX: 27.59 KG/M2 | HEIGHT: 61 IN | DIASTOLIC BLOOD PRESSURE: 80 MMHG

## 2021-03-25 DIAGNOSIS — M25.562 ACUTE PAIN OF BOTH KNEES: Primary | ICD-10-CM

## 2021-03-25 DIAGNOSIS — S82.141A CLOSED FRACTURE OF RIGHT TIBIAL PLATEAU, INITIAL ENCOUNTER: ICD-10-CM

## 2021-03-25 DIAGNOSIS — M25.562 ACUTE PAIN OF BOTH KNEES: ICD-10-CM

## 2021-03-25 DIAGNOSIS — M25.561 ACUTE PAIN OF BOTH KNEES: ICD-10-CM

## 2021-03-25 DIAGNOSIS — M25.561 ACUTE PAIN OF BOTH KNEES: Primary | ICD-10-CM

## 2021-03-25 PROCEDURE — 73560 X-RAY EXAM OF KNEE 1 OR 2: CPT

## 2021-03-25 PROCEDURE — 99213 OFFICE O/P EST LOW 20 MIN: CPT | Performed by: PHYSICIAN ASSISTANT

## 2021-03-25 PROCEDURE — 73700 CT LOWER EXTREMITY W/O DYE: CPT
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
## 2021-03-26 ENCOUNTER — OFFICE VISIT (OUTPATIENT)
Dept: OBGYN CLINIC | Facility: MEDICAL CENTER | Age: 71
End: 2021-03-26
Payer: MEDICARE

## 2021-03-26 ENCOUNTER — APPOINTMENT (OUTPATIENT)
Dept: RADIOLOGY | Facility: MEDICAL CENTER | Age: 71
End: 2021-03-26
Payer: MEDICARE

## 2021-03-26 VITALS
SYSTOLIC BLOOD PRESSURE: 165 MMHG | HEIGHT: 61 IN | BODY MASS INDEX: 27.56 KG/M2 | HEART RATE: 57 BPM | DIASTOLIC BLOOD PRESSURE: 82 MMHG | WEIGHT: 146 LBS

## 2021-03-26 DIAGNOSIS — S82.141A CLOSED FRACTURE OF RIGHT TIBIAL PLATEAU, INITIAL ENCOUNTER: Primary | ICD-10-CM

## 2021-03-26 DIAGNOSIS — S82.141A CLOSED FRACTURE OF RIGHT TIBIAL PLATEAU, INITIAL ENCOUNTER: ICD-10-CM

## 2021-03-26 PROBLEM — M79.604 RIGHT LEG PAIN: Status: ACTIVE | Noted: 2021-03-26

## 2021-03-26 PROCEDURE — 99213 OFFICE O/P EST LOW 20 MIN: CPT | Performed by: ORTHOPAEDIC SURGERY

## 2021-03-26 PROCEDURE — 27530 TREAT KNEE FRACTURE: CPT | Performed by: ORTHOPAEDIC SURGERY

## 2021-03-26 PROCEDURE — 73590 X-RAY EXAM OF LOWER LEG: CPT
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
## 2021-03-31 ENCOUNTER — TELEPHONE (OUTPATIENT)
Dept: OBGYN CLINIC | Facility: HOSPITAL | Age: 71
End: 2021-03-31

## 2021-03-31 NOTE — TELEPHONE ENCOUNTER
Patient made aware  is in [address] [place] [address] currently  Patient is calling to report that he is having some calf swelling and he is concerned with a blood clot  He is not having any pain at rest, just when walking  Denies redness or hot to touch  He has sent a msg showing the calf area via [employer] msg x 3    Please advise  - Next FU 4/27

## 2021-03-31 NOTE — TELEPHONE ENCOUNTER
I spoke to patient an information above provided  Patient will continue to monitor for worsening of symptoms and call office should he have severe pain, increase in swelling, redness or tenderness to touch
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
## 2021-04-06 ENCOUNTER — OFFICE VISIT (OUTPATIENT)
Dept: CARDIOLOGY CLINIC | Facility: CLINIC | Age: 71
End: 2021-04-06
Payer: MEDICARE

## 2021-04-06 VITALS
HEART RATE: 72 BPM | WEIGHT: 155 LBS | DIASTOLIC BLOOD PRESSURE: 80 MMHG | HEIGHT: 61 IN | BODY MASS INDEX: 29.27 KG/M2 | OXYGEN SATURATION: 98 % | SYSTOLIC BLOOD PRESSURE: 160 MMHG

## 2021-04-06 DIAGNOSIS — I10 ESSENTIAL HYPERTENSION: Primary | ICD-10-CM

## 2021-04-06 DIAGNOSIS — F41.9 ANXIETY DISORDER, UNSPECIFIED TYPE: ICD-10-CM

## 2021-04-06 DIAGNOSIS — E78.2 MIXED HYPERLIPIDEMIA: ICD-10-CM

## 2021-04-06 PROCEDURE — 99214 OFFICE O/P EST MOD 30 MIN: CPT | Performed by: INTERNAL MEDICINE

## 2021-04-06 RX ORDER — AMLODIPINE BESYLATE 2.5 MG/1
2.5 TABLET ORAL DAILY
Qty: 30 TABLET | Refills: 6 | Status: SHIPPED | OUTPATIENT
Start: 2021-04-06 | End: 2021-07-31 | Stop reason: SDUPTHER
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
## 2021-04-06 NOTE — ASSESSMENT & PLAN NOTE
Hypertension, higher than desirable  Pain may be contributing to this phenomenon as well as increased level of anxiety  I will be starting the patient on amlodipine 2 5 mg daily and the patient was advised that he can take an extra dose of 2 5 mg on a p r n  Basis if the blood pressure is in excess of 160
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
## 2021-04-06 NOTE — PATIENT INSTRUCTIONS
The patient will be started on amlodipine 2 5 mg daily in addition to his current medications  The patient was advised that he may take an extra dose of amlodipine 2 5 mg on a p r n  Basis for elevated blood pressure over 683 systolic

## 2021-04-06 NOTE — ASSESSMENT & PLAN NOTE
Increase anxiety may be contributing to elevated blood pressure as well as atypical chest discomfort

## 2021-04-12 ENCOUNTER — TELEPHONE (OUTPATIENT)
Dept: CARDIOLOGY CLINIC | Facility: CLINIC | Age: 71
End: 2021-04-12
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
## 2021-04-21 DIAGNOSIS — Z09 FRACTURE FOLLOW-UP: Primary | ICD-10-CM

## 2021-04-27 ENCOUNTER — OFFICE VISIT (OUTPATIENT)
Dept: OBGYN CLINIC | Facility: HOSPITAL | Age: 71
End: 2021-04-27

## 2021-04-27 ENCOUNTER — HOSPITAL ENCOUNTER (OUTPATIENT)
Dept: RADIOLOGY | Facility: HOSPITAL | Age: 71
Discharge: HOME | End: 2021-04-27
Attending: ORTHOPAEDIC SURGERY
Payer: MEDICARE

## 2021-04-27 VITALS
SYSTOLIC BLOOD PRESSURE: 169 MMHG | DIASTOLIC BLOOD PRESSURE: 79 MMHG | BODY MASS INDEX: 29.29 KG/M2 | HEART RATE: 60 BPM | HEIGHT: 61 IN

## 2021-04-27 DIAGNOSIS — Z09 FRACTURE FOLLOW-UP: ICD-10-CM

## 2021-04-27 DIAGNOSIS — S82.121D CLOSED FRACTURE OF LATERAL PORTION OF RIGHT TIBIAL PLATEAU WITH ROUTINE HEALING, SUBSEQUENT ENCOUNTER: Primary | ICD-10-CM

## 2021-04-27 PROCEDURE — 99024 POSTOP FOLLOW-UP VISIT: CPT | Performed by: ORTHOPAEDIC SURGERY

## 2021-04-27 PROCEDURE — 73560 X-RAY EXAM OF KNEE 1 OR 2: CPT
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
## 2021-05-08 ENCOUNTER — APPOINTMENT (OUTPATIENT)
Dept: LAB | Age: 71
End: 2021-05-08
Payer: MEDICARE

## 2021-05-08 DIAGNOSIS — E78.2 MIXED HYPERLIPIDEMIA: ICD-10-CM

## 2021-05-08 DIAGNOSIS — E03.9 ACQUIRED HYPOTHYROIDISM: ICD-10-CM

## 2021-05-08 DIAGNOSIS — Z12.5 SCREENING FOR PROSTATE CANCER: ICD-10-CM

## 2021-05-08 DIAGNOSIS — I10 ESSENTIAL HYPERTENSION: ICD-10-CM

## 2021-05-08 LAB
ALBUMIN SERPL BCP-MCNC: 3.7 G/DL (ref 3.5–5)
ALP SERPL-CCNC: 65 U/L (ref 46–116)
ALT SERPL W P-5'-P-CCNC: 19 U/L (ref 12–78)
ANION GAP SERPL CALCULATED.3IONS-SCNC: 4 MMOL/L (ref 4–13)
AST SERPL W P-5'-P-CCNC: 18 U/L (ref 5–45)
BILIRUB SERPL-MCNC: 0.91 MG/DL (ref 0.2–1)
BUN SERPL-MCNC: 13 MG/DL (ref 5–25)
CALCIUM SERPL-MCNC: 9.2 MG/DL (ref 8.3–10.1)
CHLORIDE SERPL-SCNC: 106 MMOL/L (ref 100–108)
CHOLEST SERPL-MCNC: 186 MG/DL (ref 50–200)
CO2 SERPL-SCNC: 28 MMOL/L (ref 21–32)
CREAT SERPL-MCNC: 0.9 MG/DL (ref 0.6–1.3)
ERYTHROCYTE [DISTWIDTH] IN BLOOD BY AUTOMATED COUNT: 12.5 % (ref 11.6–15.1)
GFR SERPL CREATININE-BSD FRML MDRD: 85 ML/MIN/1.73SQ M
GLUCOSE P FAST SERPL-MCNC: 88 MG/DL (ref 65–99)
HCT VFR BLD AUTO: 46.3 % (ref 36.5–49.3)
HDLC SERPL-MCNC: 75 MG/DL
HGB BLD-MCNC: 15.7 G/DL (ref 12–17)
LDLC SERPL CALC-MCNC: 91 MG/DL (ref 0–100)
MCH RBC QN AUTO: 29.9 PG (ref 26.8–34.3)
MCHC RBC AUTO-ENTMCNC: 33.9 G/DL (ref 31.4–37.4)
MCV RBC AUTO: 88 FL (ref 82–98)
PLATELET # BLD AUTO: 197 THOUSANDS/UL (ref 149–390)
PMV BLD AUTO: 10.7 FL (ref 8.9–12.7)
POTASSIUM SERPL-SCNC: 4.1 MMOL/L (ref 3.5–5.3)
PROT SERPL-MCNC: 6.8 G/DL (ref 6.4–8.2)
PSA SERPL-MCNC: 3.2 NG/ML (ref 0–4)
RBC # BLD AUTO: 5.25 MILLION/UL (ref 3.88–5.62)
SODIUM SERPL-SCNC: 138 MMOL/L (ref 136–145)
TRIGL SERPL-MCNC: 102 MG/DL
TSH SERPL DL<=0.05 MIU/L-ACNC: 1.94 UIU/ML (ref 0.36–3.74)
WBC # BLD AUTO: 5.02 THOUSAND/UL (ref 4.31–10.16)

## 2021-05-08 PROCEDURE — 85027 COMPLETE CBC AUTOMATED: CPT

## 2021-05-08 PROCEDURE — 84443 ASSAY THYROID STIM HORMONE: CPT

## 2021-05-08 PROCEDURE — G0103 PSA SCREENING: HCPCS

## 2021-05-08 PROCEDURE — 80053 COMPREHEN METABOLIC PANEL: CPT

## 2021-05-08 PROCEDURE — 80061 LIPID PANEL: CPT

## 2021-05-08 PROCEDURE — 36415 COLL VENOUS BLD VENIPUNCTURE: CPT

## 2021-05-10 ENCOUNTER — OFFICE VISIT (OUTPATIENT)
Dept: FAMILY MEDICINE CLINIC | Facility: CLINIC | Age: 71
End: 2021-05-10
Payer: MEDICARE

## 2021-05-10 VITALS
HEART RATE: 60 BPM | DIASTOLIC BLOOD PRESSURE: 82 MMHG | WEIGHT: 147.8 LBS | OXYGEN SATURATION: 98 % | TEMPERATURE: 98.1 F | SYSTOLIC BLOOD PRESSURE: 138 MMHG | HEIGHT: 61 IN | RESPIRATION RATE: 16 BRPM | BODY MASS INDEX: 27.9 KG/M2

## 2021-05-10 DIAGNOSIS — I10 ESSENTIAL HYPERTENSION: Primary | ICD-10-CM

## 2021-05-10 DIAGNOSIS — R97.20 ELEVATED PSA: ICD-10-CM

## 2021-05-10 DIAGNOSIS — E78.2 MIXED HYPERLIPIDEMIA: ICD-10-CM

## 2021-05-10 DIAGNOSIS — E03.9 ACQUIRED HYPOTHYROIDISM: ICD-10-CM

## 2021-05-10 PROBLEM — H35.341 MACULAR HOLE, RIGHT EYE: Status: RESOLVED | Noted: 2019-01-28 | Resolved: 2021-05-10

## 2021-05-10 PROCEDURE — 99214 OFFICE O/P EST MOD 30 MIN: CPT | Performed by: FAMILY MEDICINE

## 2021-05-10 NOTE — PROGRESS NOTES
Assessment/Plan:    1  Essential hypertension  Assessment & Plan:  Stable on current dose  Dr Saman Funk added amlodipine 2 5mg    Orders:  -     CBC; Future; Expected date: 11/01/2021  -     Comprehensive metabolic panel; Future; Expected date: 11/01/2021    2  Acquired hypothyroidism  Assessment & Plan:  Stable on current dose of levothyroxine    Orders:  -     TSH, 3rd generation with Free T4 reflex; Future; Expected date: 11/01/2021    3  Mixed hyperlipidemia  Assessment & Plan:  Stable on lipitor    Orders:  -     Lipid Panel with Direct LDL reflex; Future; Expected date: 11/01/2021    4  Elevated PSA  -     PSA Total, Diagnostic; Future; Expected date: 11/01/2021    BMI Counseling: Body mass index is 27 93 kg/m²  The BMI is above normal  Nutrition recommendations include encouraging healthy choices of fruits and vegetables  Exercise recommendations include exercising 3-5 times per week  No pharmacotherapy was ordered  There are no Patient Instructions on file for this visit  No follow-ups on file  Subjective:      Patient ID: Zaida Maya is a 65 y o  male  Chief Complaint   Patient presents with    Follow-up     6 month f/u       Here for follow up  Recent fracture and healing  Able to bike for exercise  Pain with walking  Labs reviewed  PSA increased from 2 to 3 4, complaining of incomplete urinary bladder emptying- this has not changed for him over the last 10 years  Was scheduled with urology in May 2020 but was cancelled twiced    Hypertension  This is a chronic problem  The current episode started more than 1 year ago  The problem is unchanged  The problem is controlled  Associated symptoms include headaches (migrianes)  There are no associated agents to hypertension  Risk factors for coronary artery disease include dyslipidemia  Past treatments include angiotensin blockers  The current treatment provides significant improvement  There are no compliance problems      Hyperlipidemia  This is a chronic problem  The current episode started more than 1 year ago  The problem is controlled  Recent lipid tests were reviewed and are normal  Current antihyperlipidemic treatment includes statins  The current treatment provides significant improvement of lipids  There are no compliance problems  Risk factors for coronary artery disease include dyslipidemia and hypertension  The following portions of the patient's history were reviewed and updated as appropriate: allergies, current medications, past family history, past medical history, past social history, past surgical history and problem list     Review of Systems   Constitutional: Negative  HENT: Negative  Eyes: Negative  Respiratory: Negative  Cardiovascular: Negative  Gastrointestinal: Negative  Endocrine: Negative  Genitourinary:        Incompletel bladder emptying   Musculoskeletal: Negative  Skin: Negative  Allergic/Immunologic: Negative  Neurological: Positive for headaches (migrianes)  Hematological: Negative  Psychiatric/Behavioral: Negative            Current Outpatient Medications   Medication Sig Dispense Refill    amLODIPine (NORVASC) 2 5 mg tablet Take 1 tablet (2 5 mg total) by mouth daily 30 tablet 6    aspirin 81 MG tablet Take by mouth      atorvastatin (LIPITOR) 10 mg tablet Take 1 tablet (10 mg total) by mouth every other day 45 tablet 3    esomeprazole (NexIUM) 20 mg capsule Take 20 mg by mouth every other day       fluticasone (FLONASE) 50 mcg/act nasal spray 1 spray into each nostril as needed       latanoprost (XALATAN) 0 005 % ophthalmic solution Administer 1 drop to both eyes       levothyroxine 50 mcg tablet Take 1 tablet (50 mcg total) by mouth daily Take on an empty stomach 90 tablet 3    losartan (COZAAR) 100 MG tablet Take 1 tablet (100 mg total) by mouth daily 90 tablet 3    rizatriptan (MAXALT) 10 MG tablet TAKE 1 TABLET BY MOUTH ONCE AS NEEDED FOR UP TO 1 DOSE (PATIENT TAKES THIS WHEN HAVING MIGRAINES) 9 tablet 3    timolol (TIMOPTIC) 0 5 % ophthalmic solution INSTILL 1 DROP IN EACH EYE TWO TIMES DAILY      TIMOLOL MALEATE OP Apply to eye      TURMERIC PO Take by mouth       No current facility-administered medications for this visit  Objective:    /82   Pulse 60   Temp 98 1 °F (36 7 °C)   Resp 16   Ht 5' 1" (1 549 m)   Wt 67 kg (147 lb 12 8 oz)   SpO2 98%   BMI 27 93 kg/m²        Physical Exam  Vitals signs and nursing note reviewed  Constitutional:       Appearance: Normal appearance  HENT:      Head: Normocephalic and atraumatic  Eyes:      Extraocular Movements: Extraocular movements intact  Pupils: Pupils are equal, round, and reactive to light  Neck:      Musculoskeletal: Normal range of motion and neck supple  Cardiovascular:      Rate and Rhythm: Normal rate and regular rhythm  Pulses: Normal pulses  Heart sounds: Normal heart sounds  Pulmonary:      Effort: Pulmonary effort is normal       Breath sounds: Normal breath sounds  Abdominal:      General: Abdomen is flat  Palpations: Abdomen is soft  Musculoskeletal: Normal range of motion  Skin:     General: Skin is warm  Capillary Refill: Capillary refill takes less than 2 seconds  Neurological:      General: No focal deficit present  Mental Status: He is alert and oriented to person, place, and time  Psychiatric:         Mood and Affect: Mood normal          Behavior: Behavior normal          Thought Content:  Thought content normal          Judgment: Judgment normal                 Terri Stairs, DO

## 2021-06-09 ENCOUNTER — HOSPITAL ENCOUNTER (OUTPATIENT)
Dept: RADIOLOGY | Facility: HOSPITAL | Age: 71
Discharge: HOME | End: 2021-06-09
Attending: ORTHOPAEDIC SURGERY
Payer: MEDICARE

## 2021-06-09 ENCOUNTER — OFFICE VISIT (OUTPATIENT)
Dept: OBGYN CLINIC | Facility: HOSPITAL | Age: 71
End: 2021-06-09
Payer: MEDICARE

## 2021-06-09 VITALS
SYSTOLIC BLOOD PRESSURE: 139 MMHG | DIASTOLIC BLOOD PRESSURE: 75 MMHG | BODY MASS INDEX: 27.93 KG/M2 | HEART RATE: 62 BPM | HEIGHT: 61 IN

## 2021-06-09 DIAGNOSIS — Z09 FRACTURE FOLLOW-UP: ICD-10-CM

## 2021-06-09 DIAGNOSIS — M17.12 PRIMARY OSTEOARTHRITIS OF LEFT KNEE: ICD-10-CM

## 2021-06-09 DIAGNOSIS — Z09 FRACTURE FOLLOW-UP: Primary | ICD-10-CM

## 2021-06-09 PROCEDURE — 99212 OFFICE O/P EST SF 10 MIN: CPT | Performed by: ORTHOPAEDIC SURGERY

## 2021-06-09 PROCEDURE — 73560 X-RAY EXAM OF KNEE 1 OR 2: CPT

## 2021-06-09 PROCEDURE — 20610 DRAIN/INJ JOINT/BURSA W/O US: CPT | Performed by: ORTHOPAEDIC SURGERY

## 2021-06-09 RX ORDER — BUPIVACAINE HYDROCHLORIDE 2.5 MG/ML
2 INJECTION, SOLUTION INFILTRATION; PERINEURAL
Status: COMPLETED | OUTPATIENT
Start: 2021-06-09 | End: 2021-06-09

## 2021-06-09 RX ORDER — LIDOCAINE HYDROCHLORIDE 10 MG/ML
2 INJECTION, SOLUTION EPIDURAL; INFILTRATION; INTRACAUDAL; PERINEURAL
Status: COMPLETED | OUTPATIENT
Start: 2021-06-09 | End: 2021-06-09

## 2021-06-09 RX ORDER — BETAMETHASONE SODIUM PHOSPHATE AND BETAMETHASONE ACETATE 3; 3 MG/ML; MG/ML
6 INJECTION, SUSPENSION INTRA-ARTICULAR; INTRALESIONAL; INTRAMUSCULAR; SOFT TISSUE
Status: COMPLETED | OUTPATIENT
Start: 2021-06-09 | End: 2021-06-09

## 2021-06-09 RX ADMIN — LIDOCAINE HYDROCHLORIDE 2 ML: 10 INJECTION, SOLUTION EPIDURAL; INFILTRATION; INTRACAUDAL; PERINEURAL at 13:35

## 2021-06-09 RX ADMIN — BETAMETHASONE SODIUM PHOSPHATE AND BETAMETHASONE ACETATE 6 MG: 3; 3 INJECTION, SUSPENSION INTRA-ARTICULAR; INTRALESIONAL; INTRAMUSCULAR; SOFT TISSUE at 13:46

## 2021-06-09 RX ADMIN — BUPIVACAINE HYDROCHLORIDE 2 ML: 2.5 INJECTION, SOLUTION INFILTRATION; PERINEURAL at 13:46
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
## 2021-07-12 ENCOUNTER — TELEPHONE (OUTPATIENT)
Dept: CARDIOLOGY CLINIC | Facility: CLINIC | Age: 71
End: 2021-07-12

## 2021-07-12 DIAGNOSIS — R07.9 CHEST PAIN, UNSPECIFIED TYPE: Primary | ICD-10-CM

## 2021-07-12 NOTE — TELEPHONE ENCOUNTER
Discussed with the patient  Pain is fading away  Pain is atypical   Advised the patient that if the symptoms return that he should go to the emergency room  Meanwhile will arrange for the patient to have echocardiogram and nuclear stress test         ----- Message from Magdy Garibay sent at 7/12/2021 10:55 AM EDT -----  Regarding: Visit Follow-Up Question  Contact: [phone]  Please review  ----- Message -----  From: Natty Danielle  Sent: 7/12/2021  10:53 AM EDT  To: [facility]  Subject: Visit Follow-Up Question                         getting recurring chest pains, same as before but the last one was this morning woke me up at  5am which has never happened before  Toke 1 Amlodipine as prescribed, but the pain didn't totally go away and is still lingering (very lightly now)  my bp  7/11/21  135-79, pulse 61  7/12/21 (after Amlodipine)  119-67, pulse 60  And my watch says my ECG (2 points) is normal    Called your, office tried to explain it to two people who transferred me,  and then the computer transferred me back to the starting message and while I don't think this is an emergency I was hoping somebody will listen and tell me if this is more than just an inconvenience!

## 2021-07-30 ENCOUNTER — HOSPITAL ENCOUNTER (OUTPATIENT)
Dept: NON INVASIVE DIAGNOSTICS | Facility: CLINIC | Age: 71
Discharge: HOME | End: 2021-07-30
Payer: MEDICARE

## 2021-07-30 DIAGNOSIS — R07.9 CHEST PAIN, UNSPECIFIED TYPE: ICD-10-CM

## 2021-07-30 PROCEDURE — 78452 HT MUSCLE IMAGE SPECT MULT: CPT | Performed by: INTERNAL MEDICINE

## 2021-07-30 PROCEDURE — 93017 CV STRESS TEST TRACING ONLY: CPT

## 2021-07-30 PROCEDURE — A9502 TC99M TETROFOSMIN: HCPCS

## 2021-07-30 PROCEDURE — 93356 MYOCRD STRAIN IMG SPCKL TRCK: CPT

## 2021-07-30 PROCEDURE — 93306 TTE W/DOPPLER COMPLETE: CPT

## 2021-07-30 PROCEDURE — 93016 CV STRESS TEST SUPVJ ONLY: CPT | Performed by: INTERNAL MEDICINE

## 2021-07-30 PROCEDURE — 93306 TTE W/DOPPLER COMPLETE: CPT | Performed by: INTERNAL MEDICINE

## 2021-07-30 PROCEDURE — 93018 CV STRESS TEST I&R ONLY: CPT | Performed by: INTERNAL MEDICINE

## 2021-07-30 PROCEDURE — G1004 CDSM NDSC: HCPCS

## 2021-07-30 PROCEDURE — 78452 HT MUSCLE IMAGE SPECT MULT: CPT

## 2021-08-09 PROCEDURE — U0005 INFEC AGEN DETEC AMPLI PROBE: HCPCS | Performed by: FAMILY MEDICINE

## 2021-08-09 PROCEDURE — U0003 INFECTIOUS AGENT DETECTION BY NUCLEIC ACID (DNA OR RNA); SEVERE ACUTE RESPIRATORY SYNDROME CORONAVIRUS 2 (SARS-COV-2) (CORONAVIRUS DISEASE [COVID-19]), AMPLIFIED PROBE TECHNIQUE, MAKING USE OF HIGH THROUGHPUT TECHNOLOGIES AS DESCRIBED BY CMS-2020-01-R: HCPCS | Performed by: FAMILY MEDICINE

## 2021-09-29 ENCOUNTER — OFFICE VISIT (OUTPATIENT)
Dept: OBGYN CLINIC | Facility: HOSPITAL | Age: 71
End: 2021-09-29
Payer: MEDICARE

## 2021-09-29 VITALS
HEART RATE: 65 BPM | SYSTOLIC BLOOD PRESSURE: 145 MMHG | WEIGHT: 146 LBS | DIASTOLIC BLOOD PRESSURE: 84 MMHG | BODY MASS INDEX: 27.56 KG/M2 | HEIGHT: 61 IN

## 2021-09-29 DIAGNOSIS — Z09 FRACTURE FOLLOW-UP: Primary | ICD-10-CM

## 2021-09-29 DIAGNOSIS — M70.50 PES ANSERINE BURSITIS: ICD-10-CM

## 2021-09-29 DIAGNOSIS — S82.121D CLOSED FRACTURE OF LATERAL PORTION OF RIGHT TIBIAL PLATEAU WITH ROUTINE HEALING, SUBSEQUENT ENCOUNTER: ICD-10-CM

## 2021-09-29 PROCEDURE — 20610 DRAIN/INJ JOINT/BURSA W/O US: CPT | Performed by: ORTHOPAEDIC SURGERY

## 2021-09-29 PROCEDURE — 99213 OFFICE O/P EST LOW 20 MIN: CPT | Performed by: ORTHOPAEDIC SURGERY

## 2021-09-29 RX ORDER — BETAMETHASONE SODIUM PHOSPHATE AND BETAMETHASONE ACETATE 3; 3 MG/ML; MG/ML
12 INJECTION, SUSPENSION INTRA-ARTICULAR; INTRALESIONAL; INTRAMUSCULAR; SOFT TISSUE
Status: COMPLETED | OUTPATIENT
Start: 2021-09-29 | End: 2021-09-29

## 2021-09-29 RX ORDER — LIDOCAINE HYDROCHLORIDE 10 MG/ML
2 INJECTION, SOLUTION INFILTRATION; PERINEURAL
Status: COMPLETED | OUTPATIENT
Start: 2021-09-29 | End: 2021-09-29

## 2021-09-29 RX ORDER — BUPIVACAINE HYDROCHLORIDE 2.5 MG/ML
2 INJECTION, SOLUTION INFILTRATION; PERINEURAL
Status: COMPLETED | OUTPATIENT
Start: 2021-09-29 | End: 2021-09-29

## 2021-09-29 RX ADMIN — LIDOCAINE HYDROCHLORIDE 2 ML: 10 INJECTION, SOLUTION INFILTRATION; PERINEURAL at 13:38

## 2021-09-29 RX ADMIN — BUPIVACAINE HYDROCHLORIDE 2 ML: 2.5 INJECTION, SOLUTION INFILTRATION; PERINEURAL at 13:38

## 2021-09-29 RX ADMIN — BETAMETHASONE SODIUM PHOSPHATE AND BETAMETHASONE ACETATE 12 MG: 3; 3 INJECTION, SUSPENSION INTRA-ARTICULAR; INTRALESIONAL; INTRAMUSCULAR; SOFT TISSUE at 13:38
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
## 2021-09-30 ENCOUNTER — PROBLEM (OUTPATIENT)
Dept: URBAN - METROPOLITAN AREA CLINIC 6 | Facility: CLINIC | Age: 71
End: 2021-09-30

## 2021-09-30 ENCOUNTER — PREPPED CHART (OUTPATIENT)
Dept: URBAN - METROPOLITAN AREA CLINIC 6 | Facility: CLINIC | Age: 71
End: 2021-09-30

## 2021-09-30 DIAGNOSIS — H40.1132: ICD-10-CM

## 2021-09-30 DIAGNOSIS — H40.053: ICD-10-CM

## 2021-09-30 DIAGNOSIS — H25.12: ICD-10-CM

## 2021-09-30 PROCEDURE — 92012 INTRM OPH EXAM EST PATIENT: CPT

## 2021-09-30 PROCEDURE — G8427 DOCREV CUR MEDS BY ELIG CLIN: HCPCS

## 2021-09-30 PROCEDURE — 1036F TOBACCO NON-USER: CPT

## 2021-09-30 ASSESSMENT — VISUAL ACUITY
OD_SC: 20/40-2
OS_CC: 20/200
OS_PH: 20/30-2
OD_PH: 20/30-2

## 2021-09-30 ASSESSMENT — TONOMETRY
OS_IOP_MMHG: 16
OD_IOP_MMHG: 17

## 2021-10-04 ENCOUNTER — OFFICE VISIT (OUTPATIENT)
Dept: FAMILY MEDICINE CLINIC | Facility: CLINIC | Age: 71
End: 2021-10-04
Payer: MEDICARE

## 2021-10-04 VITALS
HEIGHT: 61 IN | TEMPERATURE: 97.3 F | WEIGHT: 143.6 LBS | OXYGEN SATURATION: 98 % | BODY MASS INDEX: 27.11 KG/M2 | DIASTOLIC BLOOD PRESSURE: 85 MMHG | RESPIRATION RATE: 16 BRPM | SYSTOLIC BLOOD PRESSURE: 125 MMHG | HEART RATE: 51 BPM

## 2021-10-04 DIAGNOSIS — I10 ESSENTIAL HYPERTENSION: ICD-10-CM

## 2021-10-04 DIAGNOSIS — E78.2 MIXED HYPERLIPIDEMIA: ICD-10-CM

## 2021-10-04 DIAGNOSIS — E03.9 ACQUIRED HYPOTHYROIDISM: Primary | ICD-10-CM

## 2021-10-04 PROBLEM — F32.A MILD DEPRESSION: Status: RESOLVED | Noted: 2019-11-26 | Resolved: 2021-10-04

## 2021-10-04 PROBLEM — M79.604 RIGHT LEG PAIN: Status: RESOLVED | Noted: 2021-03-26 | Resolved: 2021-10-04

## 2021-10-04 PROCEDURE — 99214 OFFICE O/P EST MOD 30 MIN: CPT | Performed by: FAMILY MEDICINE

## 2021-10-09 ENCOUNTER — IMMUNIZATIONS (OUTPATIENT)
Dept: FAMILY MEDICINE CLINIC | Facility: HOSPITAL | Age: 71
End: 2021-10-09

## 2021-10-09 DIAGNOSIS — Z23 ENCOUNTER FOR IMMUNIZATION: Primary | ICD-10-CM

## 2021-10-09 PROCEDURE — 91300 SARS-COV-2 / COVID-19 MRNA VACCINE (PFIZER-BIONTECH) 30 MCG: CPT

## 2021-10-09 PROCEDURE — 0001A SARS-COV-2 / COVID-19 MRNA VACCINE (PFIZER-BIONTECH) 30 MCG: CPT

## 2021-10-12 DIAGNOSIS — E78.2 MIXED HYPERLIPIDEMIA: ICD-10-CM

## 2021-10-12 RX ORDER — ATORVASTATIN CALCIUM 10 MG/1
10 TABLET, FILM COATED ORAL EVERY OTHER DAY
Qty: 45 TABLET | Refills: 3 | Status: SHIPPED | OUTPATIENT
Start: 2021-10-12

## 2021-10-19 ENCOUNTER — OFFICE VISIT (OUTPATIENT)
Dept: CARDIOLOGY CLINIC | Facility: CLINIC | Age: 71
End: 2021-10-19
Payer: MEDICARE

## 2021-10-19 VITALS
DIASTOLIC BLOOD PRESSURE: 68 MMHG | HEART RATE: 60 BPM | OXYGEN SATURATION: 98 % | HEIGHT: 61 IN | BODY MASS INDEX: 27.38 KG/M2 | WEIGHT: 145 LBS | SYSTOLIC BLOOD PRESSURE: 124 MMHG

## 2021-10-19 DIAGNOSIS — E78.2 MIXED HYPERLIPIDEMIA: ICD-10-CM

## 2021-10-19 DIAGNOSIS — I10 ESSENTIAL HYPERTENSION: Primary | ICD-10-CM

## 2021-10-19 DIAGNOSIS — F41.9 ANXIETY DISORDER, UNSPECIFIED TYPE: ICD-10-CM

## 2021-10-19 PROCEDURE — 99214 OFFICE O/P EST MOD 30 MIN: CPT | Performed by: INTERNAL MEDICINE

## 2021-11-10 ENCOUNTER — IOP CHECK (OUTPATIENT)
Dept: URBAN - METROPOLITAN AREA CLINIC 6 | Facility: CLINIC | Age: 71
End: 2021-11-10

## 2021-11-10 DIAGNOSIS — Z96.1: ICD-10-CM

## 2021-11-10 DIAGNOSIS — H25.12: ICD-10-CM

## 2021-11-10 DIAGNOSIS — H40.1132: ICD-10-CM

## 2021-11-10 DIAGNOSIS — H40.053: ICD-10-CM

## 2021-11-10 DIAGNOSIS — H35.341: ICD-10-CM

## 2021-11-10 PROCEDURE — 1036F TOBACCO NON-USER: CPT

## 2021-11-10 PROCEDURE — 92012 INTRM OPH EXAM EST PATIENT: CPT

## 2021-11-10 PROCEDURE — G8428 CUR MEDS NOT DOCUMENT: HCPCS

## 2021-11-10 ASSESSMENT — VISUAL ACUITY
OS_CC: 20/200
OS_GLARE: 20/400
OD_CC: 20/30

## 2021-11-10 ASSESSMENT — TONOMETRY
OD_IOP_MMHG: 20
OS_IOP_MMHG: 20
OS_IOP_MMHG: 21
OD_IOP_MMHG: 22

## 2021-11-22 ENCOUNTER — ESTABLISHED COMPREHENSIVE EXAM (OUTPATIENT)
Dept: URBAN - METROPOLITAN AREA CLINIC 6 | Facility: CLINIC | Age: 71
End: 2021-11-22

## 2021-11-22 DIAGNOSIS — H35.341: ICD-10-CM

## 2021-11-22 DIAGNOSIS — H25.12: ICD-10-CM

## 2021-11-22 DIAGNOSIS — H40.1132: ICD-10-CM

## 2021-11-22 PROCEDURE — 92136 OPHTHALMIC BIOMETRY: CPT

## 2021-11-22 PROCEDURE — G8427 DOCREV CUR MEDS BY ELIG CLIN: HCPCS

## 2021-11-22 PROCEDURE — 92012 INTRM OPH EXAM EST PATIENT: CPT

## 2021-11-22 PROCEDURE — 92133 CPTRZD OPH DX IMG PST SGM ON: CPT

## 2021-11-22 PROCEDURE — 1036F TOBACCO NON-USER: CPT

## 2021-11-22 PROCEDURE — 92134 CPTRZ OPH DX IMG PST SGM RTA: CPT

## 2021-11-22 ASSESSMENT — KERATOMETRY
OS_K1POWER_DIOPTERS: 42.25
OS_K2POWER_DIOPTERS: 43.25
OD_K1POWER_DIOPTERS: 42.00
OD_K2POWER_DIOPTERS: 42.50
OS_AXISANGLE2_DEGREES: 75
OD_AXISANGLE_DEGREES: 030
OS_AXISANGLE_DEGREES: 165
OD_AXISANGLE2_DEGREES: 120

## 2021-11-22 ASSESSMENT — VISUAL ACUITY
OU_CC: J1+
OS_CC: 20/200
OD_CC: 20/20-2
OS_PH: 20/40-2

## 2021-11-22 ASSESSMENT — TONOMETRY
OD_IOP_MMHG: 17
OS_IOP_MMHG: 19

## 2021-11-29 ENCOUNTER — PATIENT MESSAGE (OUTPATIENT)
Dept: FAMILY MEDICINE CLINIC | Facility: CLINIC | Age: 71
End: 2021-11-29

## 2021-11-30 ENCOUNTER — TELEPHONE (OUTPATIENT)
Dept: FAMILY MEDICINE CLINIC | Facility: CLINIC | Age: 71
End: 2021-11-30

## 2021-12-01 ENCOUNTER — APPOINTMENT (OUTPATIENT)
Dept: LAB | Age: 71
End: 2021-12-01
Payer: MEDICARE

## 2021-12-01 DIAGNOSIS — E78.2 MIXED HYPERLIPIDEMIA: ICD-10-CM

## 2021-12-01 DIAGNOSIS — E03.9 ACQUIRED HYPOTHYROIDISM: ICD-10-CM

## 2021-12-01 DIAGNOSIS — R97.20 ELEVATED PSA: ICD-10-CM

## 2021-12-01 DIAGNOSIS — I10 ESSENTIAL HYPERTENSION: ICD-10-CM

## 2021-12-01 LAB
ALBUMIN SERPL BCP-MCNC: 3.3 G/DL (ref 3.5–5)
ALP SERPL-CCNC: 56 U/L (ref 46–116)
ALT SERPL W P-5'-P-CCNC: 20 U/L (ref 12–78)
ANION GAP SERPL CALCULATED.3IONS-SCNC: 3 MMOL/L (ref 4–13)
AST SERPL W P-5'-P-CCNC: 24 U/L (ref 5–45)
BILIRUB SERPL-MCNC: 1.15 MG/DL (ref 0.2–1)
BUN SERPL-MCNC: 17 MG/DL (ref 5–25)
CALCIUM ALBUM COR SERPL-MCNC: 9.3 MG/DL (ref 8.3–10.1)
CALCIUM SERPL-MCNC: 8.7 MG/DL (ref 8.3–10.1)
CHLORIDE SERPL-SCNC: 106 MMOL/L (ref 100–108)
CHOLEST SERPL-MCNC: 198 MG/DL
CO2 SERPL-SCNC: 30 MMOL/L (ref 21–32)
CREAT SERPL-MCNC: 1.1 MG/DL (ref 0.6–1.3)
ERYTHROCYTE [DISTWIDTH] IN BLOOD BY AUTOMATED COUNT: 13 % (ref 11.6–15.1)
GFR SERPL CREATININE-BSD FRML MDRD: 66 ML/MIN/1.73SQ M
GLUCOSE P FAST SERPL-MCNC: 91 MG/DL (ref 65–99)
HCT VFR BLD AUTO: 48 % (ref 36.5–49.3)
HDLC SERPL-MCNC: 69 MG/DL
HGB BLD-MCNC: 16.1 G/DL (ref 12–17)
LDLC SERPL CALC-MCNC: 104 MG/DL (ref 0–100)
MCH RBC QN AUTO: 29.9 PG (ref 26.8–34.3)
MCHC RBC AUTO-ENTMCNC: 33.5 G/DL (ref 31.4–37.4)
MCV RBC AUTO: 89 FL (ref 82–98)
PLATELET # BLD AUTO: 163 THOUSANDS/UL (ref 149–390)
PMV BLD AUTO: 11.2 FL (ref 8.9–12.7)
POTASSIUM SERPL-SCNC: 4 MMOL/L (ref 3.5–5.3)
PROT SERPL-MCNC: 6.5 G/DL (ref 6.4–8.2)
PSA SERPL-MCNC: 2 NG/ML (ref 0–4)
RBC # BLD AUTO: 5.38 MILLION/UL (ref 3.88–5.62)
SODIUM SERPL-SCNC: 139 MMOL/L (ref 136–145)
TRIGL SERPL-MCNC: 127 MG/DL
TSH SERPL DL<=0.05 MIU/L-ACNC: 3.26 UIU/ML (ref 0.36–3.74)
WBC # BLD AUTO: 5.54 THOUSAND/UL (ref 4.31–10.16)

## 2021-12-01 PROCEDURE — 80061 LIPID PANEL: CPT

## 2021-12-01 PROCEDURE — 36415 COLL VENOUS BLD VENIPUNCTURE: CPT

## 2021-12-01 PROCEDURE — 84443 ASSAY THYROID STIM HORMONE: CPT

## 2021-12-01 PROCEDURE — 84153 ASSAY OF PSA TOTAL: CPT

## 2021-12-01 PROCEDURE — 80053 COMPREHEN METABOLIC PANEL: CPT

## 2021-12-01 PROCEDURE — 85027 COMPLETE CBC AUTOMATED: CPT

## 2021-12-14 ENCOUNTER — OFFICE VISIT (OUTPATIENT)
Dept: FAMILY MEDICINE CLINIC | Facility: CLINIC | Age: 71
End: 2021-12-14
Payer: MEDICARE

## 2021-12-14 VITALS
TEMPERATURE: 97.6 F | WEIGHT: 143.2 LBS | SYSTOLIC BLOOD PRESSURE: 130 MMHG | HEIGHT: 61 IN | RESPIRATION RATE: 16 BRPM | OXYGEN SATURATION: 99 % | BODY MASS INDEX: 27.03 KG/M2 | DIASTOLIC BLOOD PRESSURE: 80 MMHG | HEART RATE: 94 BPM

## 2021-12-14 DIAGNOSIS — H26.9 CATARACT OF LEFT EYE, UNSPECIFIED CATARACT TYPE: ICD-10-CM

## 2021-12-14 DIAGNOSIS — Z00.00 MEDICARE ANNUAL WELLNESS VISIT, SUBSEQUENT: Primary | ICD-10-CM

## 2021-12-14 DIAGNOSIS — Z13.6 ENCOUNTER FOR SCREENING FOR CARDIOVASCULAR DISORDERS: ICD-10-CM

## 2021-12-14 PROBLEM — F41.9 ANXIETY DISORDER: Status: RESOLVED | Noted: 2021-04-06 | Resolved: 2021-12-14

## 2021-12-14 PROBLEM — R20.0 BILATERAL HAND NUMBNESS: Status: RESOLVED | Noted: 2020-07-21 | Resolved: 2021-12-14

## 2021-12-14 PROCEDURE — G0439 PPPS, SUBSEQ VISIT: HCPCS | Performed by: FAMILY MEDICINE

## 2021-12-14 PROCEDURE — 1123F ACP DISCUSS/DSCN MKR DOCD: CPT | Performed by: FAMILY MEDICINE

## 2021-12-20 ENCOUNTER — TELEPHONE (OUTPATIENT)
Dept: OTHER | Facility: OTHER | Age: 71
End: 2021-12-20

## 2021-12-20 DIAGNOSIS — G44.001 INTRACTABLE CLUSTER HEADACHE SYNDROME, UNSPECIFIED CHRONICITY PATTERN: ICD-10-CM

## 2021-12-21 ENCOUNTER — SURGERY/PROCEDURE (OUTPATIENT)
Dept: URBAN - METROPOLITAN AREA SURGICAL CENTER 6 | Facility: SURGICAL CENTER | Age: 71
End: 2021-12-21

## 2021-12-21 DIAGNOSIS — H25.12: ICD-10-CM

## 2021-12-21 PROCEDURE — 0365T INSERTION OF DEXTENZA: CPT

## 2021-12-21 PROCEDURE — MISCFEMTO FEMTO

## 2021-12-21 PROCEDURE — 66984 XCAPSL CTRC RMVL W/O ECP: CPT

## 2021-12-21 RX ORDER — RIZATRIPTAN BENZOATE 10 MG/1
10 TABLET ORAL ONCE AS NEEDED
Qty: 9 TABLET | Refills: 0 | Status: SHIPPED | OUTPATIENT
Start: 2021-12-21 | End: 2022-06-12

## 2021-12-22 ENCOUNTER — 1 DAY POST-OP (OUTPATIENT)
Dept: URBAN - METROPOLITAN AREA CLINIC 6 | Facility: CLINIC | Age: 71
End: 2021-12-22

## 2021-12-22 DIAGNOSIS — Z96.1: ICD-10-CM

## 2021-12-22 PROCEDURE — 99024 POSTOP FOLLOW-UP VISIT: CPT

## 2021-12-22 PROCEDURE — G8427 DOCREV CUR MEDS BY ELIG CLIN: HCPCS

## 2021-12-22 PROCEDURE — 1036F TOBACCO NON-USER: CPT

## 2021-12-22 ASSESSMENT — KERATOMETRY
OS_AXISANGLE_DEGREES: 165
OS_K1POWER_DIOPTERS: 42.25
OD_AXISANGLE_DEGREES: 030
OS_AXISANGLE2_DEGREES: 75
OD_K2POWER_DIOPTERS: 42.50
OD_K1POWER_DIOPTERS: 42.00
OS_K2POWER_DIOPTERS: 43.25
OD_AXISANGLE2_DEGREES: 120

## 2021-12-22 ASSESSMENT — VISUAL ACUITY
OS_OTHER: 1 DAY POST OP
OS_SC: 20/20

## 2021-12-22 ASSESSMENT — TONOMETRY: OS_IOP_MMHG: 20

## 2021-12-28 ASSESSMENT — KERATOMETRY
OD_K1POWER_DIOPTERS: 42.00
OD_AXISANGLE_DEGREES: 030
OS_AXISANGLE2_DEGREES: 75
OS_K2POWER_DIOPTERS: 43.25
OS_K1POWER_DIOPTERS: 42.25
OD_AXISANGLE2_DEGREES: 120
OD_K2POWER_DIOPTERS: 42.50
OS_AXISANGLE_DEGREES: 165

## 2021-12-29 ENCOUNTER — 1 WEEK POST-OP (OUTPATIENT)
Dept: URBAN - METROPOLITAN AREA CLINIC 6 | Facility: CLINIC | Age: 71
End: 2021-12-29

## 2021-12-29 DIAGNOSIS — Z96.1: ICD-10-CM

## 2021-12-29 PROCEDURE — G8427 DOCREV CUR MEDS BY ELIG CLIN: HCPCS

## 2021-12-29 PROCEDURE — 1036F TOBACCO NON-USER: CPT

## 2021-12-29 PROCEDURE — 99024 POSTOP FOLLOW-UP VISIT: CPT

## 2021-12-29 ASSESSMENT — KERATOMETRY
OS_AXISANGLE_DEGREES: 165
OD_K2POWER_DIOPTERS: 42.50
OS_AXISANGLE2_DEGREES: 75
OS_K2POWER_DIOPTERS: 43.25
OD_AXISANGLE_DEGREES: 030
OD_K1POWER_DIOPTERS: 42.00
OS_K1POWER_DIOPTERS: 42.25
OD_AXISANGLE2_DEGREES: 120

## 2021-12-29 ASSESSMENT — VISUAL ACUITY
OS_SC: 20/20-1
OD_SC: 20/30+1

## 2022-01-03 ENCOUNTER — HOSPITAL ENCOUNTER (OUTPATIENT)
Dept: NON INVASIVE DIAGNOSTICS | Facility: CLINIC | Age: 72
Discharge: HOME | End: 2022-01-03
Payer: MEDICARE

## 2022-01-03 DIAGNOSIS — Z13.6 ENCOUNTER FOR SCREENING FOR CARDIOVASCULAR DISORDERS: ICD-10-CM

## 2022-01-03 PROCEDURE — 93978 VASCULAR STUDY: CPT | Performed by: SURGERY

## 2022-01-03 PROCEDURE — 93922 UPR/L XTREMITY ART 2 LEVELS: CPT | Performed by: SURGERY

## 2022-01-03 PROCEDURE — 93922 UPR/L XTREMITY ART 2 LEVELS: CPT

## 2022-01-03 PROCEDURE — 93880 EXTRACRANIAL BILAT STUDY: CPT | Performed by: SURGERY

## 2022-01-12 ENCOUNTER — HOSPITAL ENCOUNTER (OUTPATIENT)
Dept: RADIOLOGY | Facility: HOSPITAL | Age: 72
Discharge: HOME | End: 2022-01-12
Attending: ORTHOPAEDIC SURGERY
Payer: MEDICARE

## 2022-01-12 ENCOUNTER — OFFICE VISIT (OUTPATIENT)
Dept: OBGYN CLINIC | Facility: HOSPITAL | Age: 72
End: 2022-01-12
Payer: MEDICARE

## 2022-01-12 VITALS
HEIGHT: 61 IN | BODY MASS INDEX: 26.76 KG/M2 | HEART RATE: 54 BPM | SYSTOLIC BLOOD PRESSURE: 170 MMHG | DIASTOLIC BLOOD PRESSURE: 95 MMHG

## 2022-01-12 DIAGNOSIS — M17.11 PRIMARY OSTEOARTHRITIS OF RIGHT KNEE: ICD-10-CM

## 2022-01-12 DIAGNOSIS — M70.50 PES ANSERINE BURSITIS: Primary | ICD-10-CM

## 2022-01-12 DIAGNOSIS — Z09 FRACTURE FOLLOW-UP: ICD-10-CM

## 2022-01-12 PROCEDURE — 20610 DRAIN/INJ JOINT/BURSA W/O US: CPT | Performed by: ORTHOPAEDIC SURGERY

## 2022-01-12 PROCEDURE — 99213 OFFICE O/P EST LOW 20 MIN: CPT | Performed by: ORTHOPAEDIC SURGERY

## 2022-01-12 PROCEDURE — 73560 X-RAY EXAM OF KNEE 1 OR 2: CPT

## 2022-01-12 RX ORDER — LIDOCAINE HYDROCHLORIDE 10 MG/ML
2 INJECTION, SOLUTION INFILTRATION; PERINEURAL
Status: COMPLETED | OUTPATIENT
Start: 2022-01-12 | End: 2022-01-12

## 2022-01-12 RX ORDER — CLINDAMYCIN HYDROCHLORIDE 150 MG/1
CAPSULE ORAL
COMMUNITY
Start: 2021-11-29

## 2022-01-12 RX ORDER — BUPIVACAINE HYDROCHLORIDE 2.5 MG/ML
2 INJECTION, SOLUTION INFILTRATION; PERINEURAL
Status: COMPLETED | OUTPATIENT
Start: 2022-01-12 | End: 2022-01-12

## 2022-01-12 RX ORDER — OFLOXACIN 3 MG/ML
SOLUTION/ DROPS OPHTHALMIC
COMMUNITY
Start: 2021-12-24

## 2022-01-12 RX ORDER — BETAMETHASONE SODIUM PHOSPHATE AND BETAMETHASONE ACETATE 3; 3 MG/ML; MG/ML
12 INJECTION, SUSPENSION INTRA-ARTICULAR; INTRALESIONAL; INTRAMUSCULAR; SOFT TISSUE
Status: COMPLETED | OUTPATIENT
Start: 2022-01-12 | End: 2022-01-12

## 2022-01-12 RX ORDER — KETOROLAC TROMETHAMINE 5 MG/ML
SOLUTION OPHTHALMIC
COMMUNITY
Start: 2021-12-24

## 2022-01-12 RX ADMIN — BETAMETHASONE SODIUM PHOSPHATE AND BETAMETHASONE ACETATE 12 MG: 3; 3 INJECTION, SUSPENSION INTRA-ARTICULAR; INTRALESIONAL; INTRAMUSCULAR; SOFT TISSUE at 14:03

## 2022-01-12 RX ADMIN — BUPIVACAINE HYDROCHLORIDE 2 ML: 2.5 INJECTION, SOLUTION INFILTRATION; PERINEURAL at 14:03

## 2022-01-12 RX ADMIN — LIDOCAINE HYDROCHLORIDE 2 ML: 10 INJECTION, SOLUTION INFILTRATION; PERINEURAL at 14:03

## 2022-01-12 NOTE — PROGRESS NOTES
Assessment:  1  Pes anserine bursitis  Large joint arthrocentesis: R pes anserine bursa   2  Primary osteoarthritis of right knee  Large joint arthrocentesis: R pes anserine bursa       Plan:  Ashely Bowie is a pleasant 66year old who presents to the office today for a follow-up evaluation of his right knee  He underwent a pes anserinus bursa corticosteroid injection on 09/29/2021 which provided him with pain relief  A repeat right knee pes anserine bursa corticosteroid injection was offered to the patient at today's visit  We discussed the risks and benefits of corticosteroid injection today in the office and he did elect to proceed  The right knee pes anserine bursa corticosteroid injection was administered without issue and well tolerated by the patient  This was done under sterile technique  Post injection instructions were provided  He understands can be repeated no sooner than three months  I will follow-up with him in 3 months for a clinical re-evaluation  He understood and had no further questions  To do next visit:  Return in about 3 months (around 4/12/2022)  The above stated was discussed in layman's terms and the patient expressed understanding  All questions were answered to the patient's satisfaction  Scribe Attestation    I,:  Suzanne Kirby am acting as a scribe while in the presence of the attending physician :       I,:  Princess Reilly MD personally performed the services described in this documentation    as scribed in my presence :             Subjective:   Raheel Cary is a 66 y o  male who presents to the office today for a follow-up evaluation of his right knee  He underwent a pes anserinus bursa corticosteroid injection on 09/29/2021 and was provided with 3 months of pain relief  He states that he is an avid biker and tries to ride his bike daily  He states that he will experience pain over the medial aspect of his right knee along with his hamstring and calf    He states that he will experience daily intermittent dull pain  He states that his pain is exacerbated with ambulating up and down stairs  Review of systems negative unless otherwise specified in HPI    Past Medical History:   Diagnosis Date    Acute non-recurrent maxillary sinusitis 4/15/2019    Angina effort     Anxiety     Roberts's esophagus     Bleeding from left ear 2019    Chest pain 2019    Chest pain, unspecified     Cough 2018    GERD (gastroesophageal reflux disease)     Hyperlipidemia     Hypertension     Impacted cerumen of right ear 2019    Macular hole, right eye 2019    Last Assessment & Plan:  Impression:  1  Macular hole, right eye (dx 2018 ~295 microns, now 391 micron) 2  PP OD (10/2018) 3  Cataract OS   Plan: - Second opinion  Lives in [place] with plan for surgery at [address] [place] [address] getting the surgery as the best way to [facility]    Migraine     Mild depression ([address] ) 2019    Otorrhea, left 2019    SBO (small bowel obstruction) ([address] ) 3/23/2019       Past Surgical History:   Procedure Laterality Date    CATARACT EXTRACTION      EYE SURGERY      HERNIA REPAIR Right        Family History   Problem Relation Age of Onset    No Known Problems Mother     Heart attack Father     Breast cancer Paternal Aunt        Social History     Occupational History    Not on file   Tobacco Use    Smoking status: Former Smoker     Packs/day: 0 25     Years: 4 00     Pack years: 1 00     Types: Cigarettes     Quit date:      Years since quittin 0    Smokeless tobacco: Never Used   Substance and Sexual Activity    Alcohol use:  Yes    Drug use: Never    Sexual activity: Not on file         Current Outpatient Medications:     aspirin 81 MG tablet, Take by mouth, Disp: , Rfl:     atorvastatin (LIPITOR) 10 mg tablet, Take 1 tablet (10 mg total) by mouth every other day, Disp: 45 tablet, Rfl: 3    clindamycin (CLEOCIN) 150 mg capsule, TAKE 1 CAPSULE BY MOUTH FOUR TIMES DAILY, Disp: , Rfl:     esomeprazole (NexIUM) 20 mg capsule, Take 20 mg by mouth every other day , Disp: , Rfl:     ketorolac (ACULAR) 0 5 % ophthalmic solution, INSTILL 1 DROP IN THE LEFT EYE FOUR TIMES DAILY START 1 DAY PRIOR TO SURGERY, Disp: , Rfl:     latanoprost (XALATAN) 0 005 % ophthalmic solution, Administer 1 drop to both eyes , Disp: , Rfl:     levothyroxine 50 mcg tablet, Take 1 tablet (50 mcg total) by mouth daily Take on an empty stomach, Disp: 90 tablet, Rfl: 3    losartan (COZAAR) 100 MG tablet, Take 1 tablet (100 mg total) by mouth daily, Disp: 90 tablet, Rfl: 3    ofloxacin (OCUFLOX) 0 3 % ophthalmic solution, INSTILL 1 DROP IN THE LEFT EYE FOUR TIMES DAILY START 1 DAY PRIOR TO SURGERY, Disp: , Rfl:     rizatriptan (MAXALT) 10 MG tablet, Take 1 tablet (10 mg total) by mouth once as needed for migraine for up to 1 dose May repeat in 2 hours if needed, Disp: 9 tablet, Rfl: 0    timolol (TIMOPTIC) 0 5 % ophthalmic solution, INSTILL 1 DROP IN [facility] EYE TWO TIMES DAILY, Disp: , Rfl:     Allergies   Allergen Reactions    Clindamycin GI Intolerance     Per Patient    Influenza Vaccines Other (See Comments)     Flu like symptoms     Nitrous Oxide             Vitals:    01/12/22 1342   BP: 170/95   Pulse: (!) 54       Objective:                    Right Knee Exam     Tenderness   The patient is experiencing tenderness in the medial joint line and pes anserinus      Range of Motion   Extension: 0   Flexion: 120     Tests   Varus: negative Valgus: negative    Other   Erythema: absent  Scars: absent  Sensation: normal  Pulse: present  Swelling: none  Effusion: no effusion present    Comments:  No erythema, warmth or signs of infections  Parapatellar crepitus appreciated  Knee is stable on ligamentous exam at 0, 30, 90 degrees  Neurovascularly intact               Diagnostics, reviewed and taken today if performed as documented:    X-ray's performed in the office today of his right knee demonstrates mild medial and patellofemoral compartmental osteoarthritis  There is evidence of joint space narrowing  There are no acute fractures, dislocations, lytic or blastic lesions  The attending physician has personally reviewed the pertinent films in PACS and interpretation is as follows:      Procedures, if performed today:    Large joint arthrocentesis: R pes anserine bursa  Universal Protocol:  Consent: Verbal consent obtained  Risks and benefits: risks, benefits and alternatives were discussed  Consent given by: patient  Time out: Immediately prior to procedure a "time out" was called to verify the correct patient, procedure, equipment, support staff and site/side marked as required  Timeout called at: 1/12/2022 2:01 PM   Site marked: the operative site was marked  Patient identity confirmed: verbally with patient    Supporting Documentation  Indications: pain   Procedure Details  Location: knee - R pes anserine bursa  Preparation: Patient was prepped and draped in the usual sterile fashion  Needle size: 22 G  Ultrasound guidance: no  Approach: anterolateral  Medications administered: 2 mL bupivacaine 0 25 %; 2 mL lidocaine 1 %; 12 mg betamethasone acetate-betamethasone sodium phosphate 6 (3-3) mg/mL    Patient tolerance: patient tolerated the procedure well with no immediate complications  Dressing:  Sterile dressing applied          Portions of the record may have been created with voice recognition software  Occasional wrong word or "sound a like" substitutions may have occurred due to the inherent limitations of voice recognition software  Read the chart carefully and recognize, using context, where substitutions have occurred

## 2022-01-19 ENCOUNTER — POST-OP CHECK (OUTPATIENT)
Dept: URBAN - METROPOLITAN AREA CLINIC 6 | Facility: CLINIC | Age: 72
End: 2022-01-19

## 2022-01-19 DIAGNOSIS — Z96.1: ICD-10-CM

## 2022-01-19 DIAGNOSIS — H40.1132: ICD-10-CM

## 2022-01-19 PROCEDURE — 99024 POSTOP FOLLOW-UP VISIT: CPT

## 2022-01-19 ASSESSMENT — KERATOMETRY
OS_AXISANGLE2_DEGREES: 75
OS_K1POWER_DIOPTERS: 42.25
OD_AXISANGLE2_DEGREES: 120
OS_AXISANGLE_DEGREES: 165
OD_K2POWER_DIOPTERS: 42.50
OS_K2POWER_DIOPTERS: 43.25
OD_K1POWER_DIOPTERS: 42.00
OD_AXISANGLE_DEGREES: 030

## 2022-01-19 ASSESSMENT — VISUAL ACUITY
OD_SC: 20/40
OD_PH: 20/30
OS_SC: 20/20

## 2022-01-19 ASSESSMENT — TONOMETRY
OS_IOP_MMHG: 19
OD_IOP_MMHG: 23

## 2022-01-24 ENCOUNTER — AMB VIDEO VISIT (OUTPATIENT)
Dept: OTHER | Facility: HOSPITAL | Age: 72
End: 2022-01-24

## 2022-01-24 DIAGNOSIS — R51.9 NONINTRACTABLE HEADACHE, UNSPECIFIED CHRONICITY PATTERN, UNSPECIFIED HEADACHE TYPE: Primary | ICD-10-CM

## 2022-01-24 PROCEDURE — ECARE PR SL URGENT CARE VIRTUAL VISIT: Performed by: NURSE PRACTITIONER

## 2022-01-24 RX ORDER — FLUTICASONE PROPIONATE 50 MCG
1 SPRAY, SUSPENSION (ML) NASAL DAILY
Qty: 16 G | Refills: 0 | Status: SHIPPED | OUTPATIENT
Start: 2022-01-24 | End: 2022-05-06 | Stop reason: SDUPTHER

## 2022-01-24 NOTE — PATIENT INSTRUCTIONS
Symptoms are suspicious for covid  Isolate until results are obtained  Start flonase and continue antibiotic as discussed  Will test for covid 19  Rest and drink extra fluids  Tylenol as needed for pain  Go to ER with any worsening symptoms, chest pain, sob, difficulty breathing, lethargy, confusion, dehyrdration, persistent fever 103 or higher  Please Be Courteous:  You are being tested for novel coronavirus (COVID-19) your test is pending at this time  You need to self quarantine at least until you have the results back  This means go home and stay home  Have someone else  your medications for you and bring them to you and drop them off at your door  Tests typically come back in 1-3 days  Results can be found in the "COVID-19" section of your [employer] account  In Your Home:  If you live with other people, trying to avoid common spaces and disinfect areas that you come into contact with  Per the CDC's recommendations: persons who suspect that they might have COVID-19 should isolate, stay at home, and use a separate bedroom and bathroom if feasible  Isolation should begin even before seeking testing and before test results become available  All household members should start wearing a mask in the home, particularly in shared spaces where appropriate distancing is not possible  Take Care of Yourself:  Try to sleep on your stomach as much as possible  If you have the ability to, take vitamin D3 2000 IU by mouth daily, vitamin-C 1000 mg by mouth twice a day, a multivitamin daily to help boost your immune system  You should check your temperature twice day  Return to the emergency department for any severe shortness of breath or pulse ox less than 90%  If You Test Positive for COVID-19 (Isolate)     Everyone, regardless of vaccination status:     · Stay home for 5 days  · If you have no symptoms or your symptoms are resolving after 5 days, you can leave your house    · Continue to wear a mask around others for 5 additional days  If you have a fever, continue to stay home until your fever resolves  If You Were Exposed to Someone with COVID-19 (Quarantine)  If you:  Have been boosted  OR  Completed the primary series of Pfizer or Moderna vaccine within the last 6 months  OR  Completed the primary series of J&J vaccine within the last 2 months     · Wear a mask around others for 10 days  · Test on day 5, if possible  If you develop symptoms get a test and stay home  If you:  Completed the primary series of Pfizer or Moderna vaccine over 6 months ago and are not boosted  OR  Completed the primary series of J&J over 2 months ago and are not boosted  OR  Are unvaccinated     · Stay home for 5 days  After that continue to wear a mask around others for 5 additional days  · If you cant quarantine you must wear a mask for 10 days  · Test on day 5 if possible       If you develop symptoms get a test and stay home

## 2022-01-24 NOTE — CARE ANYWHERE EVISITS
Visit Summary for [facility] - Gender: Male - Date of Birth: [id number]  Date: [id number] - Duration: 12 minutes  Patient: [facility]  Provider: Amy GALVEZ    Patient Contact Information  Address  [address]  [place]  [phone]    Visit Topics  Fever [Added By: Self - 2022-01-24]  Earache [Added By: Self - 2022-01-24]  Headache [Added By: Self - 2022-01-24]  Sinus pressure  [Added By: Self - [id number]]    Triage Questions   What is your current physical address in the event of a medical emergency? Answer []  Are you allergic to any medications? Answer []  Are you now or could you be pregnant? Answer []  Do you have any immune system compromise or chronic lung   disease? Answer []  Do you have any vulnerable family members in the home (infant, pregnant, cancer, elderly)? Answer []     Conversation Transcripts  [0A][0A] [Notification] You are connected with Nayeli Cheema, Urgent Care Specialist [0A][Notification] Judy Santos is located in [place]  [0A][Notification] Judy Santos has shared health history  Curtis Owen  [0A]    Diagnosis  Headache    Procedures  Value: [id number] Code: CPT-4 UNLISTED E&M SERVICE    Medications Prescribed    No prescriptions ordered    Electronically signed by: Nayeli Ortiz(NPI [id number])

## 2022-01-24 NOTE — PROGRESS NOTES
Video Visit - Tomi Ann 66 y o  male MRN: [id number]    REQUIRED DOCUMENTATION:         1  This service was provided via [email or web address]  2  Provider located at [address] [place], [place] [id number]  3  [facility] provider: LEONOR Dean  4  Identify all parties in room with patient during [facility] visit:  Patient   5  After connecting through [employer], patient was identified by name and date of birth  Patient was then informed that this was a Telemedicine visit and that the exam was being conducted confidentially over secure lines  My office door was closed  No one else was in the room  Patient acknowledged consent and understanding of privacy and security of the Telemedicine visit  I informed the patient that I have reviewed their record in Epic and presented the opportunity for them to ask any questions regarding the visit today  The patient agreed to participate  This is a 66year old male here today for telemedicine visit with complaints of headache  Patient was uanble to allow access for his camera  He was able to see but I was unable to see him  He wanted to procede with visit  Symptoms started about 4-5 days ago  He states it feels like sinus pressure  He did call his PCP and antibiotic was prescribed  He states today he feels slightly lightheaded  He states today he had fever of 101  No body aches but did have chills last night  He has some decreased appetite and nausea  No vomiting  He denies neck pain or stiffness  He did have Covid 19 vaccine and booster  Grandson currently has a runny nose  No other sick contacts  Physical Exam  Vitals reviewed: uanble to see patient during visit  Constitutional:       Appearance: Normal appearance  He is not toxic-appearing  Pulmonary:      Comments: No cough or audible wheezing during visit  Neurological:      Mental Status: He is alert     Psychiatric:         Mood and Affect: Mood normal  Behavior: Behavior normal          Thought Content: Thought content normal          Judgment: Judgment normal        Diagnoses and all orders for this visit:    Nonintractable headache, unspecified chronicity pattern, unspecified headache type  -     [facility]  -     fluticasone (FLONASE) 50 mcg/act nasal spray; 1 spray into each nostril daily for 7 days      Patient Instructions        Symptoms are suspicious for covid  Isolate until results are obtained  Start flonase and continue antibiotic as discussed  Will test for covid 19  Rest and drink extra fluids  Tylenol as needed for pain  Go to ER with any worsening symptoms, chest pain, sob, difficulty breathing, lethargy, confusion, dehyrdration, persistent fever 103 or higher  Please Be Courteous:  You are being tested for novel coronavirus (COVID-19) your test is pending at this time  You need to self quarantine at least until you have the results back  This means go home and stay home  Have someone else  your medications for you and bring them to you and drop them off at your door  Tests typically come back in 1-3 days  Results can be found in the "COVID-19" section of your MyChart account  In Your Home:  If you live with other people, trying to avoid common spaces and disinfect areas that you come into contact with  Per the CDC's recommendations: persons who suspect that they might have COVID-19 should isolate, stay at home, and use a separate bedroom and bathroom if feasible  Isolation should begin even before seeking testing and before test results become available  All household members should start wearing a mask in the home, particularly in shared spaces where appropriate distancing is not possible  Take Care of Yourself:  Try to sleep on your stomach as much as possible    If you have the ability to, take vitamin D3 2000 IU by mouth daily, vitamin-C 1000 mg by mouth twice a day, a multivitamin daily to help boost your immune system  You should check your temperature twice day  Return to the emergency department for any severe shortness of breath or pulse ox less than 90%  If You Test Positive for COVID-19 (Isolate)     Everyone, regardless of vaccination status:     · Stay home for 5 days  · If you have no symptoms or your symptoms are resolving after 5 days, you can leave your house  · Continue to wear a mask around others for 5 additional days  If you have a fever, continue to stay home until your fever resolves  If You Were Exposed to Someone with COVID-19 (Quarantine)  If you:  Have been boosted  OR  Completed the primary series of Pfizer or Moderna vaccine within the last 6 months  OR  Completed the primary series of J&J vaccine within the last 2 months     · Wear a mask around others for 10 days  · Test on day 5, if possible  If you develop symptoms get a test and stay home  If you:  Completed the primary series of Pfizer or Moderna vaccine over 6 months ago and are not boosted  OR  Completed the primary series of J&J over 2 months ago and are not boosted  OR  Are unvaccinated     · Stay home for 5 days  After that continue to wear a mask around others for 5 additional days  · If you cant quarantine you must wear a mask for 10 days  · Test on day 5 if possible  If you develop symptoms get a test and stay home           Follow up with PCP if not improved, if symptoms are worse, go to the ER

## 2022-01-25 PROCEDURE — U0005 INFEC AGEN DETEC AMPLI PROBE: HCPCS | Performed by: NURSE PRACTITIONER

## 2022-01-25 PROCEDURE — U0003 INFECTIOUS AGENT DETECTION BY NUCLEIC ACID (DNA OR RNA); SEVERE ACUTE RESPIRATORY SYNDROME CORONAVIRUS 2 (SARS-COV-2) (CORONAVIRUS DISEASE [COVID-19]), AMPLIFIED PROBE TECHNIQUE, MAKING USE OF HIGH THROUGHPUT TECHNOLOGIES AS DESCRIBED BY CMS-2020-01-R: HCPCS | Performed by: NURSE PRACTITIONER

## 2022-01-27 ENCOUNTER — OFFICE VISIT (OUTPATIENT)
Dept: FAMILY MEDICINE CLINIC | Facility: CLINIC | Age: 72
End: 2022-01-27
Payer: MEDICARE

## 2022-01-27 VITALS
TEMPERATURE: 99.2 F | WEIGHT: 145.2 LBS | BODY MASS INDEX: 27.41 KG/M2 | DIASTOLIC BLOOD PRESSURE: 80 MMHG | SYSTOLIC BLOOD PRESSURE: 140 MMHG | HEART RATE: 80 BPM | OXYGEN SATURATION: 98 % | RESPIRATION RATE: 16 BRPM | HEIGHT: 61 IN

## 2022-01-27 DIAGNOSIS — J01.00 ACUTE NON-RECURRENT MAXILLARY SINUSITIS: Primary | ICD-10-CM

## 2022-01-27 PROCEDURE — 99213 OFFICE O/P EST LOW 20 MIN: CPT | Performed by: FAMILY MEDICINE

## 2022-01-27 RX ORDER — BRINZOLAMIDE 10 MG/ML
SUSPENSION/ DROPS OPHTHALMIC
COMMUNITY
Start: 2022-01-26

## 2022-01-27 NOTE — PROGRESS NOTES
Assessment/Plan:    1  Acute non-recurrent maxillary sinusitis  Assessment & Plan:  Slowly improving  Cont augmentin  Cont fever reducers      BMI Counseling: Body mass index is 27 13 kg/m²  The BMI is above normal  Nutrition recommendations include encouraging healthy choices of fruits and vegetables  Exercise recommendations include exercising 3-5 times per week  No pharmacotherapy was ordered  Rationale for BMI follow-up plan is due to patient being overweight or obese  Depression Screening and Follow-up Plan: Patient was screened for depression during today's encounter  They screened negative with a PHQ-2 score of 0  There are no Patient Instructions on file for this visit  No follow-ups on file  Subjective:      Patient ID: Laura Glynn is a 66 y o  male  Chief Complaint   Patient presents with    Sinusitis     Patient here with head pressure ear pain        Started with sinus pressure on 1/18/2022  Fever 102 for the past 2 days  Ear pain  Sharp pain in head  No cough  No sore  2 days ago covid test negative  No other sick contacts at home  Day 6 of antibiotic  Taking tylenol        Sinus Problem  This is a new problem  The current episode started in the past 7 days  The problem has been gradually improving since onset  The maximum temperature recorded prior to his arrival was 101 - 101 9 F  Associated symptoms include congestion, ear pain, headaches and sinus pressure  Pertinent negatives include no coughing  Past treatments include antibiotics  The treatment provided mild relief  The following portions of the patient's history were reviewed and updated as appropriate: allergies, current medications, past family history, past medical history, past social history, past surgical history and problem list     Review of Systems   Constitutional: Negative  HENT: Positive for congestion, ear pain and sinus pressure  Eyes: Negative  Respiratory: Negative for cough  Cardiovascular: Negative  Gastrointestinal: Negative  Endocrine: Negative  Genitourinary: Negative  Musculoskeletal: Negative  Allergic/Immunologic: Negative  Neurological: Positive for headaches  Hematological: Negative  Psychiatric/Behavioral: Negative  Current Outpatient Medications   Medication Sig Dispense Refill    amoxicillin-clavulanate (AUGMENTIN) 875-125 mg per tablet Take 1 tablet by mouth every 12 (twelve) hours for 10 days 20 tablet 0    aspirin 81 MG tablet Take by mouth      atorvastatin (LIPITOR) 10 mg tablet Take 1 tablet (10 mg total) by mouth every other day 45 tablet 3    esomeprazole (NexIUM) 20 mg capsule Take 20 mg by mouth every other day       fluticasone (FLONASE) 50 mcg/act nasal spray 1 spray into each nostril daily for 7 days 16 g 0    ketorolac (ACULAR) 0 5 % ophthalmic solution INSTILL 1 DROP IN THE LEFT EYE FOUR TIMES DAILY START 1 DAY PRIOR TO SURGERY      latanoprost (XALATAN) 0 005 % ophthalmic solution Administer 1 drop to both eyes       levothyroxine 50 mcg tablet Take 1 tablet (50 mcg total) by mouth daily Take on an empty stomach 90 tablet 3    losartan (COZAAR) 100 MG tablet Take 1 tablet (100 mg total) by mouth daily 90 tablet 3    ofloxacin (OCUFLOX) 0 3 % ophthalmic solution INSTILL 1 DROP IN THE LEFT EYE FOUR TIMES DAILY START 1 DAY PRIOR TO SURGERY      rizatriptan (MAXALT) 10 MG tablet Take 1 tablet (10 mg total) by mouth once as needed for migraine for up to 1 dose May repeat in 2 hours if needed 9 tablet 0    timolol (TIMOPTIC) 0 5 % ophthalmic solution INSTILL 1 DROP IN EACH EYE TWO TIMES DAILY      brinzolamide (AZOPT) 1 % ophthalmic suspension       clindamycin (CLEOCIN) 150 mg capsule TAKE 1 CAPSULE BY MOUTH FOUR TIMES DAILY (Patient not taking: Reported on 1/24/2022)       No current facility-administered medications for this visit         Objective:    /80 (BP Location: Left arm, Patient Position: Sitting, Cuff Size: Standard)   Pulse 80   Temp 99 2 °F (37 3 °C)   Resp 16   Ht 5' 1 34" (1 558 m)   Wt 65 9 kg (145 lb 3 2 oz)   SpO2 98%   BMI 27 13 kg/m²        Physical Exam  Vitals and nursing note reviewed  Constitutional:       Appearance: Normal appearance  HENT:      Head: Normocephalic and atraumatic  Right Ear: Tympanic membrane, ear canal and external ear normal       Left Ear: Tympanic membrane, ear canal and external ear normal    Eyes:      Extraocular Movements: Extraocular movements intact  Pupils: Pupils are equal, round, and reactive to light  Cardiovascular:      Rate and Rhythm: Normal rate and regular rhythm  Pulses: Normal pulses  Heart sounds: Normal heart sounds  Pulmonary:      Effort: Pulmonary effort is normal       Breath sounds: Normal breath sounds  Abdominal:      General: Abdomen is flat  Palpations: Abdomen is soft  Musculoskeletal:         General: Normal range of motion  Cervical back: Normal range of motion and neck supple  Skin:     General: Skin is warm  Capillary Refill: Capillary refill takes less than 2 seconds  Neurological:      General: No focal deficit present  Mental Status: He is alert and oriented to person, place, and time  Psychiatric:         Mood and Affect: Mood normal          Behavior: Behavior normal          Thought Content:  Thought content normal          Judgment: Judgment normal                 Keke Marie DO

## 2022-01-28 ENCOUNTER — VBI (OUTPATIENT)
Dept: ADMINISTRATIVE | Facility: OTHER | Age: 72
End: 2022-01-28

## 2022-02-16 ENCOUNTER — POST-OP CHECK (OUTPATIENT)
Dept: URBAN - METROPOLITAN AREA CLINIC 6 | Facility: CLINIC | Age: 72
End: 2022-02-16

## 2022-02-16 DIAGNOSIS — Z96.1: ICD-10-CM

## 2022-02-16 PROCEDURE — 99024 POSTOP FOLLOW-UP VISIT: CPT

## 2022-02-16 ASSESSMENT — KERATOMETRY
OD_AXISANGLE2_DEGREES: 120
OD_AXISANGLE_DEGREES: 030
OS_K1POWER_DIOPTERS: 42.25
OD_K1POWER_DIOPTERS: 42.00
OS_AXISANGLE_DEGREES: 165
OS_K2POWER_DIOPTERS: 43.25
OS_AXISANGLE2_DEGREES: 75
OD_K2POWER_DIOPTERS: 42.50

## 2022-02-16 ASSESSMENT — TONOMETRY
OD_IOP_MMHG: 20
OS_IOP_MMHG: 19
OD_IOP_MMHG: 21

## 2022-02-16 ASSESSMENT — VISUAL ACUITY
OD_SC: 20/40+1
OS_SC: 20/20
OD_PH: 20/30+2

## 2022-03-28 DIAGNOSIS — E03.9 ACQUIRED HYPOTHYROIDISM: ICD-10-CM

## 2022-03-28 RX ORDER — LEVOTHYROXINE SODIUM 0.05 MG/1
50 TABLET ORAL DAILY
Qty: 90 TABLET | Refills: 3 | Status: SHIPPED | OUTPATIENT
Start: 2022-03-28

## 2022-04-19 ENCOUNTER — OFFICE VISIT (OUTPATIENT)
Dept: CARDIOLOGY CLINIC | Facility: CLINIC | Age: 72
End: 2022-04-19
Payer: MEDICARE

## 2022-04-19 VITALS
DIASTOLIC BLOOD PRESSURE: 70 MMHG | WEIGHT: 143 LBS | BODY MASS INDEX: 27 KG/M2 | HEIGHT: 61 IN | OXYGEN SATURATION: 98 % | HEART RATE: 70 BPM | SYSTOLIC BLOOD PRESSURE: 110 MMHG

## 2022-04-19 DIAGNOSIS — F41.9 ANXIETY: ICD-10-CM

## 2022-04-19 DIAGNOSIS — I10 ESSENTIAL HYPERTENSION: Primary | ICD-10-CM

## 2022-04-19 DIAGNOSIS — E78.2 MIXED HYPERLIPIDEMIA: ICD-10-CM

## 2022-04-19 PROCEDURE — 99214 OFFICE O/P EST MOD 30 MIN: CPT | Performed by: INTERNAL MEDICINE

## 2022-04-19 NOTE — PROGRESS NOTES
Assessment/Plan:    Anxiety    History of anxiety disorder, stable  Mixed hyperlipidemia    Hyperlipidemia, stable  The patient will atorvastatin at 10 mg   Every other day  Essential hypertension    Hypertension, stable and adequately controlled  We will continue present regimen  Diagnoses and all orders for this visit:    Essential hypertension    Mixed hyperlipidemia    Anxiety          Subjective:   Feels generally well  Patient ID: Maco Rao is a 66 y o  male  The patient presented to this office for the purpose of cardiac follow-up  He is known to have history of hypertension hyperlipidemia as well anxiety disorder and GERD  The patient has been feeling well  He has mild dyspnea on exertion yet he is able to bike for 10-15 miles on most days without significant difficulties  He denies any symptoms of chest pain  He has no symptoms palpitation, dizziness or lightheadedness  He has no leg edema  The following portions of the patient's history were reviewed and updated as appropriate: allergies, current medications, past family history, past medical history, past social history, past surgical history and problem list     Review of Systems   Respiratory: Negative for apnea, cough, chest tightness, shortness of breath and wheezing  Cardiovascular: Negative for chest pain, palpitations and leg swelling  Gastrointestinal: Negative for abdominal pain  Neurological: Negative for dizziness and light-headedness  Psychiatric/Behavioral: Negative  Objective:  Stable cardiac-wise  /70 (BP Location: Left arm, Patient Position: Sitting, Cuff Size: Adult)   Pulse 70   Ht 5' 1" (1 549 m)   Wt 64 9 kg (143 lb)   SpO2 98%   BMI 27 02 kg/m²          Physical Exam  Vitals reviewed  Constitutional:       General: He is not in acute distress  Appearance: He is well-developed  He is not diaphoretic  HENT:      Head: Normocephalic     Eyes:      Pupils: Pupils are equal, round, and reactive to light  Neck:      Thyroid: No thyromegaly  Vascular: No JVD  Cardiovascular:      Rate and Rhythm: Normal rate and regular rhythm  Heart sounds: S1 normal and S2 normal  No murmur heard  No friction rub  No gallop  Pulmonary:      Effort: Pulmonary effort is normal  No respiratory distress  Breath sounds: Normal breath sounds  No wheezing or rales  Chest:      Chest wall: No tenderness  Abdominal:      Palpations: Abdomen is soft  Musculoskeletal:         General: No tenderness or deformity  Normal range of motion  Cervical back: Normal range of motion  Right lower leg: No edema  Left lower leg: No edema  Skin:     General: Skin is warm and dry  Neurological:      Mental Status: He is alert and oriented to person, place, and time     Psychiatric:         Mood and Affect: Mood normal          Behavior: Behavior normal

## 2022-04-19 NOTE — LETTER
April 19, 2022     Idania Scherer, [address] [address]    Patient: Miah Stein   YOB: 1950   Date of Visit: 4/19/2022       Dear Dr Stephany Crews:    Thank you for referring Miah Stein to me for evaluation  Below are my notes for this consultation  If you have questions, please do not hesitate to call me  I look forward to following your patient along with you  Sincerely,        Andrew Wise MD        CC: No Recipients  Andrew Wise MD  4/19/2022  9:14 AM  Sign when Signing Visit  Assessment/Plan:    Anxiety    History of anxiety disorder, stable  Mixed hyperlipidemia    Hyperlipidemia, stable  The patient will atorvastatin at 10 mg   Every other day  Essential hypertension    Hypertension, stable and adequately controlled  We will continue present regimen  Diagnoses and all orders for this visit:    Essential hypertension    Mixed hyperlipidemia    Anxiety          Subjective:   Feels generally well  Patient ID: Miah Stein is a 66 y o  male  The patient presented to this office for the purpose of cardiac follow-up  He is known to have history of hypertension hyperlipidemia as well anxiety disorder and GERD  The patient has been feeling well  He has mild dyspnea on exertion yet he is able to bike for 10-15 miles on most days without significant difficulties  He denies any symptoms of chest pain  He has no symptoms palpitation, dizziness or lightheadedness  He has no leg edema  The following portions of the patient's history were reviewed and updated as appropriate: allergies, current medications, past family history, past medical history, past social history, past surgical history and problem list     Review of Systems   Respiratory: Negative for apnea, cough, chest tightness, shortness of breath and wheezing  Cardiovascular: Negative for chest pain, palpitations and leg swelling     Gastrointestinal: Patient last seen 9/11/20. She cancelled 12/2/20, 3/16/21, 6/1/21 and 1020/21. Next appointment scheduled for 2/8/22.    Please advise on refill.   Negative for abdominal pain  Neurological: Negative for dizziness and light-headedness  Psychiatric/Behavioral: Negative  Objective:  Stable cardiac-wise  /70 (BP Location: Left arm, Patient Position: Sitting, Cuff Size: Adult)   Pulse 70   Ht 5' 1" (1 549 m)   Wt 64 9 kg (143 lb)   SpO2 98%   BMI 27 02 kg/m²          Physical Exam  Vitals reviewed  Constitutional:       General: He is not in acute distress  Appearance: He is well-developed  He is not diaphoretic  HENT:      Head: Normocephalic  Eyes:      Pupils: Pupils are equal, round, and reactive to light  Neck:      Thyroid: No thyromegaly  Vascular: No JVD  Cardiovascular:      Rate and Rhythm: Normal rate and regular rhythm  Heart sounds: S1 normal and S2 normal  No murmur heard  No friction rub  No gallop  Pulmonary:      Effort: Pulmonary effort is normal  No respiratory distress  Breath sounds: Normal breath sounds  No wheezing or rales  Chest:      Chest wall: No tenderness  Abdominal:      Palpations: Abdomen is soft  Musculoskeletal:         General: No tenderness or deformity  Normal range of motion  Cervical back: Normal range of motion  Right lower leg: No edema  Left lower leg: No edema  Skin:     General: Skin is warm and dry  Neurological:      Mental Status: He is alert and oriented to person, place, and time     Psychiatric:         Mood and Affect: Mood normal          Behavior: Behavior normal

## 2022-04-23 ENCOUNTER — IMMUNIZATIONS (OUTPATIENT)
Dept: FAMILY MEDICINE CLINIC | Facility: HOSPITAL | Age: 72
End: 2022-04-23

## 2022-04-23 PROCEDURE — 91305 COVID-19 PFIZER VACC TRIS-SUCROSE GRAY CAP 0.3 ML: CPT

## 2022-04-23 PROCEDURE — 0054A COVID-19 PFIZER VACC TRIS-SUCROSE GRAY CAP 0.3 ML: CPT

## 2022-05-06 DIAGNOSIS — R51.9 NONINTRACTABLE HEADACHE, UNSPECIFIED CHRONICITY PATTERN, UNSPECIFIED HEADACHE TYPE: ICD-10-CM

## 2022-05-06 RX ORDER — FLUTICASONE PROPIONATE 50 MCG
1 SPRAY, SUSPENSION (ML) NASAL DAILY
Qty: 16 G | Refills: 0 | Status: SHIPPED | OUTPATIENT
Start: 2022-05-06 | End: 2022-06-15

## 2022-05-27 DIAGNOSIS — I10 ESSENTIAL HYPERTENSION: ICD-10-CM

## 2022-05-27 RX ORDER — LOSARTAN POTASSIUM 100 MG/1
100 TABLET ORAL DAILY
Qty: 90 TABLET | Refills: 3 | Status: SHIPPED | OUTPATIENT
Start: 2022-05-27

## 2022-06-09 ENCOUNTER — RA CDI HCC (OUTPATIENT)
Dept: OTHER | Facility: HOSPITAL | Age: 72
End: 2022-06-09

## 2022-06-12 DIAGNOSIS — G44.001 INTRACTABLE CLUSTER HEADACHE SYNDROME, UNSPECIFIED CHRONICITY PATTERN: ICD-10-CM

## 2022-06-12 RX ORDER — RIZATRIPTAN BENZOATE 10 MG/1
TABLET ORAL
Qty: 9 TABLET | Refills: 0 | Status: SHIPPED | OUTPATIENT
Start: 2022-06-12

## 2022-06-15 ENCOUNTER — OFFICE VISIT (OUTPATIENT)
Dept: FAMILY MEDICINE CLINIC | Facility: CLINIC | Age: 72
End: 2022-06-15
Payer: MEDICARE

## 2022-06-15 VITALS
RESPIRATION RATE: 16 BRPM | DIASTOLIC BLOOD PRESSURE: 74 MMHG | WEIGHT: 147.8 LBS | SYSTOLIC BLOOD PRESSURE: 140 MMHG | BODY MASS INDEX: 27.9 KG/M2 | HEIGHT: 61 IN | HEART RATE: 71 BPM | TEMPERATURE: 97.9 F | OXYGEN SATURATION: 97 %

## 2022-06-15 DIAGNOSIS — E03.9 ACQUIRED HYPOTHYROIDISM: Primary | ICD-10-CM

## 2022-06-15 DIAGNOSIS — L98.9 HAND LESION: ICD-10-CM

## 2022-06-15 DIAGNOSIS — E78.2 MIXED HYPERLIPIDEMIA: ICD-10-CM

## 2022-06-15 DIAGNOSIS — I10 ESSENTIAL HYPERTENSION: ICD-10-CM

## 2022-06-15 PROBLEM — J01.00 ACUTE NON-RECURRENT MAXILLARY SINUSITIS: Status: RESOLVED | Noted: 2022-01-27 | Resolved: 2022-06-15

## 2022-06-15 PROCEDURE — 99214 OFFICE O/P EST MOD 30 MIN: CPT | Performed by: FAMILY MEDICINE

## 2022-06-15 NOTE — PROGRESS NOTES
Assessment/Plan:    1  Acquired hypothyroidism  Assessment & Plan:  Check tsh    Orders:  -     TSH, 3rd generation with Free T4 reflex; Future; Expected date: 06/15/2022    2  Mixed hyperlipidemia  Assessment & Plan:  Check lipids    Orders:  -     CBC and differential; Future; Expected date: 06/15/2022  -     Comprehensive metabolic panel; Future; Expected date: 06/15/2022  -     Lipid Panel with Direct LDL reflex; Future; Expected date: 06/15/2022    3  Essential hypertension  Assessment & Plan:  Stable on current meds      4  Hand lesion  Assessment & Plan:  posible AK  Will refer to derm    Orders:  -     Ambulatory Referral to Dermatology; Future            There are no Patient Instructions on file for this visit  Return in about 6 months (around 12/15/2022)  Subjective:      Patient ID: Brandon Shafer is a 66 y o  male  Chief Complaint   Patient presents with    Follow-up     Patient here for follow up        Here for follow up  Was in [facility] and tested positive on the return  May 17th was positive- was asymptomatic  Feels good  Biking everyday      Hypertension  This is a chronic problem  The current episode started more than 1 year ago  The problem is unchanged  The problem is controlled  There are no associated agents to hypertension  Past treatments include angiotensin blockers  The current treatment provides significant improvement  There are no compliance problems  The following portions of the patient's history were reviewed and updated as appropriate: allergies, current medications, past family history, past medical history, past social history, past surgical history and problem list     Review of Systems   Constitutional: Negative  HENT: Negative  Eyes: Negative  Respiratory: Negative  Cardiovascular: Negative  Gastrointestinal: Negative  Endocrine: Negative  Genitourinary: Negative  Musculoskeletal: Negative  Skin: Positive for rash (hand lesion)  Allergic/Immunologic: Negative  Neurological: Negative  Hematological: Negative  Psychiatric/Behavioral: Negative  Current Outpatient Medications   Medication Sig Dispense Refill    aspirin 81 MG tablet Take by mouth      atorvastatin (LIPITOR) 10 mg tablet Take 1 tablet (10 mg total) by mouth every other day 45 tablet 3    brinzolamide (AZOPT) 1 % ophthalmic suspension       esomeprazole (NexIUM) 20 mg capsule Take 20 mg by mouth every other day       fluticasone (FLONASE) 50 mcg/act nasal spray 1 spray into each nostril daily for 7 days 16 g 0    latanoprost (XALATAN) 0 005 % ophthalmic solution Administer 1 drop to both eyes       levothyroxine 50 mcg tablet Take 1 tablet (50 mcg total) by mouth daily Take on an empty stomach 90 tablet 3    losartan (COZAAR) 100 MG tablet Take 1 tablet (100 mg total) by mouth daily 90 tablet 3    rizatriptan (MAXALT) 10 MG tablet TAKE 1 TABLET BY MOUTH ONCE AS NEEDED FOR MIGRIANE FOR UP TO 1 DOSE  MAY REPEAT IN 2 HOURS IF NEEDED 9 tablet 0    timolol (TIMOPTIC) 0 5 % ophthalmic solution INSTILL 1 DROP IN EACH EYE TWO TIMES DAILY      clindamycin (CLEOCIN) 150 mg capsule TAKE 1 CAPSULE BY MOUTH FOUR TIMES DAILY (Patient not taking: Reported on 6/15/2022)      ketorolac (ACULAR) 0 5 % ophthalmic solution INSTILL 1 DROP IN THE LEFT EYE FOUR TIMES DAILY START 1 DAY PRIOR TO SURGERY (Patient not taking: Reported on 6/15/2022)      ofloxacin (OCUFLOX) 0 3 % ophthalmic solution INSTILL 1 DROP IN THE LEFT EYE FOUR TIMES DAILY START 1 DAY PRIOR TO SURGERY (Patient not taking: Reported on 6/15/2022)       No current facility-administered medications for this visit         Objective:    /74 (BP Location: Left arm, Patient Position: Sitting, Cuff Size: Standard)   Pulse 71   Temp 97 9 °F (36 6 °C) (Tympanic)   Resp 16   Ht 5' 1" (1 549 m)   Wt 67 kg (147 lb 12 8 oz)   SpO2 97%   BMI 27 93 kg/m²        Physical Exam  Vitals and nursing note reviewed  Constitutional:       Appearance: Normal appearance  HENT:      Head: Normocephalic and atraumatic  Eyes:      Extraocular Movements: Extraocular movements intact  Pupils: Pupils are equal, round, and reactive to light  Cardiovascular:      Rate and Rhythm: Normal rate and regular rhythm  Pulses: Normal pulses  Heart sounds: Normal heart sounds  Pulmonary:      Effort: Pulmonary effort is normal       Breath sounds: Normal breath sounds  Abdominal:      General: Abdomen is flat  Palpations: Abdomen is soft  Musculoskeletal:      Cervical back: Normal range of motion and neck supple  Skin:     Capillary Refill: Capillary refill takes less than 2 seconds  Findings: Lesion (rough on hand, red-poss precancerous) present  Neurological:      General: No focal deficit present  Mental Status: He is alert and oriented to person, place, and time  Psychiatric:         Mood and Affect: Mood normal          Behavior: Behavior normal          Thought Content:  Thought content normal          Judgment: Judgment normal                 Brandon Andrade DO

## 2022-06-22 ENCOUNTER — IOP CHECK (OUTPATIENT)
Dept: URBAN - METROPOLITAN AREA CLINIC 6 | Facility: CLINIC | Age: 72
End: 2022-06-22

## 2022-06-22 DIAGNOSIS — Z96.1: ICD-10-CM

## 2022-06-22 DIAGNOSIS — H04.123: ICD-10-CM

## 2022-06-22 DIAGNOSIS — H40.1132: ICD-10-CM

## 2022-06-22 PROCEDURE — 92012 INTRM OPH EXAM EST PATIENT: CPT

## 2022-06-22 ASSESSMENT — KERATOMETRY
OD_AXISANGLE2_DEGREES: 120
OD_AXISANGLE_DEGREES: 030
OD_K2POWER_DIOPTERS: 42.50
OS_K2POWER_DIOPTERS: 43.25
OS_K1POWER_DIOPTERS: 42.25
OS_AXISANGLE_DEGREES: 165
OD_K1POWER_DIOPTERS: 42.00
OS_AXISANGLE2_DEGREES: 75

## 2022-06-22 ASSESSMENT — VISUAL ACUITY
OS_SC: 20/20
OD_SC: 20/30-2
OU_CC: J1

## 2022-06-22 ASSESSMENT — TONOMETRY
OS_IOP_MMHG: 21
OD_IOP_MMHG: 24

## 2022-06-23 ENCOUNTER — OFFICE VISIT (OUTPATIENT)
Dept: DERMATOLOGY | Facility: CLINIC | Age: 72
End: 2022-06-23
Payer: MEDICARE

## 2022-06-23 VITALS — WEIGHT: 143.31 LBS | HEIGHT: 61 IN | BODY MASS INDEX: 27.06 KG/M2

## 2022-06-23 DIAGNOSIS — L57.0 ACTINIC KERATOSIS: Primary | ICD-10-CM

## 2022-06-23 PROCEDURE — 17003 DESTRUCT PREMALG LES 2-14: CPT | Performed by: DERMATOLOGY

## 2022-06-23 PROCEDURE — 99202 OFFICE O/P NEW SF 15 MIN: CPT | Performed by: DERMATOLOGY

## 2022-06-23 PROCEDURE — 17000 DESTRUCT PREMALG LESION: CPT | Performed by: DERMATOLOGY

## 2022-06-23 NOTE — PROGRESS NOTES
[facility] Note     Patient Name: Keri Bee  Encounter Date: 06/23/22     Have you been cared for by a [facility] in the last 3 years and, if so, which one? No    · Have you traveled outside of the [address] [place] [address] in the past 3 months or outside of the [facility] area in the last 2 weeks? No     May we call your Preferred Phone number to discuss your specific medical information? Yes     May we leave a detailed message that includes your specific medical information? Yes      Today's Chief Concerns:   Concern #1:  Lesion on the left hand     Past Medical History:  Have you personally ever had or currently have any of the following? · Skin cancer (such as Melanoma, Basal Cell Carcinoma, Squamous Cell Carcinoma? (If Yes, please provide more detail)- No  · Eczema: No  · Psoriasis: No  · HIV/AIDS: No  · Hepatitis B or C: No  · Tuberculosis: No  · Systemic Immunosuppression such as Diabetes, Biologic or Immunotherapy, Chemotherapy, Organ Transplantation, Bone Marrow Transplantation (If YES, please provide more detail): No  · Radiation Treatment (If YES, please provide more detail): No  · Any other major medical conditions/concerns? (If Yes, which types)- No    Social History:     What is/was your primary occupation?  What are your hobbies/past-times? Family History:  Have any of your "first degree relatives" (parent, brother, sister, or child) had any of the following       · Skin cancer such as Melanoma or Merkel Cell Carcinoma or Pancreatic Cancer? No  · Eczema, Asthma, Hay Fever or Seasonal Allergies: No  · Psoriasis or Psoriatic Arthritis: No  · Do any other medical conditions seem to run in your family? If Yes, what condition and which relatives?   No    Current Medications:         Current Outpatient Medications:     aspirin 81 MG tablet, Take by mouth, Disp: , Rfl:     atorvastatin (LIPITOR) 10 mg tablet, Take 1 tablet (10 mg total) by mouth every other day, Disp: 45 tablet, Rfl: 3    brinzolamide (AZOPT) 1 % ophthalmic suspension, , Disp: , Rfl:     esomeprazole (NexIUM) 20 mg capsule, Take 20 mg by mouth every other day , Disp: , Rfl:     latanoprost (XALATAN) 0 005 % ophthalmic solution, Administer 1 drop to both eyes , Disp: , Rfl:     levothyroxine 50 mcg tablet, Take 1 tablet (50 mcg total) by mouth daily Take on an empty stomach, Disp: 90 tablet, Rfl: 3    losartan (COZAAR) 100 MG tablet, Take 1 tablet (100 mg total) by mouth daily, Disp: 90 tablet, Rfl: 3    rizatriptan (MAXALT) 10 MG tablet, TAKE 1 TABLET BY MOUTH ONCE AS NEEDED FOR MIGRIANE FOR UP TO 1 DOSE  MAY REPEAT IN 2 HOURS IF NEEDED, Disp: 9 tablet, Rfl: 0    timolol (TIMOPTIC) 0 5 % ophthalmic solution, INSTILL 1 DROP IN [facility] EYE TWO TIMES DAILY, Disp: , Rfl:     clindamycin (CLEOCIN) 150 mg capsule, TAKE 1 CAPSULE BY MOUTH FOUR TIMES DAILY (Patient not taking: No sig reported), Disp: , Rfl:     fluticasone (FLONASE) 50 mcg/act nasal spray, 1 spray into each nostril daily for 7 days, Disp: 16 g, Rfl: 0    ketorolac (ACULAR) 0 5 % ophthalmic solution, INSTILL 1 DROP IN THE LEFT EYE FOUR TIMES DAILY START 1 DAY PRIOR TO SURGERY (Patient not taking: Reported on 6/15/2022), Disp: , Rfl:     ofloxacin (OCUFLOX) 0 3 % ophthalmic solution, INSTILL 1 DROP IN THE LEFT EYE FOUR TIMES DAILY START 1 DAY PRIOR TO SURGERY (Patient not taking: No sig reported), Disp: , Rfl:       Review of Systems:  Have you recently had or currently have any of the following? If YES, what are you doing for the problem? · Fever, chills or unintended weight loss: No  · Sudden loss or change in your vision: No  · Nausea, vomiting or blood in your stool: No  · Painful or swollen joints: No  · Wheezing or cough: No  · Changing mole or non-healing wound: No  · Nosebleeds: No  · Excessive sweating: No  · Easy or prolonged bleeding?   No  · Over the last 2 weeks, how often have you been bothered by the following problems? · Taking little interest or pleasure in doing things: 1 - Not at All  · Feeling down, depressed, or hopeless: 1 - Not at All  · Rapid heartbeat with epinephrine:  No    · FEMALES ONLY:    · Are you pregnant or planning to become pregnant? N/A  · Are you currently or planning to be nursing or breast feeding? N/A    · Any known allergies? Allergies   Allergen Reactions    Clindamycin GI Intolerance     Per Patient    Doxycycline Other (See Comments)     unknown    Influenza Vaccines Other (See Comments)     Flu like symptoms     Nitrous Oxide    ·       Physical Exam:     Was a chaperone (Derm Clinical Assistant) present throughout the entire Physical Exam? Yes     Did the Dermatology Team specifically  the patient on the importance of a Full Skin Exam to be sure that nothing is missed clinically? Yes}  o Did the patient ultimately request or accept a Full Skin Exam?  NO  o Did the patient specifically refuse to have the areas "under-the-bra" examined by the Dermatologist? Koby Solorio  o Did the patient specifically refuse to have the areas "under-the-underwear" examined by the Dermatologist? YES    CONSTITUTIONAL:   Vitals:    06/23/22 1322   Weight: 65 kg (143 lb 5 oz)   Height: 5' 1" (1 549 m)       PSYCH: Normal mood and affect  EYES: Normal conjunctiva  ENT: Normal lips and oral mucosa  CARDIOVASCULAR: No edema  RESPIRATORY: Normal respirations  HEME/LYMPH/IMMUNO:  No regional lymphadenopathy except as noted below in "ASSESSMENT AND PLAN BY DIAGNOSIS"         Assessment and Plan by Diagnosis:    History of Present Condition:     Duration:  How long has this been an issue for you? o  6 months    Location Affected:  Where on the body is this affecting you?    o  left hand    Quality:  Is there any bleeding, pain, itch, burning/irritation, or redness associated with the skin lesion?     o  redness    Severity:  Describe any bleeding, pain, itch, burning/irritation, or redness on a scale of 1 to 10 (with 10 being the worst)  o  3   Timing:  Does this condition seem to be there pretty constantly or do you notice it more at specific times throughout the day? o  constant    Context:  Have you ever noticed that this condition seems to be associated with specific activities you do?    o  denies    Modifying Factors:    o Anything that seems to make the condition worse?    -  denies   o What have you tried to do to make the condition better?    -  hand lotions    Associated Signs and Symptoms:  Does this skin lesion seem to be associated with any of the following:  o  SL AMB DERM SIGNS AND SYMPTOMS: Redness       1  ACTINIC KERATOSIS    Physical Exam:   Anatomic Location Affected:  Left hand    Morphological Description:  Scaly pink papules    Assessment and Plan:  Based on a thorough discussion of this condition and the management approach to it (including a comprehensive discussion of the known risks, side effects and potential benefits of treatment), the patient (family) agrees to implement the following specific plan:     Liquid nitrogen was applied for 10-12 seconds to the skin lesion and the expected blistering or scabbing reaction explained  Do not pick at the area  Patient reminded to expect hypopigmented scars from the procedure  Return if lesion fails to fully resolve  Actinic keratoses are very common on sites repeatedly exposed to the sun, especially the backs of the hands and the face, most often affecting the ears, nose, cheeks, upper lip, vermilion of the lower lip, temples, forehead and balding scalp  In severely chronically sun-damaged individuals, they may also be found on the upper trunk, upper and lower limbs, and dorsum of feet  We discussed the theoretical premalignant (pre-cancerous) nature and etiology of these growths    We discussed the prevailing notion that actinic keratoses are a reflection of abnormal skin cell development due to DNA damage by short wavelength UVB  They are more likely to appear if the immune function is poor, due to aging, recent sun exposure, predisposing disease or certain drugs  We discussed that the main concern is that actinic keratoses may predispose to squamous cell carcinoma  It is rare for a solitary actinic keratosis to evolve to squamous cell carcinoma (SCC), but the risk of SCC occurring at some stage in a patient with more than 10 actinic keratoses is thought to be about 10 to 15%  A tender, thickened, ulcerated or enlarging actinic keratosis is suspicious of SCC  Actinic keratoses may be prevented by strict sun protection  If already present, keratoses may improve with a very high sun protection factor (50+) broad-spectrum sunscreen applied at least daily to affected areas, year-round  We recommend that UPF-rated clothing and hats and sunglasses be worn whenever possible and that a sunscreen-moisturizer combination product such as Neutrogena Daily Defense be applied at least three times a day  We performed a thorough discussion of treatment options and specific risk/benefits/alternatives including but not limited to medical field treatment with medications such as the following:     Topical field area medications such as 5-fluorouracil or Aldara (specifically, the trouble with long-term compliance, blistering and local skin reaction versus the convenience of at-home therapy and that field therapy gets what is not yet seen)   Cryotherapy (specifically, local pain, scarring, dyspigmentation, blistering, possible superinfection, and treats only what we see versus directed treatment today)   Photodynamic therapy (specifically, local pain, scarring, dyspigmentation, blistering, possible superinfection, need to schedule for a later date, and time spent in the office versus field therapy that gets what is not yet seen)      PROCEDURE:  DESTRUCTION OF PRE-MALIGNANT LESIONS  After a thorough discussion of treatment options and risk/benefits/alternatives (including but not limited to local pain, scarring, dyspigmentation, blistering, and possible superinfection), verbal and written consent were obtained and the aforementioned lesions were treated on with cryotherapy using liquid nitrogen x 1 cycle for 5-10 seconds   TOTAL NUMBER of 2 pre-malignant lesions were treated today on the ANATOMIC LOCATION: left hand  The patient tolerated the procedure well, and after-care instructions were provided        Scribe Attestation    I,:  Torrie Powell MA am acting as a scribe while in the presence of the attending physician :       I,:  Breezy Camejo MD personally performed the services described in this documentation    as scribed in my presence :

## 2022-06-23 NOTE — PATIENT INSTRUCTIONS
Assessment and Plan:  Based on a thorough discussion of this condition and the management approach to it (including a comprehensive discussion of the known risks, side effects and potential benefits of treatment), the patient (family) agrees to implement the following specific plan:    Liquid nitrogen was applied for 10-12 seconds to the skin lesion and the expected blistering or scabbing reaction explained  Do not pick at the area  Patient reminded to expect hypopigmented scars from the procedure  Return if lesion fails to fully resolve  Actinic keratoses are very common on sites repeatedly exposed to the sun, especially the backs of the hands and the face, most often affecting the ears, nose, cheeks, upper lip, vermilion of the lower lip, temples, forehead and balding scalp  In severely chronically sun-damaged individuals, they may also be found on the upper trunk, upper and lower limbs, and dorsum of feet  We discussed the theoretical premalignant (pre-cancerous) nature and etiology of these growths  We discussed the prevailing notion that actinic keratoses are a reflection of abnormal skin cell development due to DNA damage by short wavelength UVB  They are more likely to appear if the immune function is poor, due to aging, recent sun exposure, predisposing disease or certain drugs  We discussed that the main concern is that actinic keratoses may predispose to squamous cell carcinoma  It is rare for a solitary actinic keratosis to evolve to squamous cell carcinoma (SCC), but the risk of SCC occurring at some stage in a patient with more than 10 actinic keratoses is thought to be about 10 to 15%  A tender, thickened, ulcerated or enlarging actinic keratosis is suspicious of SCC  Actinic keratoses may be prevented by strict sun protection   If already present, keratoses may improve with a very high sun protection factor (50+) broad-spectrum sunscreen applied at least daily to affected areas, year-round  We recommend that UPF-rated clothing and hats and sunglasses be worn whenever possible and that a sunscreen-moisturizer combination product such as Neutrogena Daily Defense be applied at least three times a day  We performed a thorough discussion of treatment options and specific risk/benefits/alternatives including but not limited to medical field treatment with medications such as the following:    Topical field area medications such as 5-fluorouracil or Aldara (specifically, the trouble with long-term compliance, blistering and local skin reaction versus the convenience of at-home therapy and that field therapy gets what is not yet seen)  Cryotherapy (specifically, local pain, scarring, dyspigmentation, blistering, possible superinfection, and treats only what we see versus directed treatment today)  Photodynamic therapy (specifically, local pain, scarring, dyspigmentation, blistering, possible superinfection, need to schedule for a later date, and time spent in the office versus field therapy that gets what is not yet seen)

## 2022-06-27 ENCOUNTER — TELEPHONE (OUTPATIENT)
Dept: DERMATOLOGY | Facility: CLINIC | Age: 72
End: 2022-06-27

## 2022-06-27 NOTE — TELEPHONE ENCOUNTER
Pt is submitting a picture through [employer] of one of his spots that Dr Jessica Crum took care of on 6/23, since he caught it on something and it was bleeding    Wants to know what she thinks

## 2022-08-14 DIAGNOSIS — E78.2 MIXED HYPERLIPIDEMIA: ICD-10-CM

## 2022-08-14 RX ORDER — ATORVASTATIN CALCIUM 10 MG/1
TABLET, FILM COATED ORAL
Qty: 45 TABLET | Refills: 3 | Status: SHIPPED | OUTPATIENT
Start: 2022-08-14 | End: 2022-09-11

## 2022-09-11 DIAGNOSIS — E78.2 MIXED HYPERLIPIDEMIA: ICD-10-CM

## 2022-09-11 RX ORDER — ATORVASTATIN CALCIUM 10 MG/1
TABLET, FILM COATED ORAL
Qty: 45 TABLET | Refills: 3 | Status: SHIPPED | OUTPATIENT
Start: 2022-09-11

## 2022-09-21 DIAGNOSIS — R51.9 NONINTRACTABLE HEADACHE, UNSPECIFIED CHRONICITY PATTERN, UNSPECIFIED HEADACHE TYPE: ICD-10-CM

## 2022-09-21 RX ORDER — FLUTICASONE PROPIONATE 50 MCG
1 SPRAY, SUSPENSION (ML) NASAL DAILY
Qty: 16 G | Refills: 5 | Status: SHIPPED | OUTPATIENT
Start: 2022-09-21 | End: 2022-09-28

## 2022-09-21 RX ORDER — FLUTICASONE PROPIONATE 50 MCG
1 SPRAY, SUSPENSION (ML) NASAL DAILY
Qty: 16 G | Refills: 5 | Status: SHIPPED | OUTPATIENT
Start: 2022-09-21 | End: 2022-09-21 | Stop reason: SDUPTHER

## 2022-10-04 ENCOUNTER — OFFICE VISIT (OUTPATIENT)
Dept: OBGYN CLINIC | Facility: HOSPITAL | Age: 72
End: 2022-10-04
Payer: MEDICARE

## 2022-10-04 VITALS
HEIGHT: 61 IN | BODY MASS INDEX: 28.7 KG/M2 | SYSTOLIC BLOOD PRESSURE: 170 MMHG | WEIGHT: 152 LBS | DIASTOLIC BLOOD PRESSURE: 99 MMHG | HEART RATE: 57 BPM

## 2022-10-04 DIAGNOSIS — M17.11 PRIMARY OSTEOARTHRITIS OF RIGHT KNEE: ICD-10-CM

## 2022-10-04 DIAGNOSIS — M70.50 PES ANSERINE BURSITIS: Primary | ICD-10-CM

## 2022-10-04 PROCEDURE — 20610 DRAIN/INJ JOINT/BURSA W/O US: CPT | Performed by: ORTHOPAEDIC SURGERY

## 2022-10-04 PROCEDURE — 99213 OFFICE O/P EST LOW 20 MIN: CPT | Performed by: ORTHOPAEDIC SURGERY

## 2022-10-04 RX ORDER — LIDOCAINE HYDROCHLORIDE 10 MG/ML
2 INJECTION, SOLUTION INFILTRATION; PERINEURAL
Status: COMPLETED | OUTPATIENT
Start: 2022-10-04 | End: 2022-10-04

## 2022-10-04 RX ORDER — BETAMETHASONE SODIUM PHOSPHATE AND BETAMETHASONE ACETATE 3; 3 MG/ML; MG/ML
12 INJECTION, SUSPENSION INTRA-ARTICULAR; INTRALESIONAL; INTRAMUSCULAR; SOFT TISSUE
Status: COMPLETED | OUTPATIENT
Start: 2022-10-04 | End: 2022-10-04

## 2022-10-04 RX ORDER — BUPIVACAINE HYDROCHLORIDE 2.5 MG/ML
2 INJECTION, SOLUTION INFILTRATION; PERINEURAL
Status: COMPLETED | OUTPATIENT
Start: 2022-10-04 | End: 2022-10-04

## 2022-10-04 RX ADMIN — LIDOCAINE HYDROCHLORIDE 2 ML: 10 INJECTION, SOLUTION INFILTRATION; PERINEURAL at 10:24

## 2022-10-04 RX ADMIN — BETAMETHASONE SODIUM PHOSPHATE AND BETAMETHASONE ACETATE 12 MG: 3; 3 INJECTION, SUSPENSION INTRA-ARTICULAR; INTRALESIONAL; INTRAMUSCULAR; SOFT TISSUE at 10:24

## 2022-10-04 RX ADMIN — BUPIVACAINE HYDROCHLORIDE 2 ML: 2.5 INJECTION, SOLUTION INFILTRATION; PERINEURAL at 10:24

## 2022-10-04 NOTE — PROGRESS NOTES
Assessment:  1  Pes anserine bursitis     2  Primary osteoarthritis of right knee         Plan:  Right knee pes anserine bursitis  The patient was provided with right pes anserine steroid injection  The patient tolerated the procedure well  The patient should follow up in 3 months  To do next visit:  Return in about 3 months (around 1/4/2023)  The above stated was discussed in layman's terms and the patient expressed understanding  All questions were answered to the patient's satisfaction  Scribe Attestation    I,:  Helen Sparks am acting as a scribe while in the presence of the attending physician :       I,:  Amari Moreno MD personally performed the services described in this documentation    as scribed in my presence :             Subjective:   Pool Cruz is a 66 y o  male who presents for follow up of bilateral knees  He is s/p right pes anserine steroid injection with benefit, 1/12/2022  Today he complains of right anteromedial and left generalized knee pain  Stairs aggravates while rest alleviates  He denies medications for this issue  He denies past knee surgery  Review of systems negative unless otherwise specified in HPI    Past Medical History:   Diagnosis Date    Acute non-recurrent maxillary sinusitis 4/15/2019    Angina effort     Anxiety     Roberts's esophagus     Bleeding from left ear 4/26/2019    Chest pain 11/7/2019    Chest pain, unspecified     Cough 2/13/2018    GERD (gastroesophageal reflux disease)     Hyperlipidemia     Hypertension     Impacted cerumen of right ear 4/25/2019    Macular hole, right eye 1/28/2019    Last Assessment & Plan:  Impression:  1  Macular hole, right eye (dx 11/2018 ~295 microns, now 391 micron) 2  PP OD (10/2018) 3  Cataract OS   Plan: - Second opinion   Lives in [place] with plan for surgery at [address] [place] [address] getting the surgery as the best way to fix the [address] - Follow up as needed    Migraine     Mild depression 2019    Otorrhea, left 2019    SBO (small bowel obstruction) ([facility] [address] ) 3/23/2019       Past Surgical History:   Procedure Laterality Date    CATARACT EXTRACTION      EYE SURGERY      HERNIA REPAIR Right 2014       Family History   Problem Relation Age of Onset    No Known Problems Mother     Heart attack Father     Breast cancer Paternal Aunt        Social History     Occupational History    Not on file   Tobacco Use    Smoking status: Former Smoker     Packs/day: 0 25     Years: 4 00     Pack years: 1 00     Types: Cigarettes     Quit date:      Years since quittin 7    Smokeless tobacco: Never Used   Substance and Sexual Activity    Alcohol use:  Yes    Drug use: Never    Sexual activity: Yes     Partners: Female         Current Outpatient Medications:     aspirin 81 MG tablet, Take by mouth, Disp: , Rfl:     atorvastatin (LIPITOR) 10 mg tablet, TAKE ONE TABLET BY MOUTH EVERY OTHER DAY, Disp: 45 tablet, Rfl: 3    brinzolamide (AZOPT) 1 % ophthalmic suspension, , Disp: , Rfl:     clindamycin (CLEOCIN) 150 mg capsule, TAKE 1 CAPSULE BY MOUTH FOUR TIMES DAILY (Patient not taking: No sig reported), Disp: , Rfl:     esomeprazole (NexIUM) 20 mg capsule, Take 20 mg by mouth every other day , Disp: , Rfl:     fluticasone (FLONASE) 50 mcg/act nasal spray, 1 spray into each nostril daily for 7 days, Disp: 16 g, Rfl: 5    latanoprost (XALATAN) 0 005 % ophthalmic solution, Administer 1 drop to both eyes , Disp: , Rfl:     levothyroxine 50 mcg tablet, Take 1 tablet (50 mcg total) by mouth daily Take on an empty stomach, Disp: 90 tablet, Rfl: 3    losartan (COZAAR) 100 MG tablet, Take 1 tablet (100 mg total) by mouth daily, Disp: 90 tablet, Rfl: 3    ofloxacin (OCUFLOX) 0 3 % ophthalmic solution, INSTILL 1 DROP IN THE LEFT EYE FOUR TIMES DAILY START 1 DAY PRIOR TO SURGERY (Patient not taking: No sig reported), Disp: , Rfl:     rizatriptan (MAXALT) 10 MG tablet, TAKE 1 TABLET BY MOUTH ONCE AS NEEDED FOR MIGRIANE FOR UP TO 1 DOSE  MAY REPEAT IN 2 HOURS IF NEEDED, Disp: 9 tablet, Rfl: 0    timolol (TIMOPTIC) 0 5 % ophthalmic solution, INSTILL 1 DROP IN [facility] EYE TWO TIMES DAILY, Disp: , Rfl:     Allergies   Allergen Reactions    Clindamycin GI Intolerance     Per Patient    Doxycycline Other (See Comments)     unknown    Influenza Vaccines Other (See Comments)     Flu like symptoms     Nitrous Oxide             Vitals:    10/04/22 1001   BP: 170/99   Pulse: 57       Objective:  Physical exam  · General: Awake, Alert, Oriented  · Eyes: Pupils equal, round and reactive to light  · Heart: regular rate and rhythm  · Lungs: No audible wheezing  · Abdomen: soft                    Ortho Exam  Right knee:  TTP over pes anserine  No erythema or ecchymosis  No effusion or swelling  Normal strength  Good ROM   Calf compartments soft and supple  Sensation intact  Toes are warm sensate and mobile    Left knee:  No erythema or ecchymosis  No effusion or swelling  Normal strength  Good ROM   Calf compartments soft and supple  Sensation intact  Toes are warm sensate and mobile      Diagnostics, reviewed and taken today if performed as documented:    None performed     Procedures, if performed today:    Large joint arthrocentesis: R pes anserine bursa  Universal Protocol:  Consent: Verbal consent obtained  Risks and benefits: risks, benefits and alternatives were discussed  Consent given by: patient  Time out: Immediately prior to procedure a "time out" was called to verify the correct patient, procedure, equipment, support staff and site/side marked as required    Timeout called at: 10/4/2022 10:23 AM   Patient understanding: patient states understanding of the procedure being performed  Site marked: the operative site was marked  Patient identity confirmed: verbally with patient    Supporting Documentation  Indications: pain   Procedure Details  Location: knee - R pes anserine bursa  Preparation: Patient was prepped and draped in the usual sterile fashion  Needle size: 22 G  Ultrasound guidance: no  Approach: anterolateral  Medications administered: 12 mg betamethasone acetate-betamethasone sodium phosphate 6 (3-3) mg/mL; 2 mL bupivacaine 0 25 %; 2 mL lidocaine 1 %    Patient tolerance: patient tolerated the procedure well with no immediate complications  Dressing:  Sterile dressing applied            Portions of the record may have been created with voice recognition software  Occasional wrong word or "sound a like" substitutions may have occurred due to the inherent limitations of voice recognition software  Read the chart carefully and recognize, using context, where substitutions have occurred

## 2022-10-12 PROBLEM — Z13.6 ENCOUNTER FOR SCREENING FOR CARDIOVASCULAR DISORDERS: Status: RESOLVED | Noted: 2021-12-14 | Resolved: 2022-10-12

## 2022-10-13 ENCOUNTER — APPOINTMENT (OUTPATIENT)
Dept: LAB | Age: 72
End: 2022-10-13
Payer: MEDICARE

## 2022-10-13 DIAGNOSIS — E78.2 MIXED HYPERLIPIDEMIA: ICD-10-CM

## 2022-10-13 DIAGNOSIS — E03.9 ACQUIRED HYPOTHYROIDISM: ICD-10-CM

## 2022-10-13 LAB
ALBUMIN SERPL BCP-MCNC: 3.4 G/DL (ref 3.5–5)
ALP SERPL-CCNC: 58 U/L (ref 46–116)
ALT SERPL W P-5'-P-CCNC: 25 U/L (ref 12–78)
ANION GAP SERPL CALCULATED.3IONS-SCNC: 3 MMOL/L (ref 4–13)
AST SERPL W P-5'-P-CCNC: 27 U/L (ref 5–45)
BASOPHILS # BLD AUTO: 0.03 THOUSANDS/ΜL (ref 0–0.1)
BASOPHILS NFR BLD AUTO: 1 % (ref 0–1)
BILIRUB SERPL-MCNC: 0.9 MG/DL (ref 0.2–1)
BUN SERPL-MCNC: 26 MG/DL (ref 5–25)
CALCIUM ALBUM COR SERPL-MCNC: 9.5 MG/DL (ref 8.3–10.1)
CALCIUM SERPL-MCNC: 9 MG/DL (ref 8.3–10.1)
CHLORIDE SERPL-SCNC: 107 MMOL/L (ref 96–108)
CHOLEST SERPL-MCNC: 203 MG/DL
CO2 SERPL-SCNC: 27 MMOL/L (ref 21–32)
CREAT SERPL-MCNC: 1.17 MG/DL (ref 0.6–1.3)
EOSINOPHIL # BLD AUTO: 0.16 THOUSAND/ΜL (ref 0–0.61)
EOSINOPHIL NFR BLD AUTO: 3 % (ref 0–6)
ERYTHROCYTE [DISTWIDTH] IN BLOOD BY AUTOMATED COUNT: 13.1 % (ref 11.6–15.1)
GFR SERPL CREATININE-BSD FRML MDRD: 61 ML/MIN/1.73SQ M
GLUCOSE P FAST SERPL-MCNC: 93 MG/DL (ref 65–99)
HCT VFR BLD AUTO: 50.9 % (ref 36.5–49.3)
HDLC SERPL-MCNC: 75 MG/DL
HGB BLD-MCNC: 16.4 G/DL (ref 12–17)
IMM GRANULOCYTES # BLD AUTO: 0.02 THOUSAND/UL (ref 0–0.2)
IMM GRANULOCYTES NFR BLD AUTO: 0 % (ref 0–2)
LDLC SERPL CALC-MCNC: 112 MG/DL (ref 0–100)
LYMPHOCYTES # BLD AUTO: 2.04 THOUSANDS/ΜL (ref 0.6–4.47)
LYMPHOCYTES NFR BLD AUTO: 37 % (ref 14–44)
MCH RBC QN AUTO: 29.2 PG (ref 26.8–34.3)
MCHC RBC AUTO-ENTMCNC: 32.2 G/DL (ref 31.4–37.4)
MCV RBC AUTO: 91 FL (ref 82–98)
MONOCYTES # BLD AUTO: 0.66 THOUSAND/ΜL (ref 0.17–1.22)
MONOCYTES NFR BLD AUTO: 12 % (ref 4–12)
NEUTROPHILS # BLD AUTO: 2.59 THOUSANDS/ΜL (ref 1.85–7.62)
NEUTS SEG NFR BLD AUTO: 47 % (ref 43–75)
NRBC BLD AUTO-RTO: 0 /100 WBCS
PLATELET # BLD AUTO: 215 THOUSANDS/UL (ref 149–390)
PMV BLD AUTO: 10.5 FL (ref 8.9–12.7)
POTASSIUM SERPL-SCNC: 4.2 MMOL/L (ref 3.5–5.3)
PROT SERPL-MCNC: 6.8 G/DL (ref 6.4–8.4)
RBC # BLD AUTO: 5.62 MILLION/UL (ref 3.88–5.62)
SODIUM SERPL-SCNC: 137 MMOL/L (ref 135–147)
TRIGL SERPL-MCNC: 79 MG/DL
TSH SERPL DL<=0.05 MIU/L-ACNC: 2.16 UIU/ML (ref 0.45–4.5)
WBC # BLD AUTO: 5.5 THOUSAND/UL (ref 4.31–10.16)

## 2022-10-13 PROCEDURE — 84443 ASSAY THYROID STIM HORMONE: CPT

## 2022-10-13 PROCEDURE — 36415 COLL VENOUS BLD VENIPUNCTURE: CPT

## 2022-10-13 PROCEDURE — 80053 COMPREHEN METABOLIC PANEL: CPT

## 2022-10-13 PROCEDURE — 85025 COMPLETE CBC W/AUTO DIFF WBC: CPT

## 2022-10-13 PROCEDURE — 80061 LIPID PANEL: CPT

## 2022-10-19 ENCOUNTER — IOP CHECK (OUTPATIENT)
Dept: URBAN - METROPOLITAN AREA CLINIC 6 | Facility: CLINIC | Age: 72
End: 2022-10-19

## 2022-10-19 DIAGNOSIS — H26.491: ICD-10-CM

## 2022-10-19 DIAGNOSIS — H04.123: ICD-10-CM

## 2022-10-19 DIAGNOSIS — H40.1132: ICD-10-CM

## 2022-10-19 DIAGNOSIS — Z96.1: ICD-10-CM

## 2022-10-19 PROCEDURE — 92083 EXTENDED VISUAL FIELD XM: CPT

## 2022-10-19 PROCEDURE — 92250 FUNDUS PHOTOGRAPHY W/I&R: CPT

## 2022-10-19 PROCEDURE — 92012 INTRM OPH EXAM EST PATIENT: CPT

## 2022-10-19 ASSESSMENT — VISUAL ACUITY
OD_SC: 20/25-2
OS_SC: 20/20-1
OU_SC: 20/200
OU_SC: 20/20-1

## 2022-10-19 ASSESSMENT — KERATOMETRY
OS_AXISANGLE_DEGREES: 165
OS_K2POWER_DIOPTERS: 43.25
OD_AXISANGLE_DEGREES: 030
OS_AXISANGLE2_DEGREES: 75
OD_K2POWER_DIOPTERS: 42.50
OD_AXISANGLE2_DEGREES: 120
OD_K1POWER_DIOPTERS: 42.00
OS_K1POWER_DIOPTERS: 42.25

## 2022-10-19 ASSESSMENT — TONOMETRY
OS_IOP_MMHG: 21
OD_IOP_MMHG: 21

## 2022-10-31 ENCOUNTER — OFFICE VISIT (OUTPATIENT)
Dept: CARDIOLOGY CLINIC | Facility: CLINIC | Age: 72
End: 2022-10-31

## 2022-10-31 VITALS
DIASTOLIC BLOOD PRESSURE: 76 MMHG | OXYGEN SATURATION: 98 % | WEIGHT: 152 LBS | HEIGHT: 61 IN | SYSTOLIC BLOOD PRESSURE: 138 MMHG | HEART RATE: 69 BPM | BODY MASS INDEX: 28.7 KG/M2

## 2022-10-31 DIAGNOSIS — I10 ESSENTIAL HYPERTENSION: Primary | ICD-10-CM

## 2022-10-31 DIAGNOSIS — F41.9 ANXIETY: ICD-10-CM

## 2022-10-31 DIAGNOSIS — E78.2 MIXED HYPERLIPIDEMIA: ICD-10-CM

## 2022-10-31 DIAGNOSIS — R07.9 CHEST PAIN, UNSPECIFIED TYPE: ICD-10-CM

## 2022-10-31 RX ORDER — ATORVASTATIN CALCIUM 10 MG/1
10 TABLET, FILM COATED ORAL DAILY
Qty: 90 TABLET | Refills: 3 | Status: SHIPPED | OUTPATIENT
Start: 2022-10-31

## 2022-10-31 NOTE — ASSESSMENT & PLAN NOTE
Atypical chest pain most probably musculoskeletal in origin    We will continue observation and the patient will let me know if the symptoms persist

## 2022-10-31 NOTE — ASSESSMENT & PLAN NOTE
Hyperlipidemia, slightly higher than desirable  I am asking the patient to increase atorvastatin to 10 mg daily

## 2022-10-31 NOTE — LETTER
October 31, 2022     Daniel Ramirez, [address] [facility]  [address] [address]    Patient: Melissa Buck   YOB: 1950   Date of Visit: 10/31/2022       Dear Dr Hailey Govea:    Thank you for referring Melissa Buck to me for evaluation  Below are my notes for this consultation  If you have questions, please do not hesitate to call me  I look forward to following your patient along with you  Sincerely,        Mata Sherman MD        CC: No Recipients  Mata Sherman MD  10/31/2022  3:29 PM  Sign when Signing Visit  Assessment/Plan:    Essential hypertension    Hypertension, stable and adequately controlled  Mixed hyperlipidemia    Hyperlipidemia, slightly higher than desirable  I am asking the patient to increase atorvastatin to 10 mg daily  Anxiety    History of anxiety disorder, stable  Chest pain    Atypical chest pain most probably musculoskeletal in origin  We will continue observation and the patient will let me know if the symptoms persist        Diagnoses and all orders for this visit:    Essential hypertension    Mixed hyperlipidemia  -     atorvastatin (LIPITOR) 10 mg tablet; Take 1 tablet (10 mg total) by mouth daily    Anxiety    Chest pain, unspecified type          Subjective:   Precordial aching sensation  Patient ID: Melissa Buck is a 66 y o  male  The patient presented to this office for the purpose of cardiac follow-up  He is known to have history of hypertension and hyperlipidemia  The patient has been feeling generally well  He did however experience precordial aching sensation while he was biking on 2 occasions that persisted for few minutes and subsided as he wound down his trips  He has continued to do the same exercise without recurrent pain he denies any symptoms of shortness of breath  He has no symptoms of palpitation, dizziness or lightheadedness  He has no leg edema        The following portions of the patient's history were reviewed and updated as appropriate: allergies, current medications, past family history, past medical history, past social history, past surgical history and problem list     Review of Systems   Respiratory: Negative for apnea, cough, chest tightness, shortness of breath and wheezing  Cardiovascular: Positive for chest pain (  Atypical)  Negative for palpitations and leg swelling  Gastrointestinal: Negative for abdominal pain  Neurological: Negative for dizziness and light-headedness  Psychiatric/Behavioral: Negative  Objective: stable cardiac-wise  /76 (BP Location: Left arm, Patient Position: Sitting, Cuff Size: Standard)   Pulse 69   Ht 5' 1" (1 549 m)   Wt 68 9 kg (152 lb)   SpO2 98%   BMI 28 72 kg/m²          Physical Exam  Vitals reviewed  Constitutional:       General: He is not in acute distress  Appearance: He is well-developed  He is not diaphoretic  HENT:      Head: Normocephalic  Eyes:      Pupils: Pupils are equal, round, and reactive to light  Neck:      Thyroid: No thyromegaly  Vascular: No JVD  Cardiovascular:      Rate and Rhythm: Normal rate and regular rhythm  Heart sounds: S1 normal and S2 normal  Murmur heard  Crescendo systolic murmur is present with a grade of 1/6  No friction rub  No gallop  Pulmonary:      Effort: Pulmonary effort is normal  No respiratory distress  Breath sounds: Normal breath sounds  No wheezing or rales  Chest:      Chest wall: No tenderness  Abdominal:      Palpations: Abdomen is soft  Musculoskeletal:         General: No tenderness or deformity  Normal range of motion  Cervical back: Normal range of motion  Right lower leg: No edema  Left lower leg: No edema  Skin:     General: Skin is warm and dry  Neurological:      Mental Status: He is alert and oriented to person, place, and time     Psychiatric:         Mood and Affect: Mood normal          Behavior: Behavior normal

## 2022-10-31 NOTE — PATIENT INSTRUCTIONS
The patient is advised to increase the atorvastatin to 10 mg daily  Other medications will remain the same

## 2022-10-31 NOTE — PROGRESS NOTES
Assessment/Plan:    Essential hypertension    Hypertension, stable and adequately controlled  Mixed hyperlipidemia    Hyperlipidemia, slightly higher than desirable  I am asking the patient to increase atorvastatin to 10 mg daily  Anxiety    History of anxiety disorder, stable  Chest pain    Atypical chest pain most probably musculoskeletal in origin  We will continue observation and the patient will let me know if the symptoms persist        Diagnoses and all orders for this visit:    Essential hypertension    Mixed hyperlipidemia  -     atorvastatin (LIPITOR) 10 mg tablet; Take 1 tablet (10 mg total) by mouth daily    Anxiety    Chest pain, unspecified type          Subjective:   Precordial aching sensation  Patient ID: Sherry Rajan is a 66 y o  male  The patient presented to this office for the purpose of cardiac follow-up  He is known to have history of hypertension and hyperlipidemia  The patient has been feeling generally well  He did however experience precordial aching sensation while he was biking on 2 occasions that persisted for few minutes and subsided as he wound down his trips  He has continued to do the same exercise without recurrent pain he denies any symptoms of shortness of breath  He has no symptoms of palpitation, dizziness or lightheadedness  He has no leg edema  The following portions of the patient's history were reviewed and updated as appropriate: allergies, current medications, past family history, past medical history, past social history, past surgical history and problem list     Review of Systems   Respiratory: Negative for apnea, cough, chest tightness, shortness of breath and wheezing  Cardiovascular: Positive for chest pain (  Atypical)  Negative for palpitations and leg swelling  Gastrointestinal: Negative for abdominal pain  Neurological: Negative for dizziness and light-headedness  Psychiatric/Behavioral: Negative            Objective: stable cardiac-wise  /76 (BP Location: Left arm, Patient Position: Sitting, Cuff Size: Standard)   Pulse 69   Ht 5' 1" (1 549 m)   Wt 68 9 kg (152 lb)   SpO2 98%   BMI 28 72 kg/m²          Physical Exam  Vitals reviewed  Constitutional:       General: He is not in acute distress  Appearance: He is well-developed  He is not diaphoretic  HENT:      Head: Normocephalic  Eyes:      Pupils: Pupils are equal, round, and reactive to light  Neck:      Thyroid: No thyromegaly  Vascular: No JVD  Cardiovascular:      Rate and Rhythm: Normal rate and regular rhythm  Heart sounds: S1 normal and S2 normal  Murmur heard  Crescendo systolic murmur is present with a grade of 1/6  No friction rub  No gallop  Pulmonary:      Effort: Pulmonary effort is normal  No respiratory distress  Breath sounds: Normal breath sounds  No wheezing or rales  Chest:      Chest wall: No tenderness  Abdominal:      Palpations: Abdomen is soft  Musculoskeletal:         General: No tenderness or deformity  Normal range of motion  Cervical back: Normal range of motion  Right lower leg: No edema  Left lower leg: No edema  Skin:     General: Skin is warm and dry  Neurological:      Mental Status: He is alert and oriented to person, place, and time     Psychiatric:         Mood and Affect: Mood normal          Behavior: Behavior normal

## 2022-12-21 ENCOUNTER — OFFICE VISIT (OUTPATIENT)
Dept: FAMILY MEDICINE CLINIC | Facility: CLINIC | Age: 72
End: 2022-12-21

## 2022-12-21 VITALS
HEIGHT: 62 IN | HEART RATE: 63 BPM | DIASTOLIC BLOOD PRESSURE: 76 MMHG | TEMPERATURE: 98.7 F | RESPIRATION RATE: 16 BRPM | OXYGEN SATURATION: 100 % | SYSTOLIC BLOOD PRESSURE: 142 MMHG | WEIGHT: 153.4 LBS | BODY MASS INDEX: 28.23 KG/M2

## 2022-12-21 DIAGNOSIS — Z00.00 MEDICARE ANNUAL WELLNESS VISIT, SUBSEQUENT: Primary | ICD-10-CM

## 2022-12-21 DIAGNOSIS — Z12.5 SCREENING FOR PROSTATE CANCER: ICD-10-CM

## 2022-12-21 DIAGNOSIS — E03.9 ACQUIRED HYPOTHYROIDISM: ICD-10-CM

## 2022-12-21 PROBLEM — R51.9 HEADACHE: Status: RESOLVED | Noted: 2017-06-23 | Resolved: 2022-12-21

## 2022-12-21 PROBLEM — F41.9 ANXIETY: Status: RESOLVED | Noted: 2021-04-06 | Resolved: 2022-12-21

## 2022-12-21 PROBLEM — H26.9 CATARACT OF LEFT EYE: Status: RESOLVED | Noted: 2021-12-14 | Resolved: 2022-12-21

## 2022-12-21 NOTE — PROGRESS NOTES
Assessment and Plan:     Problem List Items Addressed This Visit        Endocrine    Acquired hypothyroidism    Relevant Orders    TSH, 3rd generation with Free T4 reflex       Other    Screening for prostate cancer    Relevant Orders    PSA, Total Screen    Medicare annual wellness visit, subsequent - Primary     Had colonoscopy scheduled  Labs for spring            Depression Screening and Follow-up Plan: Patient was screened for depression during today's encounter  They screened negative with a PHQ-2 score of 0  Preventive health issues were discussed with patient, and age appropriate screening tests were ordered as noted in patient's After Visit Summary  Personalized health advice and appropriate referrals for health education or preventive services given if needed, as noted in patient's After Visit Summary  History of Present Illness:     Patient presents for a Medicare Wellness Visit    Here for awv  Weight gain 10 pounds  Reacts to flu shot-declines this year  Colonoscopy scheduled for beginning of the year  bp related to caffeine this morning    Hypertension  This is a chronic problem  The current episode started more than 1 year ago  The problem has been waxing and waning since onset  The problem is controlled  Associated symptoms include chest pain (chest pressure at times-had cardiac workup)  There are no associated agents to hypertension  Risk factors for coronary artery disease include dyslipidemia  Past treatments include angiotensin blockers  The current treatment provides mild improvement  Patient Care Team:  Kasia Moreira DO as PCP - General (Family Medicine)  Ashly Burton MD (Inactive)  Levi Hensley MD (Colon and Rectal Surgery)     Review of Systems:     Review of Systems   Constitutional: Negative  HENT: Negative  Eyes: Negative  Respiratory: Negative  Cardiovascular: Positive for chest pain (chest pressure at times-had cardiac workup)     Gastrointestinal: Negative  Endocrine: Negative  Genitourinary: Negative  Musculoskeletal: Positive for arthralgias (left knee)  Skin: Negative  Allergic/Immunologic: Negative  Neurological: Negative  Hematological: Negative  Problem List:     Patient Active Problem List   Diagnosis   • Roberts's esophagus   • Acquired hypothyroidism   • Chronic GERD   • Essential hypertension   • Mixed hyperlipidemia   • Encounter for Medicare annual wellness exam   • ETD (Eustachian tube dysfunction), bilateral   • Sensorineural hearing loss (SNHL), bilateral   • Osteoarthrosis, localized, primary, knee, right   • Primary localized osteoarthritis of left knee   • Screening for prostate cancer   • Chest pain   • Bilateral primary osteoarthritis of knee   • Medicare annual wellness visit, subsequent   • Chronic pain of both knees   • Hand lesion      Past Medical and Surgical History:     Past Medical History:   Diagnosis Date   • Acute non-recurrent maxillary sinusitis 4/15/2019   • Angina effort    • Anxiety    • Roberts's esophagus    • Bleeding from left ear 4/26/2019   • Chest pain 11/7/2019   • Chest pain, unspecified    • Cough 2/13/2018   • GERD (gastroesophageal reflux disease)    • Hyperlipidemia    • Hypertension    • Impacted cerumen of right ear 4/25/2019   • Macular hole, right eye 1/28/2019    Last Assessment & Plan:  Impression:  1  Macular hole, right eye (dx 11/2018 ~295 microns, now 391 micron) 2  PP OD (10/2018) 3  Cataract OS   Plan: - Second opinion   Lives in [place] with plan for surgery at [address] [place] [address] getting the surgery as the best way to fix the [address] - Follow up as needed   • Migraine    • Mild depression 11/26/2019   • Otorrhea, left 4/25/2019   • SBO (small bowel obstruction) ([facility] [address] ) 3/23/2019     Past Surgical History:   Procedure Laterality Date   • CATARACT EXTRACTION     • EYE SURGERY     • HERNIA REPAIR Right 2014      Family History:     Family History   Problem Relation Age of Onset   • No Known Problems Mother    • Heart attack Father    • Breast cancer Paternal Aunt       Social History:     Social History     Socioeconomic History   • Marital status: /Civil Union     Spouse name: None   • Number of children: None   • Years of education: None   • Highest education level: None   Occupational History   • None   Tobacco Use   • Smoking status: Former     Packs/day: 0 25     Years: 4 00     Pack years: 1 00     Types: Cigarettes     Quit date:      Years since quittin 0   • Smokeless tobacco: Never   Substance and Sexual Activity   • Alcohol use: Yes   • Drug use: Never   • Sexual activity: Yes     Partners: Female   Other Topics Concern   • None   Social History Narrative   • None     Social Determinants of Health     Financial Resource Strain: Low Risk    • Difficulty of Paying Living Expenses: Not hard at all   Food Insecurity: Not on file   Transportation Needs: No Transportation Needs   • Lack of Transportation (Medical): No   • Lack of Transportation (Non-Medical):  No   Physical Activity: Not on file   Stress: Not on file   Social Connections: Not on file   Intimate Partner Violence: Not on file   Housing Stability: Not on file      Medications and Allergies:     Current Outpatient Medications   Medication Sig Dispense Refill   • aspirin 81 MG tablet Take by mouth     • atorvastatin (LIPITOR) 10 mg tablet Take 1 tablet (10 mg total) by mouth daily 90 tablet 3   • esomeprazole (NexIUM) 20 mg capsule Take 20 mg by mouth every other day      • latanoprost (XALATAN) 0 005 % ophthalmic solution Administer 1 drop to both eyes      • levothyroxine 50 mcg tablet Take 1 tablet (50 mcg total) by mouth daily Take on an empty stomach 90 tablet 3   • losartan (COZAAR) 100 MG tablet Take 1 tablet (100 mg total) by mouth daily 90 tablet 3   • rizatriptan (MAXALT) 10 MG tablet TAKE 1 TABLET BY MOUTH ONCE AS NEEDED FOR MIGRIANE FOR UP TO 1 DOSE  MAY REPEAT IN 2 HOURS IF NEEDED 9 tablet 0   • timolol (TIMOPTIC) 0 5 % ophthalmic solution INSTILL 1 DROP IN [facility] EYE TWO TIMES DAILY     • brinzolamide (AZOPT) 1 % ophthalmic suspension  (Patient not taking: Reported on 12/21/2022)     • clindamycin (CLEOCIN) 150 mg capsule TAKE 1 CAPSULE BY MOUTH FOUR TIMES DAILY (Patient not taking: Reported on 6/15/2022)     • fluticasone (FLONASE) 50 mcg/act nasal spray 1 spray into each nostril daily for 7 days 16 g 5   • ofloxacin (OCUFLOX) 0 3 % ophthalmic solution INSTILL 1 DROP IN THE LEFT EYE FOUR TIMES DAILY START 1 DAY PRIOR TO SURGERY       No current facility-administered medications for this visit  Allergies   Allergen Reactions   • Clindamycin GI Intolerance     Per Patient   • Doxycycline Other (See Comments)     unknown   • Influenza Vaccines Other (See Comments)     Flu like symptoms    • Nitrous Oxide       Immunizations:     Immunization History   Administered Date(s) Administered   • COVID-19 PFIZER VACCINE 0 3 ML IM 02/04/2021, 02/24/2021, 10/09/2021   • COVID-19 Pfizer vac (Vince-sucrose, gray cap) 12 yr+ IM 04/23/2022   • Hep A, adult 06/30/2017   • Influenza Split High Dose Preservative Free IM 09/30/2014, 11/02/2015, 12/03/2017   • Pneumococcal Conjugate 13-Valent 08/28/2017   • Pneumococcal Polysaccharide PPV23 05/30/2014   • Tdap 02/15/2018   • Zoster 06/24/2014      Health Maintenance:         Topic Date Due   • Colorectal Cancer Screening  08/19/2020   • Hepatitis C Screening  Completed         Topic Date Due   • Hepatitis B Vaccine (1 of 3 - 3-dose series) Never done      Medicare Screening Tests and Risk Assessments:     Katie Valdes is here for his Subsequent Wellness visit  Health Risk Assessment:   Patient rates overall health as very good  Patient feels that their physical health rating is same  Patient is very satisfied with their life  Eyesight was rated as same  Hearing was rated as same  Patient feels that their emotional and mental health rating is same   Patients states they are never, rarely angry  Patient states they are sometimes unusually tired/fatigued  Pain experienced in the last 7 days has been some  Patient's pain rating has been 1/10  Patient states that he has experienced no weight loss or gain in last 6 months  have gained a few pounds that doesn't go away despite heavy exercise    Depression Screening:   PHQ-2 Score: 0      Fall Risk Screening: In the past year, patient has experienced: no history of falling in past year      Home Safety:  Patient has trouble with stairs inside or outside of their home  Patient has working smoke alarms and has no working carbon monoxide detector  Home safety hazards include: none  Nutrition:   Current diet is Regular and Limited junk food  a lot of fruits, trying to limit salt    Medications:   Patient is not currently taking any over-the-counter supplements  Patient is able to manage medications  Activities of Daily Living (ADLs)/Instrumental Activities of Daily Living (IADLs):   Walk and transfer into and out of bed and chair?: Yes  Dress and groom yourself?: Yes    Bathe or shower yourself?: Yes    Feed yourself?  Yes  Do your laundry/housekeeping?: Yes  Manage your money, pay your bills and track your expenses?: Yes  Make your own meals?: Yes    Do your own shopping?: Yes    Durable Medical Equipment Suppliers  none    Previous Hospitalizations:   Any hospitalizations or ED visits within the last 12 months?: No      Advance Care Planning:   Living will: Yes    Durable POA for healthcare: No    Advanced directive: Yes      Cognitive Screening:   Provider or family/friend/caregiver concerned regarding cognition?: No    PREVENTIVE SCREENINGS      Cardiovascular Screening:    General: Screening Not Indicated and History Lipid Disorder      Diabetes Screening:     General: Screening Current      Colorectal Cancer Screening:     General: Screening Current      Abdominal Aortic Aneurysm (AAA) Screening:    Risk factors include: age between 73-69 yo and tobacco use        Lung Cancer Screening:     General: Screening Not Indicated      Hepatitis C Screening:    General: Screening Current    Screening, Brief Intervention, and Referral to Treatment (SBIRT)    Screening  Typical number of drinks in a day: 0  Typical number of drinks in a week: 2  Interpretation: Low risk drinking behavior  AUDIT-C Screenin) How often did you have a drink containing alcohol in the past year? 2 to 4 times a month  2) How many drinks did you have on a typical day when you were drinking in the past year? 0  3) How often did you have 6 or more drinks on one occasion in the past year? never    AUDIT-C Score: 2  Interpretation: Score 0-3 (male): Negative screen for alcohol misuse    Single Item Drug Screening:  How often have you used an illegal drug (including marijuana) or a prescription medication for non-medical reasons in the past year? never    Single Item Drug Screen Score: 0  Interpretation: Negative screen for possible drug use disorder    No results found  Physical Exam:     /76   Pulse 63   Temp 98 7 °F (37 1 °C) (Tympanic)   Resp 16   Ht 5' 1 65" (1 566 m)   Wt 69 6 kg (153 lb 6 4 oz)   SpO2 100%   BMI 28 37 kg/m²     Physical Exam  Vitals and nursing note reviewed  Constitutional:       Appearance: He is well-developed  HENT:      Head: Normocephalic and atraumatic  Right Ear: Tympanic membrane, ear canal and external ear normal       Left Ear: Tympanic membrane, ear canal and external ear normal       Nose: Nose normal    Eyes:      Extraocular Movements: Extraocular movements intact  Conjunctiva/sclera: Conjunctivae normal       Pupils: Pupils are equal, round, and reactive to light  Cardiovascular:      Rate and Rhythm: Normal rate and regular rhythm  Heart sounds: Normal heart sounds  Pulmonary:      Effort: Pulmonary effort is normal       Breath sounds: Normal breath sounds     Abdominal:      General: Abdomen is flat  Bowel sounds are normal       Palpations: Abdomen is soft  Musculoskeletal:         General: Normal range of motion  Cervical back: Normal range of motion and neck supple  Skin:     General: Skin is warm and dry  Capillary Refill: Capillary refill takes less than 2 seconds  Neurological:      Mental Status: He is alert and oriented to person, place, and time  Psychiatric:         Behavior: Behavior normal          Thought Content:  Thought content normal          Judgment: Judgment normal           Aletha Ceron DO

## 2022-12-21 NOTE — PATIENT INSTRUCTIONS
Medicare Preventive Visit Patient Instructions  Thank you for completing your Welcome to Medicare Visit or Medicare Annual Wellness Visit today  Your next wellness visit will be due in one year (12/22/2023)  The screening/preventive services that you may require over the next 5-10 years are detailed below  Some tests may not apply to you based off risk factors and/or age  Screening tests ordered at today's visit but not completed yet may show as past due  Also, please note that scanned in results may not display below  Preventive Screenings:  Service Recommendations Previous Testing/Comments   Colorectal Cancer Screening  · Colonoscopy    · Fecal Occult Blood Test (FOBT)/Fecal Immunochemical Test (FIT)  · Fecal DNA/Cologuard Test  · Flexible Sigmoidoscopy Age: 39-70 years old   Colonoscopy: every 10 years (May be performed more frequently if at higher risk)  OR  FOBT/FIT: every 1 year  OR  Cologuard: every 3 years  OR  Sigmoidoscopy: every 5 years  Screening may be recommended earlier than age 39 if at higher risk for colorectal cancer  Also, an individualized decision between you and your healthcare provider will decide whether screening between the ages of 74-80 would be appropriate   Colonoscopy: 08/17/2020  FOBT/FIT: Not on file  Cologuard: 08/17/2020  Sigmoidoscopy: Not on file    Screening Current     Prostate Cancer Screening Individualized decision between patient and health care provider in men between ages of 53-78   Medicare will cover every 12 months beginning on the day after your 50th birthday PSA: 2 0 ng/mL           Hepatitis C Screening Once for adults born between 1945 and 1965  More frequently in patients at high risk for Hepatitis C Hep C Antibody: 09/13/2017    Screening Current   Diabetes Screening 1-2 times per year if you're at risk for diabetes or have pre-diabetes Fasting glucose: 93 mg/dL (10/13/2022)  A1C: No results in last 5 years (No results in last 5 years)  Screening Current Cholesterol Screening Once every 5 years if you don't have a lipid disorder  May order more often based on risk factors  Lipid panel: 10/13/2022  Screening Not Indicated  History Lipid Disorder      Other Preventive Screenings Covered by Medicare:  1  Abdominal Aortic Aneurysm (AAA) Screening: covered once if your at risk  You're considered to be at risk if you have a family history of AAA or a male between the age of 73-68 who smoking at least 100 cigarettes in your lifetime  2  Lung Cancer Screening: covers low dose CT scan once per year if you meet all of the following conditions: (1) Age 50-69; (2) No signs or symptoms of lung cancer; (3) Current smoker or have quit smoking within the last 15 years; (4) You have a tobacco smoking history of at least 20 pack years (packs per day x number of years you smoked); (5) You get a written order from a healthcare provider  3  Glaucoma Screening: covered annually if you're considered high risk: (1) You have diabetes OR (2) Family history of glaucoma OR (3)  aged 48 and older OR (3)  American aged 72 and older  3  Osteoporosis Screening: covered every 2 years if you meet one of the following conditions: (1) Have a vertebral abnormality; (2) On glucocorticoid therapy for more than 3 months; (3) Have primary hyperparathyroidism; (4) On osteoporosis medications and need to assess response to drug therapy  5  HIV Screening: covered annually if you're between the age of 12-76  Also covered annually if you are younger than 13 and older than 72 with risk factors for HIV infection  For pregnant patients, it is covered up to 3 times per pregnancy      Immunizations:  Immunization Recommendations   Influenza Vaccine Annual influenza vaccination during flu season is recommended for all persons aged >= 6 months who do not have contraindications   Pneumococcal Vaccine   * Pneumococcal conjugate vaccine = PCV13 (Prevnar 13), PCV15 (Vaxneuvance), PCV20 (Prevnar 20)  * Pneumococcal polysaccharide vaccine = PPSV23 (Pneumovax) Adults 2364 years old: 1-3 doses may be recommended based on certain risk factors  Adults 72 years old: 1-2 doses may be recommended based off what pneumonia vaccine you previously received   Hepatitis B Vaccine 3 dose series if at intermediate or high risk (ex: diabetes, end stage renal disease, liver disease)   Tetanus (Td) Vaccine - COST NOT COVERED BY MEDICARE PART B Following completion of primary series, a booster dose should be given every 10 years to maintain immunity against tetanus  Td may also be given as tetanus wound prophylaxis  Tdap Vaccine - COST NOT COVERED BY MEDICARE PART B Recommended at least once for all adults  For pregnant patients, recommended with each pregnancy  Shingles Vaccine (Shingrix) - COST NOT COVERED BY MEDICARE PART B  2 shot series recommended in those aged 48 and above     Health Maintenance Due:      Topic Date Due   • Colorectal Cancer Screening  08/19/2020   • Hepatitis C Screening  Completed     Immunizations Due:      Topic Date Due   • Hepatitis B Vaccine (1 of 3 - 3-dose series) Never done     Advance Directives   What are advance directives? Advance directives are legal documents that state your wishes and plans for medical care  These plans are made ahead of time in case you lose your ability to make decisions for yourself  Advance directives can apply to any medical decision, such as the treatments you want, and if you want to donate organs  What are the types of advance directives? There are many types of advance directives, and each state has rules about how to use them  You may choose a combination of any of the following:  · Living will: This is a written record of the treatment you want  You can also choose which treatments you do not want, which to limit, and which to stop at a certain time  This includes surgery, medicine, IV fluid, and tube feedings     · Durable power of  for [facility]): This is a written record that states who you want to make healthcare choices for you when you are unable to make them for yourself  This person, called a proxy, is usually a family member or a friend  You may choose more than 1 proxy  · Do not resuscitate (DNR) order:  A DNR order is used in case your heart stops beating or you stop breathing  It is a request not to have certain forms of treatment, such as CPR  A DNR order may be included in other types of advance directives  · Medical directive: This covers the care that you want if you are in a coma, near death, or unable to make decisions for yourself  You can list the treatments you want for each condition  Treatment may include pain medicine, surgery, blood transfusions, dialysis, IV or tube feedings, and a ventilator (breathing machine)  · Values history: This document has questions about your views, beliefs, and how you feel and think about life  This information can help others choose the care that you would choose  Why are advance directives important? An advance directive helps you control your care  Although spoken wishes may be used, it is better to have your wishes written down  Spoken wishes can be misunderstood, or not followed  Treatments may be given even if you do not want them  An advance directive may make it easier for your family to make difficult choices about your care  Weight Management   Why it is important to manage your weight:  Being overweight increases your risk of health conditions such as heart disease, high blood pressure, type 2 diabetes, and certain types of cancer  It can also increase your risk for osteoarthritis, sleep apnea, and other respiratory problems  Aim for a slow, steady weight loss  Even a small amount of weight loss can lower your risk of health problems  How to lose weight safely:  A safe and healthy way to lose weight is to eat fewer calories and get regular exercise   You can lose up about 1 pound a week by decreasing the number of calories you eat by 500 calories each day  Healthy meal plan for weight management:  A healthy meal plan includes a variety of foods, contains fewer calories, and helps you stay healthy  A healthy meal plan includes the following:  · Eat whole-grain foods more often  A healthy meal plan should contain fiber  Fiber is the part of grains, fruits, and vegetables that is not broken down by your body  Whole-grain foods are healthy and provide extra fiber in your diet  Some examples of whole-grain foods are whole-wheat breads and pastas, oatmeal, brown rice, and bulgur  · Eat a variety of vegetables every day  Include dark, leafy greens such as spinach, kale, fabien greens, and mustard greens  Eat yellow and orange vegetables such as carrots, sweet potatoes, and winter squash  · Eat a variety of fruits every day  Choose fresh or canned fruit (canned in its own juice or light syrup) instead of juice  Fruit juice has very little or no fiber  · Eat low-fat dairy foods  Drink fat-free (skim) milk or 1% milk  Eat fat-free yogurt and low-fat cottage cheese  Try low-fat cheeses such as mozzarella and other reduced-fat cheeses  · Choose meat and other protein foods that are low in fat  Choose beans or other legumes such as split peas or lentils  Choose fish, skinless poultry (chicken or turkey), or lean cuts of red meat (beef or pork)  Before you cook meat or poultry, cut off any visible fat  · Use less fat and oil  Try baking foods instead of frying them  Add less fat, such as margarine, sour cream, regular salad dressing and mayonnaise to foods  Eat fewer high-fat foods  Some examples of high-fat foods include french fries, doughnuts, ice cream, and cakes  · Eat fewer sweets  Limit foods and drinks that are high in sugar  This includes candy, cookies, regular soda, and sweetened drinks  Exercise:  Exercise at least 30 minutes per day on most days of the week  Some examples of exercise include walking, biking, dancing, and swimming  You can also fit in more physical activity by taking the stairs instead of the elevator or parking farther away from stores  Ask your healthcare provider about the best exercise plan for you  © Copyright [employer] 2018 Information is for End User's use only and may not be sold, redistributed or otherwise used for commercial purposes   All illustrations and images included in CareNotes® are the copyrighted property of [employer]  or [address] [place] [address]

## 2023-01-14 ENCOUNTER — APPOINTMENT (OUTPATIENT)
Dept: LAB | Age: 73
End: 2023-01-14

## 2023-01-14 DIAGNOSIS — E03.9 ACQUIRED HYPOTHYROIDISM: ICD-10-CM

## 2023-01-14 DIAGNOSIS — Z12.5 SCREENING FOR PROSTATE CANCER: ICD-10-CM

## 2023-01-14 LAB
PSA SERPL-MCNC: 2.4 NG/ML (ref 0–4)
TSH SERPL DL<=0.05 MIU/L-ACNC: 2.49 UIU/ML (ref 0.45–4.5)

## 2023-01-18 ENCOUNTER — PROBLEM (OUTPATIENT)
Dept: URBAN - METROPOLITAN AREA CLINIC 6 | Facility: CLINIC | Age: 73
End: 2023-01-18

## 2023-01-18 DIAGNOSIS — H35.341: ICD-10-CM

## 2023-01-18 DIAGNOSIS — H35.371: ICD-10-CM

## 2023-01-18 DIAGNOSIS — H26.491: ICD-10-CM

## 2023-01-18 DIAGNOSIS — H40.1132: ICD-10-CM

## 2023-01-18 PROCEDURE — 92014 COMPRE OPH EXAM EST PT 1/>: CPT

## 2023-01-18 PROCEDURE — 92134 CPTRZ OPH DX IMG PST SGM RTA: CPT

## 2023-01-18 ASSESSMENT — VISUAL ACUITY
OD_SC: 20/40-2
OD_PH: 20/30
OS_SC: 20/20

## 2023-01-18 ASSESSMENT — KERATOMETRY
OD_K2POWER_DIOPTERS: 42.50
OS_K2POWER_DIOPTERS: 43.25
OD_K1POWER_DIOPTERS: 42.00
OD_AXISANGLE2_DEGREES: 120
OS_AXISANGLE2_DEGREES: 75
OS_K1POWER_DIOPTERS: 42.25
OD_AXISANGLE_DEGREES: 030
OS_AXISANGLE_DEGREES: 165

## 2023-01-18 ASSESSMENT — TONOMETRY
OS_IOP_MMHG: 22
OD_IOP_MMHG: 21

## 2023-01-22 ENCOUNTER — NURSE TRIAGE (OUTPATIENT)
Dept: OTHER | Facility: OTHER | Age: 73
End: 2023-01-22

## 2023-01-22 NOTE — TELEPHONE ENCOUNTER
Regarding: covid positive concern  ----- Message from Jet Pino sent at 1/22/2023 11:31 AM EST -----  " I just tested positive for covid, what should I do from here ?"

## 2023-01-22 NOTE — TELEPHONE ENCOUNTER
Reason for Disposition  • Earache    Additional Information  • Negative: [1] Sinus congestion (pressure, fullness) AND [2] present > 10 days    Answer Assessment - Initial Assessment Questions  1  COVID-19 DIAGNOSIS: "Who made your COVID-19 diagnosis?" "Was it confirmed by a positive lab test or self-test?" If not diagnosed by a doctor (or NP/PA), ask "Are there lots of cases (community spread) where you live?" Note: See public health department website, if unsure  Patient started with the covid symptoms a week ago, tested positive for Covid today   3  ONSET: "When did the COVID-19 symptoms start?"       A week ago, 1/5/23   4  WORST SYMPTOM: "What is your worst symptom?" (e g , cough, fever, shortness of breath, muscle aches)      Sinus infection symptoms   5  COUGH: "Do you have a cough?" If Yes, ask: "How bad is the cough?"        Mild cough   6  FEVER: "Do you have a fever?" If Yes, ask: "What is your temperature, how was it measured, and when did it start?"      No   7  RESPIRATORY STATUS: "Describe your breathing?" (e g , shortness of breath, wheezing, unable to speak)       Normal   8  BETTER-SAME-WORSE: "Are you getting better, staying the same or getting worse compared to yesterday?"  If getting worse, ask, "In what way?"      Sinus symptoms became worse   9  HIGH RISK DISEASE: "Do you have any chronic medical problems?" (e g , asthma, heart or lung disease, weak immune system, obesity, etc )      No   10  VACCINE: "Have you had the COVID-19 vaccine?" If Yes, ask: "Which one, how many shots, when did you get it?"        Pfizer   11  BOOSTER: "Have you received your COVID-19 booster?" If Yes, ask: "Which one and when did you get it?"        2 boosters   13  OTHER SYMPTOMS: "Do you have any other symptoms?"  (e g , chills, fatigue, headache, loss of smell or taste, muscle pain, sore throat)        Earache,  pressure in sinus area     14  O2 SATURATION MONITOR:  "Do you use an oxygen saturation monitor (pulse oximeter) at home?" If Yes, ask "What is your reading (oxygen level) today?" "What is your usual oxygen saturation reading?" (e g , 95%)        No    Protocols used: SINUS PAIN OR CONGESTION-ADULT-AH, CORONAVIRUS (COVID-19) DIAGNOSED OR SUSPECTED-ADULT-AH

## 2023-01-23 ENCOUNTER — TELEMEDICINE (OUTPATIENT)
Dept: FAMILY MEDICINE CLINIC | Facility: CLINIC | Age: 73
End: 2023-01-23

## 2023-01-23 VITALS — SYSTOLIC BLOOD PRESSURE: 160 MMHG | OXYGEN SATURATION: 99 % | HEART RATE: 59 BPM | DIASTOLIC BLOOD PRESSURE: 78 MMHG

## 2023-01-23 DIAGNOSIS — U07.1 COVID-19: Primary | ICD-10-CM

## 2023-01-23 NOTE — PROGRESS NOTES
COVID-19 Outpatient Progress Note    Assessment/Plan:    Problem List Items Addressed This Visit        Other    COVID-19 - Primary     Start vitamins  Continue monitor pulse ox  Day 7 now           Disposition:     I have spent 10 minutes directly with the patient  Outside window of treatment      Encounter provider: Marika Pavon DO     Provider located at: [address] [address]     Recent Visits  No visits were found meeting these conditions  Showing recent visits within past 7 days and meeting all other requirements  Today's Visits  Date Type Provider Dept   01/23/23 Telemedicine Marika Pavon DO [facility]   Showing today's visits and meeting all other requirements  Future Appointments  No visits were found meeting these conditions  Showing future appointments within next 150 days and meeting all other requirements     This virtual check-in was done via [employer] and patient was informed that this is a secure, HIPAA-compliant platform  He agrees to proceed  Patient agrees to participate in a virtual check in via telephone or video visit instead of presenting to the office to address urgent/immediate medical needs  Patient is aware this is a billable service  He acknowledged consent and understanding of privacy and security of the video platform  The patient has agreed to participate and understands they can discontinue the visit at any time  After connecting through [facility], the patient was identified by name and date of birth  Bea Ramírez was informed that this was a telemedicine visit and that the exam was being conducted confidentially over secure lines  My office door was closed  No one else was in the room  Bea Ramírez acknowledged consent and understanding of privacy and security of the telemedicine visit   I informed the patient that I have reviewed his record in Epic and presented the opportunity for him to ask any questions regarding the visit today  The patient agreed to participate  Verification of patient location:  Patient is located in the following state in which I hold an active license: [place]    Subjective:   Angelica Brandt is a 74 y o  male who is concerned about COVID-19  Patient's symptoms include fatigue, rhinorrhea, cough and myalgias  Patient denies fever, chills, congestion, sore throat, anosmia, loss of taste, nausea, vomiting, diarrhea and headaches  - Date of symptom onset: 1/17/2023  - Date of exposure: 1/14/2023      COVID-19 vaccination status: Fully vaccinated with booster    Exposure:   Contact with a person who is under investigation (PUI) for or who is positive for COVID-19 within the last 14 days?: Yes    Hospitalized recently for fever and/or lower respiratory symptoms?: No      Currently a healthcare worker that is involved in direct patient care?: No      Works in a special setting where the risk of COVID-19 transmission may be high? (this may include long-term care, correctional and group home facilities; homeless shelters; assisted-living facilities and group homes ): No      Resident in a special setting where the risk of COVID-19 transmission may be high? (this may include long-term care, correctional and group home facilities; homeless shelters; assisted-living facilities and group homes ): No      Tested positive yesterday    Lab Results   Component Value Date    SARSCOV2 Negative 01/25/2022       Review of Systems   Constitutional: Positive for fatigue  Negative for chills and fever  HENT: Positive for rhinorrhea  Negative for congestion and sore throat  Eyes: Negative  Respiratory: Positive for cough  Gastrointestinal: Negative for diarrhea, nausea and vomiting  Endocrine: Negative  Genitourinary: Negative  Musculoskeletal: Positive for myalgias  Allergic/Immunologic: Negative  Neurological: Negative for headaches  Hematological: Negative  Psychiatric/Behavioral: Negative  Current Outpatient Medications on File Prior to Visit   Medication Sig   • aspirin 81 MG tablet Take by mouth   • atorvastatin (LIPITOR) 10 mg tablet Take 1 tablet (10 mg total) by mouth daily   • esomeprazole (NexIUM) 20 mg capsule Take 20 mg by mouth every other day    • latanoprost (XALATAN) 0 005 % ophthalmic solution Administer 1 drop to both eyes    • levothyroxine 50 mcg tablet Take 1 tablet (50 mcg total) by mouth daily Take on an empty stomach   • losartan (COZAAR) 100 MG tablet Take 1 tablet (100 mg total) by mouth daily   • rizatriptan (MAXALT) 10 MG tablet TAKE 1 TABLET BY MOUTH ONCE AS NEEDED FOR MIGRIANE FOR UP TO 1 DOSE  MAY REPEAT IN 2 HOURS IF NEEDED   • timolol (TIMOPTIC) 0 5 % ophthalmic solution INSTILL 1 DROP IN EACH EYE TWO TIMES DAILY   • fluticasone (FLONASE) 50 mcg/act nasal spray 1 spray into each nostril daily for 7 days   • [DISCONTINUED] brinzolamide (AZOPT) 1 % ophthalmic suspension  (Patient not taking: Reported on 12/21/2022)   • [DISCONTINUED] clindamycin (CLEOCIN) 150 mg capsule    • [DISCONTINUED] ofloxacin (OCUFLOX) 0 3 % ophthalmic solution INSTILL 1 DROP IN THE LEFT EYE FOUR TIMES DAILY START 1 DAY PRIOR TO SURGERY       Objective:    /78   Pulse 59   SpO2 99%      Physical Exam  Vitals reviewed  Constitutional:       Appearance: Normal appearance  HENT:      Head: Normocephalic and atraumatic  Eyes:      Extraocular Movements: Extraocular movements intact  Pupils: Pupils are equal, round, and reactive to light  Pulmonary:      Effort: No respiratory distress  Breath sounds: No stridor  Neurological:      General: No focal deficit present  Mental Status: He is alert and oriented to person, place, and time  Psychiatric:         Mood and Affect: Mood normal          Behavior: Behavior normal          Thought Content:  Thought content normal          Judgment: Judgment normal        Sugar Mercer DO

## 2023-01-26 ENCOUNTER — PROCEDURE ONLY (OUTPATIENT)
Dept: URBAN - METROPOLITAN AREA CLINIC 6 | Facility: CLINIC | Age: 73
End: 2023-01-26

## 2023-01-26 DIAGNOSIS — H26.491: ICD-10-CM

## 2023-01-26 DIAGNOSIS — Z96.1: ICD-10-CM

## 2023-01-26 PROCEDURE — 66821 AFTER CATARACT LASER SURGERY: CPT

## 2023-01-26 ASSESSMENT — KERATOMETRY
OS_AXISANGLE2_DEGREES: 75
OS_K1POWER_DIOPTERS: 42.25
OD_K2POWER_DIOPTERS: 42.50
OD_K1POWER_DIOPTERS: 42.00
OD_AXISANGLE_DEGREES: 030
OD_AXISANGLE2_DEGREES: 120
OS_K2POWER_DIOPTERS: 43.25
OS_AXISANGLE_DEGREES: 165

## 2023-01-26 ASSESSMENT — TONOMETRY
OD_IOP_MMHG: 20
OS_IOP_MMHG: 21

## 2023-01-26 ASSESSMENT — VISUAL ACUITY
OD_PH: 20/30+1
OD_GLARE: 20/70
OS_SC: 20/20
OD_SC: 20/40

## 2023-02-17 ENCOUNTER — OFFICE VISIT (OUTPATIENT)
Dept: OBGYN CLINIC | Facility: MEDICAL CENTER | Age: 73
End: 2023-02-17

## 2023-02-17 VITALS
DIASTOLIC BLOOD PRESSURE: 90 MMHG | HEIGHT: 62 IN | WEIGHT: 152.6 LBS | SYSTOLIC BLOOD PRESSURE: 150 MMHG | BODY MASS INDEX: 28.08 KG/M2 | HEART RATE: 60 BPM

## 2023-02-17 DIAGNOSIS — S76.311A STRAIN OF RIGHT HAMSTRING MUSCLE, INITIAL ENCOUNTER: Primary | ICD-10-CM

## 2023-02-17 DIAGNOSIS — M17.11 PRIMARY OSTEOARTHRITIS OF RIGHT KNEE: ICD-10-CM

## 2023-02-17 DIAGNOSIS — M17.12 PRIMARY OSTEOARTHRITIS OF LEFT KNEE: ICD-10-CM

## 2023-02-17 RX ORDER — METHOCARBAMOL 500 MG/1
500 TABLET, FILM COATED ORAL 4 TIMES DAILY
Qty: 30 TABLET | Refills: 1 | Status: SHIPPED | OUTPATIENT
Start: 2023-02-17

## 2023-02-17 RX ORDER — BETAMETHASONE SODIUM PHOSPHATE AND BETAMETHASONE ACETATE 3; 3 MG/ML; MG/ML
12 INJECTION, SUSPENSION INTRA-ARTICULAR; INTRALESIONAL; INTRAMUSCULAR; SOFT TISSUE
Status: COMPLETED | OUTPATIENT
Start: 2023-02-17 | End: 2023-02-17

## 2023-02-17 RX ORDER — BUPIVACAINE HYDROCHLORIDE 2.5 MG/ML
2 INJECTION, SOLUTION INFILTRATION; PERINEURAL
Status: COMPLETED | OUTPATIENT
Start: 2023-02-17 | End: 2023-02-17

## 2023-02-17 RX ORDER — LIDOCAINE HYDROCHLORIDE 10 MG/ML
2 INJECTION, SOLUTION INFILTRATION; PERINEURAL
Status: COMPLETED | OUTPATIENT
Start: 2023-02-17 | End: 2023-02-17

## 2023-02-17 RX ADMIN — BUPIVACAINE HYDROCHLORIDE 2 ML: 2.5 INJECTION, SOLUTION INFILTRATION; PERINEURAL at 14:57

## 2023-02-17 RX ADMIN — BETAMETHASONE SODIUM PHOSPHATE AND BETAMETHASONE ACETATE 12 MG: 3; 3 INJECTION, SUSPENSION INTRA-ARTICULAR; INTRALESIONAL; INTRAMUSCULAR; SOFT TISSUE at 14:57

## 2023-02-17 RX ADMIN — LIDOCAINE HYDROCHLORIDE 2 ML: 10 INJECTION, SOLUTION INFILTRATION; PERINEURAL at 14:57

## 2023-02-17 NOTE — PROGRESS NOTES
Assessment:  1  Strain of right hamstring muscle, initial encounter  Ambulatory referral to Physical Therapy      2  Primary osteoarthritis of left knee  Ambulatory referral to Physical Therapy      3  Primary osteoarthritis of right knee  Ambulatory referral to Physical Therapy          Plan:  Right hamstring strain and left knee osteoarthritis  The patient was provided with left knee steroid injection  The patient tolerated the procedure well  The patient should initiate physical therapy for right hamstring strain  The patient should follow up in 3 months  To do next visit:  Return in about 3 months (around 5/17/2023)  The above stated was discussed in layman's terms and the patient expressed understanding  All questions were answered to the patient's satisfaction  Scribe Attestation    I,:  Tamika Fierro am acting as a scribe while in the presence of the attending physician :       I,:  Marva Sherman MD personally performed the services described in this documentation    as scribed in my presence :             Subjective:   Daljit Carter is a 74 y o  male who presents for follow up of right knee  He is s/p right pes anserine steroid injection with benefit, 10/4/2022  Today he complains of right posterior thigh and calf pain with bilateral generalized knee pain  He rates his pain at 0/10 sitting and 8/10 at its worse  He denies tinlging in legs or low back pain  Climbing stairs aggravates while rest alleviates  He did recently stop Lipitor to see if this helps yet his symptoms have increased  He does bike for exercise          Review of systems negative unless otherwise specified in HPI    Past Medical History:   Diagnosis Date   • Acute non-recurrent maxillary sinusitis 4/15/2019   • Angina effort    • Anxiety    • Roberts's esophagus    • Bleeding from left ear 4/26/2019   • Chest pain 11/7/2019   • Chest pain, unspecified    • Cough 2/13/2018   • GERD (gastroesophageal reflux disease)    • Hyperlipidemia    • Hypertension    • Impacted cerumen of right ear 2019   • Macular hole, right eye 2019    Last Assessment & Plan:  Impression:  1  Macular hole, right eye (dx 2018 ~295 microns, now 391 micron) 2  PP OD (10/2018) 3  Cataract OS   Plan: - Second opinion  Lives in [place] with plan for surgery at [address] [place] [address] getting the surgery as the best way to fix the [address] - Follow up as needed   • Migraine    • Mild depression 2019   • Otorrhea, left 2019   • SBO (small bowel obstruction) ([address] ) 3/23/2019       Past Surgical History:   Procedure Laterality Date   • CATARACT EXTRACTION     • EYE SURGERY     • HERNIA REPAIR Right        Family History   Problem Relation Age of Onset   • No Known Problems Mother    • Heart attack Father    • Breast cancer Paternal Aunt        Social History     Occupational History   • Not on file   Tobacco Use   • Smoking status: Former     Packs/day: 0 25     Years: 4 00     Pack years: 1 00     Types: Cigarettes     Quit date:      Years since quittin 1   • Smokeless tobacco: Never   Vaping Use   • Vaping Use: Never used   Substance and Sexual Activity   • Alcohol use:  Yes   • Drug use: Never   • Sexual activity: Yes     Partners: Female         Current Outpatient Medications:   •  aspirin 81 MG tablet, Take by mouth, Disp: , Rfl:   •  atorvastatin (LIPITOR) 10 mg tablet, Take 1 tablet (10 mg total) by mouth daily, Disp: 90 tablet, Rfl: 3  •  esomeprazole (NexIUM) 20 mg capsule, Take 20 mg by mouth every other day , Disp: , Rfl:   •  latanoprost (XALATAN) 0 005 % ophthalmic solution, Administer 1 drop to both eyes , Disp: , Rfl:   •  levothyroxine 50 mcg tablet, Take 1 tablet (50 mcg total) by mouth daily Take on an empty stomach, Disp: 90 tablet, Rfl: 3  •  losartan (COZAAR) 100 MG tablet, Take 1 tablet (100 mg total) by mouth daily, Disp: 90 tablet, Rfl: 3  •  rizatriptan (MAXALT) 10 MG tablet, TAKE 1 TABLET BY MOUTH ONCE AS NEEDED FOR MIGRIANE FOR UP TO 1 DOSE  MAY REPEAT IN 2 HOURS IF NEEDED, Disp: 9 tablet, Rfl: 0  •  timolol (TIMOPTIC) 0 5 % ophthalmic solution, INSTILL 1 DROP IN [facility] EYE TWO TIMES DAILY, Disp: , Rfl:   •  fluticasone (FLONASE) 50 mcg/act nasal spray, 1 spray into each nostril daily for 7 days, Disp: 16 g, Rfl: 5    Allergies   Allergen Reactions   • Clindamycin GI Intolerance     Per Patient   • Doxycycline Other (See Comments)     unknown   • Influenza Vaccines Other (See Comments)     Flu like symptoms    • Nitrous Oxide             Vitals:    02/17/23 1427   BP: 150/90   Pulse: 60       Objective:  Physical exam  · General: Awake, Alert, Oriented  · Eyes: Pupils equal, round and reactive to light  · Heart: regular rate and rhythm  · Lungs: No audible wheezing  · Abdomen: soft                    Ortho Exam  Right low extremity:  Negative modified straight leg raise  Tight hamstrings  No pain with lumbar flexion  Pain with resisted knee flexion  Calf compartments soft and supple  Sensation intact  Toes are warm sensate and mobile    Left knee:  No erythema or ecchymosis  No effusion or swelling  Normal strength  Good ROM with crepitus   Calf compartments soft and supple  Sensation intact  Toes are warm sensate and mobile        Diagnostics, reviewed and taken today if performed as documented:    None performed    Procedures, if performed today:    Large joint arthrocentesis: L knee  Universal Protocol:  Consent: Verbal consent obtained  Risks and benefits: risks, benefits and alternatives were discussed  Consent given by: patient  Time out: Immediately prior to procedure a "time out" was called to verify the correct patient, procedure, equipment, support staff and site/side marked as required    Timeout called at: 2/17/2023 2:53 PM   Patient understanding: patient states understanding of the procedure being performed  Site marked: the operative site was marked  Patient identity confirmed: verbally with patient    Supporting Documentation  Indications: pain   Procedure Details  Location: knee - L knee  Preparation: Patient was prepped and draped in the usual sterile fashion  Needle size: 22 G  Ultrasound guidance: no  Approach: anterolateral  Medications administered: 12 mg betamethasone acetate-betamethasone sodium phosphate 6 (3-3) mg/mL; 2 mL bupivacaine 0 25 %; 2 mL lidocaine 1 %    Patient tolerance: patient tolerated the procedure well with no immediate complications  Dressing:  Sterile dressing applied          Portions of the record may have been created with voice recognition software  Occasional wrong word or "sound a like" substitutions may have occurred due to the inherent limitations of voice recognition software  Read the chart carefully and recognize, using context, where substitutions have occurred

## 2023-02-22 ENCOUNTER — POST-OP CHECK (OUTPATIENT)
Dept: URBAN - METROPOLITAN AREA CLINIC 6 | Facility: CLINIC | Age: 73
End: 2023-02-22

## 2023-02-22 DIAGNOSIS — Z96.1: ICD-10-CM

## 2023-02-22 PROCEDURE — 99024 POSTOP FOLLOW-UP VISIT: CPT

## 2023-02-22 ASSESSMENT — KERATOMETRY
OS_K1POWER_DIOPTERS: 42.25
OS_K2POWER_DIOPTERS: 43.25
OD_AXISANGLE2_DEGREES: 120
OS_AXISANGLE_DEGREES: 165
OD_AXISANGLE_DEGREES: 030
OD_K1POWER_DIOPTERS: 42.00
OD_K2POWER_DIOPTERS: 42.50
OS_AXISANGLE2_DEGREES: 75

## 2023-02-22 ASSESSMENT — TONOMETRY
OS_IOP_MMHG: 18
OD_IOP_MMHG: 18

## 2023-02-22 ASSESSMENT — VISUAL ACUITY
OS_SC: 20/20
OD_SC: 20/30+1

## 2023-02-27 ENCOUNTER — EVALUATION (OUTPATIENT)
Dept: PHYSICAL THERAPY | Age: 73
End: 2023-02-27

## 2023-02-27 DIAGNOSIS — M17.12 PRIMARY OSTEOARTHRITIS OF LEFT KNEE: ICD-10-CM

## 2023-02-27 DIAGNOSIS — M17.11 PRIMARY OSTEOARTHRITIS OF RIGHT KNEE: ICD-10-CM

## 2023-02-27 DIAGNOSIS — M54.16 LUMBAR RADICULOPATHY: Primary | ICD-10-CM

## 2023-02-27 DIAGNOSIS — S76.311A STRAIN OF RIGHT HAMSTRING MUSCLE, INITIAL ENCOUNTER: ICD-10-CM

## 2023-02-27 NOTE — PROGRESS NOTES
PT Evaluation     Today's date: 2023  Patient name: Bebeto Quintanilla  : 1950  MRN: [id number]  Referring provider: Ross Tolentino,*  Dx:   Encounter Diagnosis     ICD-10-CM    1  Lumbar radiculopathy  M54 16       2  Strain of right hamstring muscle, initial encounter  [phone] Ambulatory referral to Physical Therapy      3  Primary osteoarthritis of left knee  M17 12 Ambulatory referral to Physical Therapy      4  Primary osteoarthritis of right knee  M17 11 Ambulatory referral to Physical Therapy                     Assessment  Assessment details: Pt is a 74year old male who presents to PT with R sided LBP and B/L knee pain which began 2 months ago  Pt was recently referred and treated for a hamstring strain which was resolved following pharmacological intervention  As pt has resumed activity due to improved LE symptoms, pt began experiencing lumbar pain  Pt has pain with extended periods of lumbar flexion and is limited in lumbar motion, which is limiting his ability to perform work activities and biking  Pt would benefit from skilled PT in order to improve function in ADLs and exercising  Impairments: abnormal coordination, abnormal muscle firing, abnormal or restricted ROM, abnormal movement, lacks appropriate home exercise program, weight-bearing intolerance, poor posture  and poor body mechanics    Goals  STG:  Pt will improve lumbar extension ROM by 25% within 3 weeks  Pt will demonstrate understanding of phase 1 HEP within 3 weeks  LTG:  Pt will improve lumbar extension ROM by 50% by discharge  Pt will demonstrate understanding of phase 2 HEP by discharge      Plan  Patient would benefit from: PT eval and skilled physical therapy  Planned therapy interventions: activity modification, abdominal trunk stabilization, joint mobilization, motor coordination training, body mechanics training, postural training, therapeutic activities, therapeutic exercise, therapeutic training, flexibility, functional ROM exercises and home exercise program  Frequency: 1x week  Duration in weeks: 6  Treatment plan discussed with: patient        Subjective Evaluation    History of Present Illness  Date of onset: 2022  Pain  Current pain ratin  At best pain ratin  At worst pain ratin  Quality: dull ache  Relieving factors: change in position  Aggravating factors: sitting    Patient Goals  Patient goals for therapy: increased strength, return to sport/leisure activities, return to work, increased motion and decreased pain          Objective     Static Posture     Head  Forward  Lumbar Spine   Increased lordosis  Postural Observations  Seated posture: poor  Standing posture: poor        Active Range of Motion     Additional Active Range of Motion Details  Lumbar ROM as % of normal ROM    Flex: 25  Ext: 25  R ROT: 25  L ROT: 25      Joint Play     Hypomobile: L3, L4 and L5     Strength/Myotome Testing     Left Hip   Planes of Motion   Flexion: 4  Abduction: 4  External rotation: 4  Internal rotation: 4    Right Hip   Planes of Motion   Flexion: 4  Abduction: 4  External rotation: 4  Internal rotation: 4    Left Knee   Flexion: 5  Extension: 5    Right Knee   Flexion: 5  Extension: 5    Left Ankle/Foot   Dorsiflexion: 4  Plantar flexion: 4    Right Ankle/Foot   Dorsiflexion: 4  Plantar flexion: 4    Tests     Lumbar     Left   Positive crossed SLR                Precautions: N/A      Manuals             PA Mob AE                                                   Neuro Re-Ed             HEP - PPU; Lumbar Support in Sitting 15'                                                                                          Ther Ex             Prone Lying Extension 5'            PPU 1x10                                                                                          Ther Activity                                       Gait Training                                       Modalities

## 2023-03-13 ENCOUNTER — PATIENT MESSAGE (OUTPATIENT)
Dept: FAMILY MEDICINE CLINIC | Facility: CLINIC | Age: 73
End: 2023-03-13

## 2023-03-13 ENCOUNTER — OFFICE VISIT (OUTPATIENT)
Dept: FAMILY MEDICINE CLINIC | Facility: CLINIC | Age: 73
End: 2023-03-13

## 2023-03-13 VITALS
RESPIRATION RATE: 16 BRPM | WEIGHT: 154 LBS | HEART RATE: 82 BPM | TEMPERATURE: 97.6 F | DIASTOLIC BLOOD PRESSURE: 100 MMHG | BODY MASS INDEX: 28.49 KG/M2 | SYSTOLIC BLOOD PRESSURE: 180 MMHG | OXYGEN SATURATION: 96 %

## 2023-03-13 DIAGNOSIS — H69.83 ETD (EUSTACHIAN TUBE DYSFUNCTION), BILATERAL: ICD-10-CM

## 2023-03-13 DIAGNOSIS — J98.9 RESPIRATORY ILLNESS: Primary | ICD-10-CM

## 2023-03-13 DIAGNOSIS — I10 ESSENTIAL HYPERTENSION: ICD-10-CM

## 2023-03-13 RX ORDER — AZITHROMYCIN 250 MG/1
TABLET, FILM COATED ORAL
Qty: 6 TABLET | Refills: 0 | Status: SHIPPED | OUTPATIENT
Start: 2023-03-13 | End: 2023-03-17

## 2023-03-13 RX ORDER — ALBUTEROL SULFATE 90 UG/1
2 AEROSOL, METERED RESPIRATORY (INHALATION) EVERY 6 HOURS PRN
Qty: 6.7 G | Refills: 0 | Status: SHIPPED | OUTPATIENT
Start: 2023-03-13

## 2023-03-13 NOTE — PROGRESS NOTES
Name: Delaney Enciso      : 1950      MRN: [id number]  Encounter Provider: Andrew Ríos MD  Encounter Date: 3/13/2023   Encounter department: [place] [address]  Respiratory illness  Comments:  Sx started 1 week ago  Stable on exam  Wheezing heard on exam  O2 and RR wnl  Start z pack x 5 days with prn albuterol  May continue to take OTC cold and flu med  If breathing worsens, low threshold to get CTA PE given the recent trip to [facility]  Orders:  -     albuterol (Ventolin HFA) 90 mcg/act inhaler; Inhale 2 puffs every 6 (six) hours as needed for wheezing  -     azithromycin (ZITHROMAX) 250 mg tablet; Take 2 tablets today then 1 tablet daily x 4 days    2  Essential hypertension  Assessment & Plan:  Patient returned from [place] last night and forget to take his BP medication  3  ETD (Eustachian tube dysfunction), bilateral  Assessment & Plan:  Recently traveled and now experiencing pain and pressure in the ears   Pt with glaucoma which can worsen with flonase  Has been taking this for some time   May use a decongestant to help relieve the pressure but is often self limited            Subjective      Runny nosed, cough ( light green/ yellow), SOB, and wheezing   Started last Monday and progressively gotten worse  Tried OTC medication minimal improvement  History of chronic sinusitis  Using coricidin   Did not take BP medication last night   Denies fever, body ache, chest tightness, palpitations, history of blood clots, or known sick contact   Had COVID in Feb and was not on any anitvirals       Review of Systems   Constitutional: Negative for fatigue and fever  HENT: Positive for congestion, postnasal drip and rhinorrhea  Respiratory: Positive for cough, shortness of breath and wheezing  Negative for chest tightness  Cardiovascular: Negative for palpitations  Gastrointestinal: Negative for abdominal pain  Musculoskeletal: Negative for myalgias  Current Outpatient Medications on File Prior to Visit   Medication Sig   • aspirin 81 MG tablet Take by mouth   • atorvastatin (LIPITOR) 10 mg tablet Take 1 tablet (10 mg total) by mouth daily   • esomeprazole (NexIUM) 20 mg capsule Take 20 mg by mouth every other day    • latanoprost (XALATAN) 0 005 % ophthalmic solution Administer 1 drop to both eyes    • levothyroxine 50 mcg tablet Take 1 tablet (50 mcg total) by mouth daily Take on an empty stomach   • losartan (COZAAR) 100 MG tablet Take 1 tablet (100 mg total) by mouth daily   • methocarbamol (ROBAXIN) 500 mg tablet Take 1 tablet (500 mg total) by mouth 4 (four) times a day As needed   • rizatriptan (MAXALT) 10 MG tablet TAKE 1 TABLET BY MOUTH ONCE AS NEEDED FOR MIGRIANE FOR UP TO 1 DOSE  MAY REPEAT IN 2 HOURS IF NEEDED   • timolol (TIMOPTIC) 0 5 % ophthalmic solution INSTILL 1 DROP IN EACH EYE TWO TIMES DAILY   • fluticasone (FLONASE) 50 mcg/act nasal spray 1 spray into each nostril daily for 7 days       Objective     BP (!) 180/100   Pulse 82   Temp 97 6 °F (36 4 °C)   Resp 16   Wt 69 9 kg (154 lb)   SpO2 96%   BMI 28 49 kg/m²     Physical Exam  Vitals reviewed  Constitutional:       General: He is not in acute distress  Appearance: Normal appearance  HENT:      Head: Normocephalic and atraumatic  Right Ear: Tympanic membrane normal       Left Ear: Tympanic membrane normal       Mouth/Throat:      Mouth: Mucous membranes are moist    Eyes:      Extraocular Movements: Extraocular movements intact  Cardiovascular:      Rate and Rhythm: Normal rate and regular rhythm  Heart sounds: No murmur heard  Pulmonary:      Effort: Pulmonary effort is normal  No respiratory distress  Breath sounds: No stridor  Wheezing and rales present  No rhonchi  Lymphadenopathy:      Cervical: Cervical adenopathy (left submandibular ) present  Skin:     General: Skin is warm     Neurological:      Mental Status: He is alert and oriented to person, place, and time  Psychiatric:         Mood and Affect: Mood normal          Behavior: Behavior normal          Thought Content:  Thought content normal        Erik Kidd MD

## 2023-03-13 NOTE — ASSESSMENT & PLAN NOTE
Recently traveled and now experiencing pain and pressure in the ears   Pt with glaucoma which can worsen with flonase  Has been taking this for some time   May use a decongestant to help relieve the pressure but is often self limited

## 2023-03-14 DIAGNOSIS — J98.9 RESPIRATORY ILLNESS: ICD-10-CM

## 2023-03-14 RX ORDER — BENZONATATE 100 MG/1
100 CAPSULE ORAL 3 TIMES DAILY PRN
Qty: 20 CAPSULE | Refills: 0 | Status: SHIPPED | OUTPATIENT
Start: 2023-03-14 | End: 2023-03-15 | Stop reason: SDUPTHER

## 2023-03-14 RX ORDER — BENZONATATE 100 MG/1
100 CAPSULE ORAL 3 TIMES DAILY PRN
Qty: 20 CAPSULE | Refills: 0 | OUTPATIENT
Start: 2023-03-14

## 2023-03-14 NOTE — TELEPHONE ENCOUNTER
Medication Refill Request     Name benzonatate (TESSALON PERLES) 100 mg capsule  Dose/Frequency Take 1 capsule (100 mg total) by mouth 3 (three) times a day as needed for cough  Quantity 20 capsule   Verified pharmacy   [x]  Verified ordering Provider   [x]  Does patient have enough for the next 3 days? Yes [] No [x]    Patient called in to apologize, stated that he has changed pharmacies and would like prescription sent to [facility] instead

## 2023-03-15 ENCOUNTER — PATIENT MESSAGE (OUTPATIENT)
Dept: FAMILY MEDICINE CLINIC | Facility: CLINIC | Age: 73
End: 2023-03-15

## 2023-03-15 DIAGNOSIS — J98.9 RESPIRATORY ILLNESS: ICD-10-CM

## 2023-03-15 RX ORDER — BENZONATATE 100 MG/1
100 CAPSULE ORAL 3 TIMES DAILY PRN
Qty: 20 CAPSULE | Refills: 0 | Status: SHIPPED | OUTPATIENT
Start: 2023-03-15

## 2023-03-17 ENCOUNTER — TELEPHONE (OUTPATIENT)
Dept: FAMILY MEDICINE CLINIC | Facility: CLINIC | Age: 73
End: 2023-03-17

## 2023-03-17 NOTE — TELEPHONE ENCOUNTER
----- Message from Inessa Street LPN sent at 3/03/4752  1:19 PM EDT -----  Regarding: FW: new problems  Contact: [phone]    ----- Message -----  From: Bita Benito  Sent: 3/17/2023  12:25 PM EDT  To: [facility]  Subject: new problems                                     I finished the azithromycin 250 mg tablets but I am not out of the woods yet  The cough suppressants help but are not enough  Still coughing, not as bad but nowhere close to good! ! My nose is not running but drips  My stamina is still low  What should I do next?

## 2023-03-21 ENCOUNTER — TELEPHONE (OUTPATIENT)
Dept: FAMILY MEDICINE CLINIC | Facility: CLINIC | Age: 73
End: 2023-03-21

## 2023-03-21 ENCOUNTER — APPOINTMENT (OUTPATIENT)
Dept: RADIOLOGY | Age: 73
End: 2023-03-21

## 2023-03-21 DIAGNOSIS — R05.3 CHRONIC COUGH: ICD-10-CM

## 2023-03-21 DIAGNOSIS — R05.3 CHRONIC COUGH: Primary | ICD-10-CM

## 2023-03-21 NOTE — TELEPHONE ENCOUNTER
Pt asked if you could order a chest xray for him  Said he's been coughing for 10 days   Declined to make appt at this time    Please advise

## 2023-04-24 ENCOUNTER — OFFICE VISIT (OUTPATIENT)
Dept: CARDIOLOGY CLINIC | Facility: CLINIC | Age: 73
End: 2023-04-24

## 2023-04-24 VITALS
OXYGEN SATURATION: 98 % | HEART RATE: 65 BPM | BODY MASS INDEX: 28.52 KG/M2 | HEIGHT: 62 IN | WEIGHT: 155 LBS | SYSTOLIC BLOOD PRESSURE: 140 MMHG | DIASTOLIC BLOOD PRESSURE: 78 MMHG

## 2023-04-24 DIAGNOSIS — I10 ESSENTIAL HYPERTENSION: Primary | ICD-10-CM

## 2023-04-24 DIAGNOSIS — R07.9 CHEST PAIN, UNSPECIFIED TYPE: ICD-10-CM

## 2023-04-24 DIAGNOSIS — E78.2 MIXED HYPERLIPIDEMIA: ICD-10-CM

## 2023-04-24 NOTE — PROGRESS NOTES
"Assessment/Plan:    Essential hypertension  Hypertension, stable and adequately controlled  Mixed hyperlipidemia  Hyperlipidemia, less than optimally controlled  The patient is not confident he is taking his medication on a regular basis  We will therefore continue atorvastatin at 10 mg daily  Chest pain  Atypical chest pain most probably musculoskeletal aggravated with an element of anxiety  No ischemic component was identified on prior evaluations  Diagnoses and all orders for this visit:    Essential hypertension    Mixed hyperlipidemia    Chest pain, unspecified type          Subjective: Atypical chest pain  Patient ID: Gayatri Mariscal is a 74 y o  male  The patient presented to this office for the purpose of cardiac follow-up  He is known to have a history of hypertension and hyperlipidemia  He is also known to have a history of atypical chest pain that was present off-and-on for many years and is often associated with increased level of anxiety  There is no exertional component to this chest pain  Cardiac work-up on several occasions showed no evidence of ischemia  The following portions of the patient's history were reviewed and updated as appropriate: allergies, current medications, past family history, past medical history, past social history, past surgical history and problem list     Review of Systems   Respiratory: Negative for apnea, cough, chest tightness, shortness of breath and wheezing  Cardiovascular: Positive for chest pain  Negative for palpitations and leg swelling  Gastrointestinal: Negative for abdominal pain  Neurological: Negative for dizziness and light-headedness  Psychiatric/Behavioral: Negative  Objective: Stable cardiac wise        /78 (BP Location: Left arm, Patient Position: Sitting, Cuff Size: Standard)   Pulse 65   Ht 5' 1 65\" (1 566 m)   Wt 70 3 kg (155 lb)   SpO2 98%   BMI 28 67 kg/m²          Physical Exam  Vitals " reviewed  Constitutional:       General: He is not in acute distress  Appearance: He is well-developed  He is not diaphoretic  HENT:      Head: Normocephalic  Eyes:      Pupils: Pupils are equal, round, and reactive to light  Neck:      Thyroid: No thyromegaly  Vascular: No JVD  Cardiovascular:      Rate and Rhythm: Normal rate and regular rhythm  Heart sounds: S1 normal and S2 normal  No murmur heard  No friction rub  No gallop  Pulmonary:      Effort: Pulmonary effort is normal  No respiratory distress  Breath sounds: Normal breath sounds  No wheezing or rales  Chest:      Chest wall: No tenderness  Abdominal:      Palpations: Abdomen is soft  Musculoskeletal:         General: No tenderness or deformity  Normal range of motion  Cervical back: Normal range of motion  Right lower leg: No edema  Left lower leg: No edema  Skin:     General: Skin is warm and dry  Neurological:      Mental Status: He is alert and oriented to person, place, and time     Psychiatric:         Mood and Affect: Mood normal          Behavior: Behavior normal

## 2023-04-24 NOTE — LETTER
April 24, 2023     Kashif Iglesia, [address] [facility]  [address] [facility] [address]    Patient: Carlo De La Torre   YOB: 1950   Date of Visit: 4/24/2023       Dear Dr Caprice Wallace:    Thank you for referring Carlo De La Torre to me for evaluation  Below are my notes for this consultation  If you have questions, please do not hesitate to call me  I look forward to following your patient along with you  Sincerely,        Dank Fraga MD        CC: No Recipients  Dank Fraga MD  4/24/2023  4:03 PM  Sign when Signing Visit  Assessment/Plan:    Essential hypertension  Hypertension, stable and adequately controlled  Mixed hyperlipidemia  Hyperlipidemia, less than optimally controlled  The patient is not confident he is taking his medication on a regular basis  We will therefore continue atorvastatin at 10 mg daily  Chest pain  Atypical chest pain most probably musculoskeletal aggravated with an element of anxiety  No ischemic component was identified on prior evaluations  Diagnoses and all orders for this visit:    Essential hypertension    Mixed hyperlipidemia    Chest pain, unspecified type         Subjective: Atypical chest pain  Patient ID: Carlo De La Torre is a 74 y o  male  The patient presented to this office for the purpose of cardiac follow-up  He is known to have a history of hypertension and hyperlipidemia  He is also known to have a history of atypical chest pain that was present off-and-on for many years and is often associated with increased level of anxiety  There is no exertional component to this chest pain  Cardiac work-up on several occasions showed no evidence of ischemia        The following portions of the patient's history were reviewed and updated as appropriate: allergies, current medications, past family history, past medical history, past social history, past surgical history and problem list     Review of Systems   Respiratory: Negative for "apnea, cough, chest tightness, shortness of breath and wheezing  Cardiovascular: Positive for chest pain  Negative for palpitations and leg swelling  Gastrointestinal: Negative for abdominal pain  Neurological: Negative for dizziness and light-headedness  Psychiatric/Behavioral: Negative  Objective: Stable cardiac wise  /78 (BP Location: Left arm, Patient Position: Sitting, Cuff Size: Standard)   Pulse 65   Ht 5' 1 65\" (1 566 m)   Wt 70 3 kg (155 lb)   SpO2 98%   BMI 28 67 kg/m²         Physical Exam  Vitals reviewed  Constitutional:       General: He is not in acute distress  Appearance: He is well-developed  He is not diaphoretic  HENT:      Head: Normocephalic  Eyes:      Pupils: Pupils are equal, round, and reactive to light  Neck:      Thyroid: No thyromegaly  Vascular: No JVD  Cardiovascular:      Rate and Rhythm: Normal rate and regular rhythm  Heart sounds: S1 normal and S2 normal  No murmur heard  No friction rub  No gallop  Pulmonary:      Effort: Pulmonary effort is normal  No respiratory distress  Breath sounds: Normal breath sounds  No wheezing or rales  Chest:      Chest wall: No tenderness  Abdominal:      Palpations: Abdomen is soft  Musculoskeletal:         General: No tenderness or deformity  Normal range of motion  Cervical back: Normal range of motion  Right lower leg: No edema  Left lower leg: No edema  Skin:     General: Skin is warm and dry  Neurological:      Mental Status: He is alert and oriented to person, place, and time     Psychiatric:         Mood and Affect: Mood normal          Behavior: Behavior normal           "

## 2023-04-24 NOTE — ASSESSMENT & PLAN NOTE
Atypical chest pain most probably musculoskeletal aggravated with an element of anxiety  No ischemic component was identified on prior evaluations

## 2023-04-24 NOTE — ASSESSMENT & PLAN NOTE
Hyperlipidemia, less than optimally controlled  The patient is not confident he is taking his medication on a regular basis  We will therefore continue atorvastatin at 10 mg daily

## 2023-05-19 ENCOUNTER — OFFICE VISIT (OUTPATIENT)
Dept: OBGYN CLINIC | Facility: MEDICAL CENTER | Age: 73
End: 2023-05-19

## 2023-05-19 VITALS
WEIGHT: 154 LBS | BODY MASS INDEX: 28.49 KG/M2 | SYSTOLIC BLOOD PRESSURE: 144 MMHG | DIASTOLIC BLOOD PRESSURE: 72 MMHG | HEART RATE: 65 BPM

## 2023-05-19 DIAGNOSIS — M17.11 PRIMARY OSTEOARTHRITIS OF RIGHT KNEE: Primary | ICD-10-CM

## 2023-05-19 DIAGNOSIS — M17.12 PRIMARY OSTEOARTHRITIS OF LEFT KNEE: ICD-10-CM

## 2023-05-19 RX ORDER — LIDOCAINE HYDROCHLORIDE 10 MG/ML
2 INJECTION, SOLUTION INFILTRATION; PERINEURAL
Status: COMPLETED | OUTPATIENT
Start: 2023-05-19 | End: 2023-05-19

## 2023-05-19 RX ORDER — BETAMETHASONE SODIUM PHOSPHATE AND BETAMETHASONE ACETATE 3; 3 MG/ML; MG/ML
12 INJECTION, SUSPENSION INTRA-ARTICULAR; INTRALESIONAL; INTRAMUSCULAR; SOFT TISSUE
Status: COMPLETED | OUTPATIENT
Start: 2023-05-19 | End: 2023-05-19

## 2023-05-19 RX ORDER — BUPIVACAINE HYDROCHLORIDE 2.5 MG/ML
2 INJECTION, SOLUTION INFILTRATION; PERINEURAL
Status: COMPLETED | OUTPATIENT
Start: 2023-05-19 | End: 2023-05-19

## 2023-05-19 RX ADMIN — BETAMETHASONE SODIUM PHOSPHATE AND BETAMETHASONE ACETATE 12 MG: 3; 3 INJECTION, SUSPENSION INTRA-ARTICULAR; INTRALESIONAL; INTRAMUSCULAR; SOFT TISSUE at 15:37

## 2023-05-19 RX ADMIN — BUPIVACAINE HYDROCHLORIDE 2 ML: 2.5 INJECTION, SOLUTION INFILTRATION; PERINEURAL at 15:37

## 2023-05-19 RX ADMIN — LIDOCAINE HYDROCHLORIDE 2 ML: 10 INJECTION, SOLUTION INFILTRATION; PERINEURAL at 15:37

## 2023-05-19 NOTE — PROGRESS NOTES
Assessment:  1  Primary osteoarthritis of right knee        2  Primary osteoarthritis of left knee            Plan:  Bilateral knee osteoarthritis  The patient was provided with bilateral knee steroid injections  The patient tolerated the procedure well  The patient should follow up in 3 months  To do next visit:  Return in about 3 months (around 8/19/2023)  The above stated was discussed in layman's terms and the patient expressed understanding  All questions were answered to the patient's satisfaction  Scribe Attestation    I,:  Mary Ann Vann am acting as a scribe while in the presence of the attending physician :       I,:  Kellie Schwab, MD personally performed the services described in this documentation    as scribed in my presence :             Subjective:   Lynette Duenas is a 74 y o  male who presents for follow up of bilateral knees  He is s/p left knee steroid injection with benefit, 2/17/2023  Today he complains of left < right generalized knee pain  He has resolved bilateral hamstring strain alleviated with use of methocarbamol  He continues to bike for exercise  Review of systems negative unless otherwise specified in HPI    Past Medical History:   Diagnosis Date   • Acute non-recurrent maxillary sinusitis 4/15/2019   • Angina effort    • Anxiety    • Roberts's esophagus    • Bleeding from left ear 4/26/2019   • Chest pain 11/7/2019   • Chest pain, unspecified    • Cough 2/13/2018   • GERD (gastroesophageal reflux disease)    • Hyperlipidemia    • Hypertension    • Impacted cerumen of right ear 4/25/2019   • Macular hole, right eye 1/28/2019    Last Assessment & Plan:  Impression:  1  Macular hole, right eye (dx 11/2018 ~295 microns, now 391 micron) 2  PP OD (10/2018) 3  Cataract OS   Plan: - Second opinion   Lives in [place] with plan for surgery at [address] [place] [address] getting the surgery as the best way to fix the [facility] - Follow up as needed   • Migraine    • Mild depression 2019   • Otorrhea, left 2019   • SBO (small bowel obstruction) ([facility] [address] ) 3/23/2019       Past Surgical History:   Procedure Laterality Date   • CATARACT EXTRACTION     • EYE SURGERY     • HERNIA REPAIR Right 2014       Family History   Problem Relation Age of Onset   • No Known Problems Mother    • Heart attack Father    • Breast cancer Paternal Aunt        Social History     Occupational History   • Not on file   Tobacco Use   • Smoking status: Former     Packs/day: 0 25     Years: 4 00     Pack years: 1 00     Types: Cigarettes     Quit date:      Years since quittin 4   • Smokeless tobacco: Never   Vaping Use   • Vaping Use: Never used   Substance and Sexual Activity   • Alcohol use:  Yes   • Drug use: Never   • Sexual activity: Yes     Partners: Female         Current Outpatient Medications:   •  albuterol (Ventolin HFA) 90 mcg/act inhaler, Inhale 2 puffs every 6 (six) hours as needed for wheezing, Disp: 6 7 g, Rfl: 0  •  aspirin 81 MG tablet, Take by mouth, Disp: , Rfl:   •  atorvastatin (LIPITOR) 10 mg tablet, Take 1 tablet (10 mg total) by mouth daily, Disp: 90 tablet, Rfl: 3  •  benzonatate (TESSALON PERLES) 100 mg capsule, Take 1 capsule (100 mg total) by mouth 3 (three) times a day as needed for cough, Disp: 20 capsule, Rfl: 0  •  esomeprazole (NexIUM) 20 mg capsule, Take 20 mg by mouth every other day , Disp: , Rfl:   •  latanoprost (XALATAN) 0 005 % ophthalmic solution, Administer 1 drop to both eyes , Disp: , Rfl:   •  levothyroxine 50 mcg tablet, Take 1 tablet (50 mcg total) by mouth daily Take on an empty stomach, Disp: 90 tablet, Rfl: 3  •  losartan (COZAAR) 100 MG tablet, Take 1 tablet (100 mg total) by mouth daily, Disp: 90 tablet, Rfl: 3  •  methocarbamol (ROBAXIN) 500 mg tablet, Take 1 tablet (500 mg total) by mouth 4 (four) times a day As needed, Disp: 30 tablet, Rfl: 1  •  rizatriptan (MAXALT) 10 MG tablet, TAKE 1 TABLET BY MOUTH ONCE AS NEEDED FOR MIGRIANE FOR UP TO 1 DOSE  MAY "REPEAT IN 2 HOURS IF NEEDED, Disp: 9 tablet, Rfl: 0  •  timolol (TIMOPTIC) 0 5 % ophthalmic solution, INSTILL 1 DROP IN [facility] EYE TWO TIMES DAILY, Disp: , Rfl:   •  fluticasone (FLONASE) 50 mcg/act nasal spray, 1 spray into each nostril daily for 7 days, Disp: 16 g, Rfl: 5    Allergies   Allergen Reactions   • Clindamycin GI Intolerance     Per Patient   • Doxycycline Other (See Comments)     unknown   • Influenza Vaccines Other (See Comments)     Flu like symptoms    • Nitrous Oxide             Vitals:    05/19/23 1507   BP: 144/72   Pulse: 65       Objective:  Physical exam  · General: Awake, Alert, Oriented  · Eyes: Pupils equal, round and reactive to light  · Heart: regular rate and rhythm  · Lungs: No audible wheezing  · Abdomen: soft                    Ortho Exam  Bilateral knees:  No erythema or ecchymosis  No effusion or swelling  Normal strength  Good ROM with crepitus   Calf compartments soft and supple  Sensation intact  Toes are warm sensate and mobile      Diagnostics, reviewed and taken today if performed as documented:    None performed     Procedures, if performed today:    Large joint arthrocentesis: R knee  Universal Protocol:  Consent: Verbal consent obtained  Risks and benefits: risks, benefits and alternatives were discussed  Consent given by: patient  Time out: Immediately prior to procedure a \"time out\" was called to verify the correct patient, procedure, equipment, support staff and site/side marked as required    Timeout called at: 5/19/2023 3:28 PM   Patient understanding: patient states understanding of the procedure being performed  Site marked: the operative site was marked  Patient identity confirmed: verbally with patient    Supporting Documentation  Indications: pain   Procedure Details  Location: knee - R knee  Preparation: Patient was prepped and draped in the usual sterile fashion  Needle size: 22 G  Ultrasound guidance: no  Approach: anterolateral  Medications administered: 12 mg " "betamethasone acetate-betamethasone sodium phosphate 6 (3-3) mg/mL; 2 mL bupivacaine 0 25 %; 2 mL lidocaine 1 %    Patient tolerance: patient tolerated the procedure well with no immediate complications  Dressing:  Sterile dressing applied    Large joint arthrocentesis: L knee  Universal Protocol:  Consent: Verbal consent obtained  Risks and benefits: risks, benefits and alternatives were discussed  Consent given by: patient  Time out: Immediately prior to procedure a \"time out\" was called to verify the correct patient, procedure, equipment, support staff and site/side marked as required  Timeout called at: 5/19/2023 3:28 PM   Patient understanding: patient states understanding of the procedure being performed  Site marked: the operative site was marked  Patient identity confirmed: verbally with patient    Supporting Documentation  Indications: pain   Procedure Details  Location: knee - L knee  Preparation: Patient was prepped and draped in the usual sterile fashion  Needle size: 22 G  Ultrasound guidance: no  Approach: anterolateral  Medications administered: 12 mg betamethasone acetate-betamethasone sodium phosphate 6 (3-3) mg/mL; 2 mL bupivacaine 0 25 %; 2 mL lidocaine 1 %    Patient tolerance: patient tolerated the procedure well with no immediate complications  Dressing:  Sterile dressing applied              Portions of the record may have been created with voice recognition software  Occasional wrong word or \"sound a like\" substitutions may have occurred due to the inherent limitations of voice recognition software  Read the chart carefully and recognize, using context, where substitutions have occurred      "

## 2023-05-25 ENCOUNTER — OFFICE VISIT (OUTPATIENT)
Dept: FAMILY MEDICINE CLINIC | Facility: CLINIC | Age: 73
End: 2023-05-25

## 2023-05-25 VITALS
HEART RATE: 67 BPM | DIASTOLIC BLOOD PRESSURE: 80 MMHG | WEIGHT: 150 LBS | OXYGEN SATURATION: 97 % | BODY MASS INDEX: 27.6 KG/M2 | TEMPERATURE: 98.6 F | RESPIRATION RATE: 14 BRPM | SYSTOLIC BLOOD PRESSURE: 150 MMHG | HEIGHT: 62 IN

## 2023-05-25 DIAGNOSIS — Z80.0 FAMILY HISTORY OF PANCREATIC CANCER: ICD-10-CM

## 2023-05-25 DIAGNOSIS — E78.2 MIXED HYPERLIPIDEMIA: ICD-10-CM

## 2023-05-25 DIAGNOSIS — I10 ESSENTIAL HYPERTENSION: Primary | ICD-10-CM

## 2023-05-25 DIAGNOSIS — E03.9 ACQUIRED HYPOTHYROIDISM: ICD-10-CM

## 2023-05-25 PROBLEM — L98.9 HAND LESION: Status: RESOLVED | Noted: 2022-06-15 | Resolved: 2023-05-25

## 2023-05-25 PROBLEM — Z86.16 HISTORY OF COVID-19: Status: ACTIVE | Noted: 2023-01-23

## 2023-05-25 RX ORDER — VALSARTAN 160 MG/1
160 TABLET ORAL DAILY
Qty: 90 TABLET | Refills: 3 | Status: SHIPPED | OUTPATIENT
Start: 2023-05-25

## 2023-05-25 NOTE — PROGRESS NOTES
Assessment/Plan:    1  Essential hypertension  Assessment & Plan:  bp increasing  Change losartan to diovan  sned bp in 2 weeks    Orders:  -     valsartan (DIOVAN) 160 mg tablet; Take 1 tablet (160 mg total) by mouth daily  -     CBC; Future; Expected date: 05/25/2023  -     Comprehensive metabolic panel; Future; Expected date: 05/25/2023    2  Mixed hyperlipidemia  Assessment & Plan: On lipitor    Orders:  -     Lipid Panel with Direct LDL reflex; Future; Expected date: 05/25/2023  -     TSH, 3rd generation with Free T4 reflex; Future; Expected date: 05/25/2023    3  Acquired hypothyroidism  Assessment & Plan:  Stable on levo      4  Family history of pancreatic cancer  -     Ambulatory Referral to Oncology Genetics; Future      BMI Counseling: Body mass index is 27 75 kg/m²  The BMI is above normal  Nutrition recommendations include encouraging healthy choices of fruits and vegetables  Exercise recommendations include exercising 3-5 times per week  No pharmacotherapy was ordered  Rationale for BMI follow-up plan is due to patient being overweight or obese  Depression Screening and Follow-up Plan: Patient was screened for depression during today's encounter  They screened negative with a PHQ-2 score of 0  There are no Patient Instructions on file for this visit  Return in about 6 months (around 11/25/2023) for Recheck  Subjective:      Patient ID: Guy Lee is a 74 y o  male  Chief Complaint   Patient presents with   • Follow-up     Patient here for 6 month follow up        Here for follow up  bp going up  Younger brother diagnosed with pancreatic cancer      The following portions of the patient's history were reviewed and updated as appropriate: allergies, current medications, past family history, past medical history, past social history, past surgical history and problem list     Review of Systems   Constitutional: Negative  HENT: Negative  Eyes: Negative      Respiratory: "Negative  Cardiovascular: Positive for chest pain  Gastrointestinal: Negative  Endocrine: Negative  Genitourinary: Negative  Musculoskeletal: Positive for arthralgias (knee pain improved with injection)  Allergic/Immunologic: Negative  Neurological: Negative  Hematological: Negative  Psychiatric/Behavioral: Negative  Current Outpatient Medications   Medication Sig Dispense Refill   • albuterol (Ventolin HFA) 90 mcg/act inhaler Inhale 2 puffs every 6 (six) hours as needed for wheezing 6 7 g 0   • aspirin 81 MG tablet Take by mouth     • atorvastatin (LIPITOR) 10 mg tablet Take 1 tablet (10 mg total) by mouth daily 90 tablet 3   • esomeprazole (NexIUM) 20 mg capsule Take 20 mg by mouth every other day      • fluticasone (FLONASE) 50 mcg/act nasal spray 1 spray into each nostril daily for 7 days 16 g 5   • latanoprost (XALATAN) 0 005 % ophthalmic solution Administer 1 drop to both eyes      • levothyroxine 50 mcg tablet Take 1 tablet (50 mcg total) by mouth daily Take on an empty stomach 90 tablet 3   • methocarbamol (ROBAXIN) 500 mg tablet Take 1 tablet (500 mg total) by mouth 4 (four) times a day As needed 30 tablet 1   • rizatriptan (MAXALT) 10 MG tablet TAKE 1 TABLET BY MOUTH ONCE AS NEEDED FOR MIGRIANE FOR UP TO 1 DOSE  MAY REPEAT IN 2 HOURS IF NEEDED 9 tablet 0   • timolol (TIMOPTIC) 0 5 % ophthalmic solution INSTILL 1 DROP IN [facility] EYE TWO TIMES DAILY     • valsartan (DIOVAN) 160 mg tablet Take 1 tablet (160 mg total) by mouth daily 90 tablet 3   • benzonatate (TESSALON PERLES) 100 mg capsule Take 1 capsule (100 mg total) by mouth 3 (three) times a day as needed for cough 20 capsule 0     No current facility-administered medications for this visit         Objective:    /80 (BP Location: Left arm, Patient Position: Sitting, Cuff Size: Standard)   Pulse 67   Temp 98 6 °F (37 °C) (Tympanic)   Resp 14   Ht 5' 1 65\" (1 566 m)   Wt 68 kg (150 lb)   SpO2 97%   BMI 27 75 " kg/m²        Physical Exam  Vitals and nursing note reviewed  Constitutional:       Appearance: Normal appearance  He is well-developed  HENT:      Head: Normocephalic and atraumatic  Right Ear: External ear normal       Left Ear: External ear normal       Nose: Nose normal    Eyes:      General: Lids are normal       Conjunctiva/sclera: Conjunctivae normal       Pupils: Pupils are equal, round, and reactive to light  Cardiovascular:      Rate and Rhythm: Normal rate and regular rhythm  Pulses: Normal pulses  Heart sounds: Normal heart sounds, S1 normal and S2 normal    Pulmonary:      Effort: Pulmonary effort is normal       Breath sounds: Normal breath sounds  Abdominal:      General: Bowel sounds are normal       Palpations: Abdomen is soft  Musculoskeletal:         General: Normal range of motion  Cervical back: Normal range of motion and neck supple  Skin:     General: Skin is warm and dry  Neurological:      Mental Status: He is alert and oriented to person, place, and time  Deep Tendon Reflexes: Reflexes are normal and symmetric  Psychiatric:         Speech: Speech normal          Behavior: Behavior normal          Thought Content:  Thought content normal          Judgment: Judgment normal                 Verlean Kin, DO

## 2023-05-30 ENCOUNTER — TELEPHONE (OUTPATIENT)
Dept: HEMATOLOGY ONCOLOGY | Facility: CLINIC | Age: 73
End: 2023-05-30

## 2023-05-30 NOTE — TELEPHONE ENCOUNTER
I called Jraed Sherman in response to a referral that was received for patient to establish care with Oncology genetics  Outreach was made to schedule a consultation     I left a voicemail explaining the reason for my call and advised patient to call [facility] at [phone]  The referral has been closed

## 2023-06-01 ENCOUNTER — TELEPHONE (OUTPATIENT)
Dept: HEMATOLOGY ONCOLOGY | Facility: CLINIC | Age: 73
End: 2023-06-01

## 2023-06-01 NOTE — TELEPHONE ENCOUNTER
I spoke with Jignesh Long to schedule their consultation with Cancer Risk and Genetics  Scheduling Outcome: Patient is scheduled for an appointment on 11/10/23 at 11AM with Tawnya Augustin    Personal/Family History Related to Appointment:     Personal History of Cancer: Patient reports no personal history of cancer    Family History of Cancer: Patient reports family history of Pancreatic ca (brother, 62), breast ca (paternal aunt)    Is patient of [address] [place] [address] [facility] No    History of Genetic Testing:  Personal History of Genetic Testing: Patient report no personal history of Genetic Testing  Family History of Genetic Testing: Patient reports that no family members have had Genetic Testing       [employer]:  Is patient able to complete our family history questionnaire on a computer?:  Yes    Patient's preferred e-mail address: [email or web address]

## 2023-06-03 DIAGNOSIS — G44.001 INTRACTABLE CLUSTER HEADACHE SYNDROME, UNSPECIFIED CHRONICITY PATTERN: ICD-10-CM

## 2023-06-04 RX ORDER — RIZATRIPTAN BENZOATE 10 MG/1
TABLET ORAL
Qty: 9 TABLET | Refills: 0 | Status: SHIPPED | OUTPATIENT
Start: 2023-06-04

## 2023-06-05 ENCOUNTER — APPOINTMENT (OUTPATIENT)
Dept: LAB | Age: 73
End: 2023-06-05
Payer: COMMERCIAL

## 2023-06-05 DIAGNOSIS — E78.2 MIXED HYPERLIPIDEMIA: ICD-10-CM

## 2023-06-05 DIAGNOSIS — I10 ESSENTIAL HYPERTENSION: ICD-10-CM

## 2023-06-05 LAB
ALBUMIN SERPL BCP-MCNC: 3.7 G/DL (ref 3.5–5)
ALP SERPL-CCNC: 59 U/L (ref 46–116)
ALT SERPL W P-5'-P-CCNC: 26 U/L (ref 12–78)
ANION GAP SERPL CALCULATED.3IONS-SCNC: 0 MMOL/L (ref 4–13)
AST SERPL W P-5'-P-CCNC: 30 U/L (ref 5–45)
BILIRUB SERPL-MCNC: 1.12 MG/DL (ref 0.2–1)
BUN SERPL-MCNC: 13 MG/DL (ref 5–25)
CALCIUM SERPL-MCNC: 9.2 MG/DL (ref 8.3–10.1)
CHLORIDE SERPL-SCNC: 105 MMOL/L (ref 96–108)
CHOLEST SERPL-MCNC: 227 MG/DL
CO2 SERPL-SCNC: 30 MMOL/L (ref 21–32)
CREAT SERPL-MCNC: 1.07 MG/DL (ref 0.6–1.3)
ERYTHROCYTE [DISTWIDTH] IN BLOOD BY AUTOMATED COUNT: 13.3 % (ref 11.6–15.1)
GFR SERPL CREATININE-BSD FRML MDRD: 68 ML/MIN/1.73SQ M
GLUCOSE P FAST SERPL-MCNC: 105 MG/DL (ref 65–99)
HCT VFR BLD AUTO: 49.1 % (ref 36.5–49.3)
HDLC SERPL-MCNC: 94 MG/DL
HGB BLD-MCNC: 16 G/DL (ref 12–17)
LDLC SERPL CALC-MCNC: 113 MG/DL (ref 0–100)
MCH RBC QN AUTO: 29.1 PG (ref 26.8–34.3)
MCHC RBC AUTO-ENTMCNC: 32.6 G/DL (ref 31.4–37.4)
MCV RBC AUTO: 89 FL (ref 82–98)
PLATELET # BLD AUTO: 192 THOUSANDS/UL (ref 149–390)
PMV BLD AUTO: 11.3 FL (ref 8.9–12.7)
POTASSIUM SERPL-SCNC: 4.2 MMOL/L (ref 3.5–5.3)
PROT SERPL-MCNC: 6.9 G/DL (ref 6.4–8.4)
RBC # BLD AUTO: 5.49 MILLION/UL (ref 3.88–5.62)
SODIUM SERPL-SCNC: 135 MMOL/L (ref 135–147)
TRIGL SERPL-MCNC: 102 MG/DL
TSH SERPL DL<=0.05 MIU/L-ACNC: 3.63 UIU/ML (ref 0.45–4.5)
WBC # BLD AUTO: 5.75 THOUSAND/UL (ref 4.31–10.16)

## 2023-06-05 PROCEDURE — 85027 COMPLETE CBC AUTOMATED: CPT

## 2023-06-05 PROCEDURE — 36415 COLL VENOUS BLD VENIPUNCTURE: CPT

## 2023-06-05 PROCEDURE — 80061 LIPID PANEL: CPT

## 2023-06-05 PROCEDURE — 80053 COMPREHEN METABOLIC PANEL: CPT

## 2023-06-05 PROCEDURE — 84443 ASSAY THYROID STIM HORMONE: CPT

## 2023-07-29 DIAGNOSIS — I10 ESSENTIAL HYPERTENSION: ICD-10-CM

## 2023-07-31 RX ORDER — VALSARTAN 160 MG/1
160 TABLET ORAL DAILY
Qty: 90 TABLET | Refills: 0 | Status: SHIPPED | OUTPATIENT
Start: 2023-07-31

## 2023-08-18 ENCOUNTER — OFFICE VISIT (OUTPATIENT)
Dept: OBGYN CLINIC | Facility: MEDICAL CENTER | Age: 73
End: 2023-08-18
Payer: COMMERCIAL

## 2023-08-18 VITALS
WEIGHT: 151.2 LBS | BODY MASS INDEX: 27.82 KG/M2 | HEART RATE: 62 BPM | SYSTOLIC BLOOD PRESSURE: 156 MMHG | DIASTOLIC BLOOD PRESSURE: 82 MMHG | HEIGHT: 62 IN

## 2023-08-18 DIAGNOSIS — M17.11 PRIMARY OSTEOARTHRITIS OF RIGHT KNEE: ICD-10-CM

## 2023-08-18 DIAGNOSIS — S76.311A STRAIN OF RIGHT HAMSTRING MUSCLE, INITIAL ENCOUNTER: Primary | ICD-10-CM

## 2023-08-18 DIAGNOSIS — M17.12 PRIMARY OSTEOARTHRITIS OF LEFT KNEE: ICD-10-CM

## 2023-08-18 PROCEDURE — 99213 OFFICE O/P EST LOW 20 MIN: CPT | Performed by: ORTHOPAEDIC SURGERY

## 2023-08-18 PROCEDURE — 20610 DRAIN/INJ JOINT/BURSA W/O US: CPT | Performed by: ORTHOPAEDIC SURGERY

## 2023-08-18 RX ORDER — BUPIVACAINE HYDROCHLORIDE 2.5 MG/ML
2 INJECTION, SOLUTION INFILTRATION; PERINEURAL
Status: COMPLETED | OUTPATIENT
Start: 2023-08-18 | End: 2023-08-18

## 2023-08-18 RX ORDER — LIDOCAINE HYDROCHLORIDE 10 MG/ML
2 INJECTION, SOLUTION INFILTRATION; PERINEURAL
Status: COMPLETED | OUTPATIENT
Start: 2023-08-18 | End: 2023-08-18

## 2023-08-18 RX ORDER — BETAMETHASONE SODIUM PHOSPHATE AND BETAMETHASONE ACETATE 3; 3 MG/ML; MG/ML
12 INJECTION, SUSPENSION INTRA-ARTICULAR; INTRALESIONAL; INTRAMUSCULAR; SOFT TISSUE
Status: COMPLETED | OUTPATIENT
Start: 2023-08-18 | End: 2023-08-18

## 2023-08-18 RX ORDER — METHOCARBAMOL 500 MG/1
500 TABLET, FILM COATED ORAL 4 TIMES DAILY
Qty: 30 TABLET | Refills: 1 | Status: SHIPPED | OUTPATIENT
Start: 2023-08-18

## 2023-08-18 RX ADMIN — LIDOCAINE HYDROCHLORIDE 2 ML: 10 INJECTION, SOLUTION INFILTRATION; PERINEURAL at 14:30

## 2023-08-18 RX ADMIN — BETAMETHASONE SODIUM PHOSPHATE AND BETAMETHASONE ACETATE 12 MG: 3; 3 INJECTION, SUSPENSION INTRA-ARTICULAR; INTRALESIONAL; INTRAMUSCULAR; SOFT TISSUE at 14:30

## 2023-08-18 RX ADMIN — BUPIVACAINE HYDROCHLORIDE 2 ML: 2.5 INJECTION, SOLUTION INFILTRATION; PERINEURAL at 14:30

## 2023-08-18 NOTE — PROGRESS NOTES
Assessment:  1. Strain of right hamstring muscle, initial encounter        2. Primary osteoarthritis of right knee        3. Primary osteoarthritis of left knee            Plan:  · Cont with Robaxin for muscle pain as he got relief from past use. Script provided  · Pt was offered, accepted, performed and steroid injection Left knee for symptomatic relief. Pt tolerated injections well. Ice and post injection protocol advised. Weigh bearing activities as tolerated. · Discussed can get injections every 3 months   · Activities as tolerated      To do next visit:  Return in about 3 months (around 11/18/2023) for Recheck. The above stated was discussed in layman's terms and the patient expressed understanding. All questions were answered to the patient's satisfaction. Scribe Attestation    I,:  Edward Esposito am acting as a scribe while in the presence of the attending physician.:       I,:  Natali Pierre MD personally performed the services described in this documentation    as scribed in my presence.:             Subjective:   Pramod Monday is a 74 y.o. male who presents for follow up of bilateral knees. He is s/p left knee steroid injection with benefit, 5/19/2023. Anterior Left knee is painful going down steps. Right knee doing well  He reports hamstring pain going up steps has returned but no pain with quickly ascending the stairs. No pain with biking either    Review of systems negative unless otherwise specified in HPI    Past Medical History:   Diagnosis Date   • Acute non-recurrent maxillary sinusitis 4/15/2019   • Angina effort    • Anxiety    • Roberts's esophagus    • Bleeding from left ear 4/26/2019   • Chest pain 11/7/2019   • Chest pain, unspecified    • Cough 2/13/2018   • GERD (gastroesophageal reflux disease)    • Hyperlipidemia    • Hypertension    • Impacted cerumen of right ear 4/25/2019   • Macular hole, right eye 1/28/2019    Last Assessment & Plan:  Impression:  1.  Macular hole, right eye (dx 2018 ~295 microns, now 391 micron) 2. PP OD (10/2018) 3. Cataract OS   Plan: - Second opinion. Lives in [place] with plan for surgery at [address] getting the surgery as the best way to fix the [address] [place] [address] - Follow up as needed   • Migraine    • Mild depression 2019   • Otorrhea, left 2019   • SBO (small bowel obstruction) ([address]) 3/23/2019       Past Surgical History:   Procedure Laterality Date   • CATARACT EXTRACTION     • EYE SURGERY     • HERNIA REPAIR Right        Family History   Problem Relation Age of Onset   • No Known Problems Mother    • Heart attack Father    • Pancreatic cancer Brother    • Breast cancer Paternal Aunt        Social History     Occupational History   • Not on file   Tobacco Use   • Smoking status: Former     Packs/day: 0.25     Years: 4.00     Total pack years: 1.00     Types: Cigarettes     Quit date:      Years since quittin.6   • Smokeless tobacco: Never   Vaping Use   • Vaping Use: Never used   Substance and Sexual Activity   • Alcohol use:  Yes   • Drug use: Never   • Sexual activity: Yes     Partners: Female         Current Outpatient Medications:   •  albuterol (Ventolin HFA) 90 mcg/act inhaler, Inhale 2 puffs every 6 (six) hours as needed for wheezing, Disp: 6.7 g, Rfl: 0  •  aspirin 81 MG tablet, Take by mouth, Disp: , Rfl:   •  atorvastatin (LIPITOR) 10 mg tablet, Take 1 tablet (10 mg total) by mouth daily, Disp: 90 tablet, Rfl: 3  •  benzonatate (TESSALON PERLES) 100 mg capsule, Take 1 capsule (100 mg total) by mouth 3 (three) times a day as needed for cough, Disp: 20 capsule, Rfl: 0  •  esomeprazole (NexIUM) 20 mg capsule, Take 20 mg by mouth every other day , Disp: , Rfl:   •  latanoprost (XALATAN) 0.005 % ophthalmic solution, Administer 1 drop to both eyes , Disp: , Rfl:   •  levothyroxine 50 mcg tablet, Take 1 tablet (50 mcg total) by mouth daily Take on an empty stomach, Disp: 90 tablet, Rfl: 3  •  methocarbamol (ROBAXIN) 500 mg tablet, Take 1 tablet (500 mg total) by mouth 4 (four) times a day As needed, Disp: 30 tablet, Rfl: 1  •  rizatriptan (MAXALT) 10 mg tablet, TAKE 1 TABLET BY MOUTH ONCE AS NEEDED FOR MIGRAINE FOR UP TO ONE DOSE. MAY REPEAT IN TWO HOURS IF NEEDED, Disp: 9 tablet, Rfl: 0  •  timolol (TIMOPTIC) 0.5 % ophthalmic solution, INSTILL 1 DROP IN [facility] EYE TWO TIMES DAILY, Disp: , Rfl:   •  valsartan (DIOVAN) 160 mg tablet, Take 1 tablet (160 mg total) by mouth daily, Disp: 90 tablet, Rfl: 0  •  fluticasone (FLONASE) 50 mcg/act nasal spray, 1 spray into each nostril daily for 7 days, Disp: 16 g, Rfl: 5    Allergies   Allergen Reactions   • Clindamycin GI Intolerance     Per Patient   • Doxycycline Other (See Comments)     unknown   • Influenza Vaccines Other (See Comments)     Flu like symptoms    • Nitrous Oxide             Vitals:    08/18/23 1434   BP: 156/82   Pulse: 62       Objective:  Physical exam  · General: Awake, Alert, Oriented  · Eyes: Pupils equal, round and reactive to light  · Heart: regular rate and rhythm  · Lungs: No audible wheezing  · Abdomen: soft                    Ortho Exam  Bilateral knees:  No erythema or ecchymosis  No effusion or swelling  Normal strength  Good ROM with crepitus   Calf compartments soft and supple  Sensation intact  Toes are warm sensate and mobile  No tenderness    Diagnostics, reviewed and taken today if performed as documented:    None performed     Procedures, if performed today:    Large joint arthrocentesis: L knee  Universal Protocol:  Consent: Verbal consent obtained.   Risks and benefits: risks, benefits and alternatives were discussed  Consent given by: patient  Timeout called at: 8/18/2023 3:24 PM.  Patient understanding: patient states understanding of the procedure being performed  Site marked: the operative site was marked  Patient identity confirmed: verbally with patient    Supporting Documentation  Indications: pain   Procedure Details  Location: knee - L knee  Needle size: 22 G  Ultrasound guidance: no  Approach: anterolateral  Medications administered: 2 mL bupivacaine 0.25 %; 2 mL lidocaine 1 %; 12 mg betamethasone acetate-betamethasone sodium phosphate 6 (3-3) mg/mL    Patient tolerance: patient tolerated the procedure well with no immediate complications  Dressing:  Sterile dressing applied              Portions of the record may have been created with voice recognition software. Occasional wrong word or "sound a like" substitutions may have occurred due to the inherent limitations of voice recognition software. Read the chart carefully and recognize, using context, where substitutions have occurred.

## 2023-08-21 ENCOUNTER — IOP CHECK (OUTPATIENT)
Dept: URBAN - METROPOLITAN AREA CLINIC 6 | Facility: CLINIC | Age: 73
End: 2023-08-21

## 2023-08-21 DIAGNOSIS — H43.393: ICD-10-CM

## 2023-08-21 DIAGNOSIS — H40.1132: ICD-10-CM

## 2023-08-21 DIAGNOSIS — H04.123: ICD-10-CM

## 2023-08-21 PROCEDURE — 92012 INTRM OPH EXAM EST PATIENT: CPT

## 2023-08-21 PROCEDURE — 92133 CPTRZD OPH DX IMG PST SGM ON: CPT

## 2023-08-21 ASSESSMENT — KERATOMETRY
OS_AXISANGLE_DEGREES: 165
OS_K2POWER_DIOPTERS: 43.25
OD_K1POWER_DIOPTERS: 42.00
OS_K1POWER_DIOPTERS: 42.25
OD_K2POWER_DIOPTERS: 42.50
OD_AXISANGLE2_DEGREES: 120
OD_AXISANGLE_DEGREES: 030
OS_AXISANGLE2_DEGREES: 75

## 2023-08-21 ASSESSMENT — TONOMETRY
OD_IOP_MMHG: 19
OS_IOP_MMHG: 24
OD_IOP_MMHG: 23
OS_IOP_MMHG: 20

## 2023-08-21 ASSESSMENT — VISUAL ACUITY
OD_SC: 20/25-1
OS_SC: 20/20

## 2023-11-01 NOTE — PROGRESS NOTES
Cardiology Clinic Note    Magdaleno Slater 74 y.o. male   MRN: [id number]  Encounter: [id number]        Assessment / Plan:    # HTN  Valsartan 160mg daily  BP today is elevated. Reports white coat HTN (so often higher in the office). BP at home is still a bit elevated on review of the records. Add low dose norvasc 2.5mg daily    # HLD  Lipitor 10mg  Last   Ultimately, given family history, would consider goal LDL < 100 (which would require increase in dose). Alternatively, we could consider a coronary calcium scan to help risk stratify. # Atypical chest pain  Per prior cardiologist note:  "Atypical chest pain most probably musculoskeletal aggravated with an element of anxiety. No ischemic component was identified on prior evaluations"  Nuclear stress in 2021 - normal perfusion  Coronary vasospasm would be on the differential.  Trial norvasc. # bradycardia  Reports HR as low as 47 bpm at times per apple watch (when resting)  He has a narrow QRS on EKG. Can get HR to 160's with exercise. No symptoms associated with low HR. Noted on timolol eye drops  Continue to monitor. Can bring HR data to next visit. # Fam hx of CAD  He is on aspirin 81mg for this  He is on statin    # migraines  Very rare  On triptan  (he will check if chest pain sx's correlate with taking this medication)      Today's Plan Summary:  See above assessment/plan for full details of today's plan. Briefly,     Start norvasc 2.5mg daily. Reason For Visit / Chief Complaint:  Here to establish care with a new cardiologist    HPI:   Magdaleno Slater is a 74 y.o.  male with history as noted in the problem list and further detailed in the above assessment and plan. Here to establish care with a new cardiologist    The patient has a history of hypertension, hyperlipidemia, and atypical chest pain with prior negative nuclear stress testing. Last saw Dr Fran Izaguirre in April 2023 -  no med changes at that time.     Today -  reports the chest pain is sharp. Absolutely random in terms of when it occurs. Last event was when walking in from garage. Sat down and it went away. Typically lasts about 15 minutes (only severe for a few seconds). Occurs about 1 episode per month on average. It is not exertional.  He is a serious road cyclist.  Never gets the chest pain with biking. Can get HR up to 160 with exercise - no sx's with this. Mild SOB since had covid - feels like lung capacity is a bit lower than previously. No palpitations. No syncope. No leg edema. Retired . [employer]. 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 Serious road cyclist.  Also likes mountain biking. . 2 children. Former smoker (quit in 1970s). Rare ETOH. Father had MI - age 80. Cardiac Imaging personally reviewed:  EKG 11-2-23  NSR at 60 bpm.   Holter or event monitor    Echo 2021  EF 60%.     JESSICA    Cardiac MRI    Stress testing Nuclear 2021 -  normal perfusion   Coronary CTA or [facility]    CPET            Patient Active Problem List    Diagnosis Date Noted   • Family history of pancreatic cancer 05/25/2023   • History of COVID-19 01/23/2023   • Chronic pain of both knees 02/16/2021   • Medicare annual wellness visit, subsequent 11/17/2020   • Bilateral primary osteoarthritis of knee 08/13/2020   • Screening for prostate cancer 11/07/2019   • Chest pain 11/07/2019   • ETD (Eustachian tube dysfunction), bilateral 08/21/2019   • Sensorineural hearing loss (SNHL), bilateral 08/21/2019   • Encounter for Medicare annual wellness exam 10/23/2018   • Roberts's esophagus 02/13/2018   • Chronic GERD 08/28/2017   • Acquired hypothyroidism 11/17/2016   • Osteoarthrosis, localized, primary, knee, right 10/13/2015   • Essential hypertension 10/02/2015   • Mixed hyperlipidemia 10/02/2015   • Primary localized osteoarthritis of left knee 08/27/2015       Past Medical History: Diagnosis Date   • Acute non-recurrent maxillary sinusitis 4/15/2019   • Angina effort    • Anxiety    • Roberts's esophagus    • Bleeding from left ear 4/26/2019   • Chest pain 11/7/2019   • Chest pain, unspecified    • Cough 2/13/2018   • GERD (gastroesophageal reflux disease)    • Hyperlipidemia    • Hypertension    • Impacted cerumen of right ear 4/25/2019   • Macular hole, right eye 1/28/2019    Last Assessment & Plan:  Impression:  1. Macular hole, right eye (dx 11/2018 ~295 microns, now 391 micron) 2. PP OD (10/2018) 3. Cataract OS   Plan: - Second opinion.  Lives in [place] with plan for surgery at [address] [facility] getting the surgery as the best way to fix the [place] - Follow up as needed   • Migraine    • Mild depression 11/26/2019   • Otorrhea, left 4/25/2019   • SBO (small bowel obstruction) ([address]) 3/23/2019         Allergies   Allergen Reactions   • Clindamycin GI Intolerance     Per Patient   • Doxycycline Other (See Comments)     unknown   • Influenza Vaccines Other (See Comments)     Flu like symptoms    • Nitrous Oxide        Current Outpatient Medications   Medication Instructions   • albuterol (Ventolin HFA) 90 mcg/act inhaler 2 puffs, Inhalation, Every 6 hours PRN   • amLODIPine (NORVASC) 2.5 mg, Oral, Daily   • aspirin 81 MG tablet Oral   • atorvastatin (LIPITOR) 10 mg, Oral, Daily   • esomeprazole (NEXIUM) 20 mg, Oral, Every other day   • fluticasone (FLONASE) 50 mcg/act nasal spray 1 spray, Nasal, Daily   • latanoprost (XALATAN) 0.005 % ophthalmic solution 1 drop, Both Eyes   • levothyroxine 50 mcg, Oral, Daily, Take on an empty stomach   • methocarbamol (ROBAXIN) 500 mg, Oral, 4 times daily, As needed   • rizatriptan (MAXALT) 10 mg tablet TAKE 1 TABLET BY MOUTH ONCE AS NEEDED FOR MIGRAINE FOR UP TO ONE DOSE. MAY REPEAT IN TWO HOURS IF NEEDED   • timolol (TIMOPTIC) 0.5 % ophthalmic solution 1 drop, Both Eyes, Daily   • valsartan (DIOVAN) 160 mg, Oral, Daily       Social History     Socioeconomic History   • Marital status: /Civil Union     Spouse name: Not on file   • Number of children: Not on file   • Years of education: Not on file   • Highest education level: Not on file   Occupational History   • Not on file   Tobacco Use   • Smoking status: Former     Packs/day: 0.25     Years: 4.00     Total pack years: 1.00     Types: Cigarettes     Quit date: 1     Years since quittin.8   • Smokeless tobacco: Never   Vaping Use   • Vaping Use: Never used   Substance and Sexual Activity   • Alcohol use: Yes   • Drug use: Never   • Sexual activity: Yes     Partners: Female   Other Topics Concern   • Not on file   Social History Narrative   • Not on file     Social Determinants of Health     Financial Resource Strain: Low Risk  (2022)    Overall Financial Resource Strain (CARDIA)    • Difficulty of Paying Living Expenses: Not hard at all   Food Insecurity: Not on file   Transportation Needs: No Transportation Needs (2022)    PRAPARE - Transportation    • Lack of Transportation (Medical): No    • Lack of Transportation (Non-Medical): No   Physical Activity: Not on file   Stress: Not on file   Social Connections: Not on file   Intimate Partner Violence: Not on file   Housing Stability: Not on file       Family History   Problem Relation Age of Onset   • No Known Problems Mother    • Heart attack Father         age 80   • Pancreatic cancer Brother    • Breast cancer Paternal Aunt    • Heart attack Paternal Uncle        Physical Exam:  Blood pressure 160/88, pulse 60, height 5' 1" (1.549 m), weight 68.9 kg (152 lb), SpO2 99 %. Body mass index is 28.72 kg/m². Wt Readings from Last 3 Encounters:   23 68.9 kg (152 lb)   23 68.6 kg (151 lb 3.2 oz)   23 68 kg (150 lb)     Physical Exam  Vitals reviewed. Constitutional:       General: He is not in acute distress. Appearance: He is not toxic-appearing. HENT:      Head: Normocephalic and atraumatic.    Eyes:      General: No scleral icterus. Conjunctiva/sclera: Conjunctivae normal.   Neck:      Vascular: No carotid bruit. Comments: No JVP elevation  Cardiovascular:      Rate and Rhythm: Normal rate and regular rhythm. Heart sounds: No murmur heard. No friction rub. No gallop. Pulmonary:      Breath sounds: Normal breath sounds. No wheezing, rhonchi or rales. Abdominal:      General: There is no distension. Palpations: Abdomen is soft. Tenderness: There is no abdominal tenderness. There is no guarding. Musculoskeletal:      Right lower leg: No edema. Left lower leg: No edema. Skin:     Coloration: Skin is not jaundiced or pale. Findings: No erythema. Neurological:      Mental Status: He is alert. Mental status is at baseline. Psychiatric:         Mood and Affect: Mood normal.         Behavior: Behavior normal.         Labs & Results:  Lab Results   Component Value Date    SODIUM 135 06/05/2023    K 4.2 06/05/2023     06/05/2023    CO2 30 06/05/2023    BUN 13 06/05/2023    CREATININE 1.07 06/05/2023    GLUC 111 03/24/2019    CALCIUM 9.2 06/05/2023         Time Statement: This is a new patient to me although he has previously been seen in the practice so it is a follow-up visit. Extensive chart review performed and all previous cardiac testing reviewed in detail. Total time spent by me on evaluation and management for this visit was 45 minutes. This only includes time spent today (date of encounter) and does NOT include any additional time that was spent prior to the date of encounter. Thank you for the opportunity to participate in the care of this patient.     Carmen Paris MD, [address] [place] [address]  Advanced Heart Failure and Transplant Cardiologist  Director of [address] [place] [address]

## 2023-11-02 ENCOUNTER — OFFICE VISIT (OUTPATIENT)
Dept: CARDIOLOGY CLINIC | Facility: CLINIC | Age: 73
End: 2023-11-02
Payer: COMMERCIAL

## 2023-11-02 VITALS
OXYGEN SATURATION: 99 % | BODY MASS INDEX: 28.7 KG/M2 | HEART RATE: 60 BPM | DIASTOLIC BLOOD PRESSURE: 88 MMHG | SYSTOLIC BLOOD PRESSURE: 160 MMHG | HEIGHT: 61 IN | WEIGHT: 152 LBS

## 2023-11-02 DIAGNOSIS — E78.2 MIXED HYPERLIPIDEMIA: ICD-10-CM

## 2023-11-02 DIAGNOSIS — R07.9 CHEST PAIN, UNSPECIFIED TYPE: Primary | ICD-10-CM

## 2023-11-02 DIAGNOSIS — I10 PRIMARY HYPERTENSION: ICD-10-CM

## 2023-11-02 DIAGNOSIS — R00.1 BRADYCARDIA: ICD-10-CM

## 2023-11-02 PROCEDURE — 99215 OFFICE O/P EST HI 40 MIN: CPT | Performed by: INTERNAL MEDICINE

## 2023-11-02 PROCEDURE — 93000 ELECTROCARDIOGRAM COMPLETE: CPT | Performed by: INTERNAL MEDICINE

## 2023-11-02 RX ORDER — AMLODIPINE BESYLATE 2.5 MG/1
2.5 TABLET ORAL DAILY
Qty: 30 TABLET | Refills: 3 | Status: SHIPPED | OUTPATIENT
Start: 2023-11-02

## 2023-11-07 ENCOUNTER — TELEPHONE (OUTPATIENT)
Dept: GENETICS | Facility: CLINIC | Age: 73
End: 2023-11-07

## 2023-11-07 NOTE — TELEPHONE ENCOUNTER
I called and left a message for Dev Price to confirm her upcoming appointment he was encouraged to log into his [employer] to confirm or call our office.

## 2023-11-09 ENCOUNTER — DOCUMENTATION (OUTPATIENT)
Dept: GENETICS | Facility: CLINIC | Age: 73
End: 2023-11-09

## 2023-11-10 ENCOUNTER — DOCUMENTATION (OUTPATIENT)
Dept: GENETICS | Facility: CLINIC | Age: 73
End: 2023-11-10

## 2023-11-10 ENCOUNTER — CLINICAL SUPPORT (OUTPATIENT)
Dept: GENETICS | Facility: CLINIC | Age: 73
End: 2023-11-10
Payer: COMMERCIAL

## 2023-11-10 DIAGNOSIS — Z80.0 FAMILY HISTORY OF PANCREATIC CANCER: ICD-10-CM

## 2023-11-10 PROCEDURE — 36415 COLL VENOUS BLD VENIPUNCTURE: CPT

## 2023-11-10 NOTE — LETTER
November 10, 2023     Ari Stahl, [address] [facility]  [address] [place] [id number]    Patient: Nish Rose  YOB: 1950  Date of Visit: 11/10/2023      Dear Dr. Therese Serrano:    Thank you for referring Nish Rose to me for evaluation. Below are my notes for this consultation. If you have questions, please do not hesitate to call me. I look forward to following your patient along with you. Sincerely,        Flex Heath        CC: No Recipients        Pre-Test Genetic Counseling Consult Note    Patient Name: Nish Rose   /Age: /87 y.o. Referring Provider:  Ari Stahl DO    Date of Service: 11/10/2023  Genetic Counselor: Flex Heath MS, [facility]  Interpretation Services: None  [facility]  [facility]     Meenu Thurman was referred to the [address] [place], [place] [address] Floor and Genetic Assessment Program due to his family history of pancreatic and breast cancer . he presents today to discuss the possibility of a hereditary cancer syndrome, options for genetic testing, and implications for him and his family.      Cancer History and Treatment:   Personal History: no personal history of cancer    Screening Hx:   Colon:  Colonoscopy (): 4 polyps reported  F/u recommended in 5 years per patient     Skin:  No current screening    Prostate:  Prostate screening (): PSA: 2.4 ng/mL    Other screening: none     Medical and Surgical History  Pertinent surgical history:   Past Surgical History:   Procedure Laterality Date   • CATARACT EXTRACTION     • EYE SURGERY     • HERNIA REPAIR Right       Pertinent medical history:  Past Medical History:   Diagnosis Date   • Acute non-recurrent maxillary sinusitis 4/15/2019   • Angina effort    • Anxiety    • Roberts's esophagus    • Bleeding from left ear 2019   • Chest pain 2019   • Chest pain, unspecified    • Cough 2018   • GERD (gastroesophageal reflux disease)    • Hyperlipidemia • Hypertension    • Impacted cerumen of right ear 2019   • Macular hole, right eye 2019    Last Assessment & Plan:  Impression:  1. Macular hole, right eye (dx 2018 ~295 microns, now 391 micron) 2. PP OD (10/2018) 3. Cataract OS   Plan: - Second opinion. Lives in [place] with plan for surgery at [address] getting the surgery as the best way to fix the [address] - Follow up as needed   • Migraine    • Mild depression 2019   • Otorrhea, left 2019   • SBO (small bowel obstruction) ([address]) 3/23/2019         Other History:  Height:   Ht Readings from Last 1 Encounters:   23 5' 1" (1.549 m)     Weight:   Wt Readings from Last 1 Encounters:   23 68.9 kg (152 lb)       Relevant Family History   Patient reports non-Ashkenazi [employer] ancestry. Maternal Family History:  Uncle: unknown cancer, no information ()    Paternal Family History:  Brother: stage I pancreatic cancer diagnosed at 64, s/p chemo, RT, and resection (currently 61 y/o)   Aunt: breast cancer diagnosed at 62, no info (d.78)  Aunt: breast cancer diagnosed at 62 (d.78)  Aunt: esophageal cancer diagnosed at unknown age; non-smoker but second-hand exposure ()  First-cousin: breast cancer diagnosed at 68 (currently 65 y/o)     Please refer to the scanned pedigree in the Media Tab for a complete family history     *All history is reported as provided by the patient; records are not available for review, except where indicated. Assessment:  We discussed sporadic, familial and hereditary cancer. We reviewed that only 5-10% of cancers are considered hereditary. Cancers such as lung cancer, cervical cancer, testicular cancer, and liver cancer very rarely have a hereditary etiology, and we cannot test for a genetic predisposition for these cancers at this time. We also discussed the many factors that influence our risk for cancer such as age, environmental exposures, lifestyle choices and family history.   Linnell Shone mentioned that he bikes an average of 2300 miles annually. We reviewed the indications suggestive of a hereditary predisposition to cancer. We discussed the possibility of a pathogenic variant in the BRCA1/2 genes given the family history of breast and pancreatic cancer. We discussed the cancer risks and NCCN screening recommendations among individuals with pathogenic variants in the BRCA1/2 gene. Genetic testing is indicated for Bola based on the following criteria: Meets NCCN V2.2024 Testing Criteria for High-Penetrance Breast Cancer Susceptibility Genes: second-degree relative (paternal aunt) with breast cancer and close blood relative (nephew) with pancreatic cancer. Lidia Pizano is unaffected, but is considering genetic testing due to a family history of breast and pancreatic cancer. Although Bola's paternal half-brother is the most informative person to undergo genetic testing, Lidia Pizano can consider genetic testing due to the following:   Patient does not have cancer; however, their half-brother lives in [facility]   and  has declined testing and therefore patient is the most informative   person for testing. The risks, benefits, and limitations of genetic testing were reviewed with the patient, as well as genetic discrimination laws, and possible test results (positive, negative, variants of uncertain significance) and their clinical implications. If positive for a mutation, options for managing cancer risk including increased surveillance, chemoprevention, and in some cases prophylactic surgery were discussed. Lidia Pizano was informed that if a hereditary cancer syndrome was identified in him, first degree relatives (parents, siblings, and children) have a chance of also inheriting the condition. Genetic testing would allow for predictive genetic testing in other relatives, who may also be at risk depending on their degree of relation.      Billing:  Most individuals pay <$100 for hereditary cancer genetic testing. If insurance covers the cost of the testing, individuals may still pay out of pocket secondary to co-pays, co-insurance, or deductibles. If the cost of the testing exceeds $100, the lab will reach out to the patient via phone or e-mail. The patient will then have the option to proceed with the testing, cancel the testing, or elect the self-pay option of $250. Ana Paula Horton verbalized understanding. Plan: Patient decided to proceed with testing and provided consent. Summary:     Sample Collection:  The patient's blood sample was drawn in the office on 11/10/23 by medical assistant William Goldsmith    Genetic Testing Preformed: [employer]: [employer]® +Wireless [employer]® (61 genes): APC, LISA, AXIN2, BAP1, BARD1, BMPR1A, BRCA1, BRCA2, BRIP1, CDH1, CDK4, CDKN1B, CDKN2A, CHEK2, CTNNA1, DICER1, EGLN1, EPCAM, FH, FLCN, GREM1, HOXB13, KIF1B, KIT, MAX, MEN1, MET, MITF, MLH1, MSH2, MSH3, MSH6, MUTYH, NBN, NF1, NTHL1, PALB2, PMS2, POLD1, POLE, POT1, PTEN, RAD50, RAD51C, RAD51D, RB1, RECQL, RET, SDHA, SDHAF2, SDHB, SDHC, SDHD, SMAD4, SMARCA4, STK11, RXOM549, TP53, TSC1, TSC2, VHL      Results take approximately 2-3 weeks to complete once test is started. Ana Paula Horton will be notified via [employer] once results are available. Additional recommendations for surveillance/medical management will be made pending genetic test results.

## 2023-11-10 NOTE — PROGRESS NOTES
Pre-Test Genetic Counseling Consult Note    Patient Name: Greyson Castillo   /Age: /24 y.o. Referring Provider:  Fady Lopes DO    Date of Service: 11/10/2023  Genetic Counselor: Annia Ramirez MS, [facility]  Interpretation Services: None  [facility]  [facility]     Dread Covarrubias was referred to the [address] [place], [place] [address] and Genetic Assessment Program due to his family history of pancreatic and breast cancer . he presents today to discuss the possibility of a hereditary cancer syndrome, options for genetic testing, and implications for him and his family. Cancer History and Treatment:   Personal History: no personal history of cancer    Screening Hx:   Colon:  Colonoscopy (): 4 polyps reported  F/u recommended in 5 years per patient     Skin:  No current screening    Prostate:  Prostate screening (): PSA: 2.4 ng/mL    Other screening: none     Medical and Surgical History  Pertinent surgical history:   Past Surgical History:   Procedure Laterality Date    CATARACT EXTRACTION      EYE SURGERY      HERNIA REPAIR Right       Pertinent medical history:  Past Medical History:   Diagnosis Date    Acute non-recurrent maxillary sinusitis 4/15/2019    Angina effort     Anxiety     Roberts's esophagus     Bleeding from left ear 2019    Chest pain 2019    Chest pain, unspecified     Cough 2018    GERD (gastroesophageal reflux disease)     Hyperlipidemia     Hypertension     Impacted cerumen of right ear 2019    Macular hole, right eye 2019    Last Assessment & Plan:  Impression:  1. Macular hole, right eye (dx 2018 ~295 microns, now 391 micron) 2. PP OD (10/2018) 3. Cataract OS   Plan: - Second opinion.  Lives in [place] with plan for surgery at [address] [place] [address] getting the surgery as the best way to fix the [address] [place] [address] - Follow up as needed    Migraine     Mild depression 2019    Otorrhea, left 2019    SBO (small bowel obstruction) ([address]) 3/23/2019         Other History:  Height:   Ht Readings from Last 1 Encounters:   23 5' 1" (1.549 m)     Weight:   Wt Readings from Last 1 Encounters:   23 68.9 kg (152 lb)       Relevant Family History   Patient reports non-Ashkenazi Gnosticism ancestry. Maternal Family History:  Uncle: unknown cancer, no information ()    Paternal Family History:  Brother: stage I pancreatic cancer diagnosed at 64, s/p chemo, RT, and resection (currently 63 y/o)   Aunt: breast cancer diagnosed at 62, no info (d.78)  Aunt: breast cancer diagnosed at 62 (d.78)  Aunt: esophageal cancer diagnosed at unknown age; non-smoker but second-hand exposure ()  First-cousin: breast cancer diagnosed at 68 (currently 67 y/o)     Please refer to the scanned pedigree in the Media Tab for a complete family history     *All history is reported as provided by the patient; records are not available for review, except where indicated. Assessment:  We discussed sporadic, familial and hereditary cancer. We reviewed that only 5-10% of cancers are considered hereditary. Cancers such as lung cancer, cervical cancer, testicular cancer, and liver cancer very rarely have a hereditary etiology, and we cannot test for a genetic predisposition for these cancers at this time. We also discussed the many factors that influence our risk for cancer such as age, environmental exposures, lifestyle choices and family history. Yunior Pate mentioned that he bikes an average of 2300 miles annually. We reviewed the indications suggestive of a hereditary predisposition to cancer. We discussed the possibility of a pathogenic variant in the BRCA1/2 genes given the family history of breast and pancreatic cancer. We discussed the cancer risks and NCCN screening recommendations among individuals with pathogenic variants in the BRCA1/2 gene.      Genetic testing is indicated for Bola based on the following criteria: Meets NCCN V2.2024 Testing Criteria for High-Penetrance Breast Cancer Susceptibility Genes: second-degree relative (paternal aunt) with breast cancer and close blood relative (nephew) with pancreatic cancer. Pedro Munoz is unaffected, but is considering genetic testing due to a family history of breast and pancreatic cancer. Although Bola's paternal half-brother is the most informative person to undergo genetic testing, Pedro Munoz can consider genetic testing due to the following:   Patient does not have cancer; however, their half-brother lives in [facility]   and  has declined testing and therefore patient is the most informative   person for testing. The risks, benefits, and limitations of genetic testing were reviewed with the patient, as well as genetic discrimination laws, and possible test results (positive, negative, variants of uncertain significance) and their clinical implications. If positive for a mutation, options for managing cancer risk including increased surveillance, chemoprevention, and in some cases prophylactic surgery were discussed. Pedro Munoz was informed that if a hereditary cancer syndrome was identified in him, first degree relatives (parents, siblings, and children) have a chance of also inheriting the condition. Genetic testing would allow for predictive genetic testing in other relatives, who may also be at risk depending on their degree of relation. Billing:  Most individuals pay <$100 for hereditary cancer genetic testing. If insurance covers the cost of the testing, individuals may still pay out of pocket secondary to co-pays, co-insurance, or deductibles. If the cost of the testing exceeds $100, the lab will reach out to the patient via phone or e-mail. The patient will then have the option to proceed with the testing, cancel the testing, or elect the self-pay option of $250. Pedro Munoz verbalized understanding. Plan: Patient decided to proceed with testing and provided consent.     Summary: Sample Collection:  The patient's blood sample was drawn in the office on 11/10/23 by medical assistant Eric Rosario    Genetic Testing Preformed: [employer]: Cancer® +LessThan3® (61 genes): APC, LISA, AXIN2, BAP1, BARD1, BMPR1A, BRCA1, BRCA2, BRIP1, CDH1, CDK4, CDKN1B, CDKN2A, CHEK2, CTNNA1, DICER1, EGLN1, EPCAM, FH, FLCN, GREM1, HOXB13, KIF1B, KIT, MAX, MEN1, MET, MITF, MLH1, MSH2, MSH3, MSH6, MUTYH, NBN, NF1, NTHL1, PALB2, PMS2, POLD1, POLE, POT1, PTEN, RAD50, RAD51C, RAD51D, RB1, RECQL, RET, SDHA, SDHAF2, SDHB, SDHC, SDHD, SMAD4, SMARCA4, STK11, XKMS305, TP53, TSC1, TSC2, VHL      Results take approximately 2-3 weeks to complete once test is started. Adi Vallejou will be notified via [employer] once results are available. Additional recommendations for surveillance/medical management will be made pending genetic test results.

## 2023-11-17 ENCOUNTER — OFFICE VISIT (OUTPATIENT)
Dept: OBGYN CLINIC | Facility: MEDICAL CENTER | Age: 73
End: 2023-11-17
Payer: COMMERCIAL

## 2023-11-17 VITALS
DIASTOLIC BLOOD PRESSURE: 82 MMHG | BODY MASS INDEX: 28.32 KG/M2 | SYSTOLIC BLOOD PRESSURE: 152 MMHG | HEIGHT: 61 IN | WEIGHT: 150 LBS

## 2023-11-17 DIAGNOSIS — M17.11 PRIMARY OSTEOARTHRITIS OF RIGHT KNEE: Primary | ICD-10-CM

## 2023-11-17 DIAGNOSIS — S76.311A STRAIN OF RIGHT HAMSTRING MUSCLE, INITIAL ENCOUNTER: ICD-10-CM

## 2023-11-17 DIAGNOSIS — M17.12 PRIMARY OSTEOARTHRITIS OF LEFT KNEE: ICD-10-CM

## 2023-11-17 PROCEDURE — 20610 DRAIN/INJ JOINT/BURSA W/O US: CPT | Performed by: ORTHOPAEDIC SURGERY

## 2023-11-17 PROCEDURE — 99213 OFFICE O/P EST LOW 20 MIN: CPT | Performed by: ORTHOPAEDIC SURGERY

## 2023-11-17 RX ORDER — BETAMETHASONE SODIUM PHOSPHATE AND BETAMETHASONE ACETATE 3; 3 MG/ML; MG/ML
12 INJECTION, SUSPENSION INTRA-ARTICULAR; INTRALESIONAL; INTRAMUSCULAR; SOFT TISSUE
Status: COMPLETED | OUTPATIENT
Start: 2023-11-17 | End: 2023-11-17

## 2023-11-17 RX ORDER — LIDOCAINE HYDROCHLORIDE 10 MG/ML
2 INJECTION, SOLUTION INFILTRATION; PERINEURAL
Status: COMPLETED | OUTPATIENT
Start: 2023-11-17 | End: 2023-11-17

## 2023-11-17 RX ORDER — BUPIVACAINE HYDROCHLORIDE 2.5 MG/ML
2 INJECTION, SOLUTION INFILTRATION; PERINEURAL
Status: COMPLETED | OUTPATIENT
Start: 2023-11-17 | End: 2023-11-17

## 2023-11-17 RX ORDER — METHOCARBAMOL 500 MG/1
500 TABLET, FILM COATED ORAL 4 TIMES DAILY
Qty: 60 TABLET | Refills: 2 | Status: SHIPPED | OUTPATIENT
Start: 2023-11-17

## 2023-11-17 RX ADMIN — LIDOCAINE HYDROCHLORIDE 2 ML: 10 INJECTION, SOLUTION INFILTRATION; PERINEURAL at 14:15

## 2023-11-17 RX ADMIN — BUPIVACAINE HYDROCHLORIDE 2 ML: 2.5 INJECTION, SOLUTION INFILTRATION; PERINEURAL at 14:15

## 2023-11-17 RX ADMIN — BETAMETHASONE SODIUM PHOSPHATE AND BETAMETHASONE ACETATE 12 MG: 3; 3 INJECTION, SUSPENSION INTRA-ARTICULAR; INTRALESIONAL; INTRAMUSCULAR; SOFT TISSUE at 14:15

## 2023-11-17 NOTE — PROGRESS NOTES
Assessment:  1. Primary osteoarthritis of right knee        2. Strain of right hamstring muscle, initial encounter        3. Primary osteoarthritis of left knee            Plan:  Bilateral knee osteoarthritis  The patient was provided with bilateral knee steroid injections. The patient tolerated the procedure well. Methocarbamol 500mg refilled   The patient should follow up in 3 months. To do next visit:  Return in about 3 months (around 2/17/2024). The above stated was discussed in layman's terms and the patient expressed understanding. All questions were answered to the patient's satisfaction. Scribe Attestation      I,:  Judith Garcia am acting as a scribe while in the presence of the attending physician.:       I,:  Thu Aguilar MD personally performed the services described in this documentation    as scribed in my presence.:               Subjective:   John Esposito is a 74 y.o. male who presents for follow up of bilateral knees. He is s/p left knee steroid injection with benefit, 8/18/2023. Today he complains of bilateral generalized knee pain. Prolonged weight bearing and repetitive bending aggravates while rest alleviates. He does use methocarbamol 500mg with benefit. Review of systems negative unless otherwise specified in HPI    Past Medical History:   Diagnosis Date    Acute non-recurrent maxillary sinusitis 4/15/2019    Angina effort     Anxiety     Roberts's esophagus     Bleeding from left ear 4/26/2019    Chest pain 11/7/2019    Chest pain, unspecified     Cough 2/13/2018    GERD (gastroesophageal reflux disease)     Hyperlipidemia     Hypertension     Impacted cerumen of right ear 4/25/2019    Macular hole, right eye 1/28/2019    Last Assessment & Plan:  Impression:  1. Macular hole, right eye (dx 11/2018 ~295 microns, now 391 micron) 2. PP OD (10/2018) 3. Cataract OS   Plan: - Second opinion.  Lives in [place] with plan for surgery at [facility] getting the surgery as the best way to fix the [address] [facility] [address] - Follow up as needed    Migraine     Mild depression 2019    Otorrhea, left 2019    SBO (small bowel obstruction) ([address]) 3/23/2019       Past Surgical History:   Procedure Laterality Date    CATARACT EXTRACTION      EYE SURGERY      HERNIA REPAIR Right 2014       Family History   Problem Relation Age of Onset    No Known Problems Mother     Heart attack Father         age 80    Pancreatic cancer Brother     Breast cancer Paternal Aunt     Heart attack Paternal Uncle        Social History     Occupational History    Not on file   Tobacco Use    Smoking status: Former     Packs/day: 0.25     Years: 4.00     Total pack years: 1.00     Types: Cigarettes     Quit date:      Years since quittin.9    Smokeless tobacco: Never   Vaping Use    Vaping Use: Never used   Substance and Sexual Activity    Alcohol use: Yes    Drug use: Never    Sexual activity: Yes     Partners: Female         Current Outpatient Medications:     albuterol (Ventolin HFA) 90 mcg/act inhaler, Inhale 2 puffs every 6 (six) hours as needed for wheezing, Disp: 6.7 g, Rfl: 0    amLODIPine (NORVASC) 2.5 mg tablet, Take 1 tablet (2.5 mg total) by mouth daily, Disp: 30 tablet, Rfl: 3    aspirin 81 MG tablet, Take by mouth, Disp: , Rfl:     atorvastatin (LIPITOR) 10 mg tablet, Take 1 tablet (10 mg total) by mouth daily, Disp: 90 tablet, Rfl: 3    esomeprazole (NexIUM) 20 mg capsule, Take 20 mg by mouth every other day , Disp: , Rfl:     latanoprost (XALATAN) 0.005 % ophthalmic solution, Administer 1 drop to both eyes , Disp: , Rfl:     levothyroxine 50 mcg tablet, Take 1 tablet (50 mcg total) by mouth daily Take on an empty stomach, Disp: 90 tablet, Rfl: 3    methocarbamol (ROBAXIN) 500 mg tablet, Take 1 tablet (500 mg total) by mouth 4 (four) times a day As needed, Disp: 30 tablet, Rfl: 1    rizatriptan (MAXALT) 10 mg tablet, TAKE 1 TABLET BY MOUTH ONCE AS NEEDED FOR MIGRAINE FOR UP TO ONE DOSE. MAY REPEAT IN TWO HOURS IF NEEDED, Disp: 9 tablet, Rfl: 0    timolol (TIMOPTIC) 0.5 % ophthalmic solution, Administer 1 drop to both eyes daily, Disp: , Rfl:     valsartan (DIOVAN) 160 mg tablet, Take 1 tablet (160 mg total) by mouth daily, Disp: 90 tablet, Rfl: 0    fluticasone (FLONASE) 50 mcg/act nasal spray, 1 spray into each nostril daily for 7 days (Patient not taking: Reported on 11/17/2023), Disp: 16 g, Rfl: 5    Allergies   Allergen Reactions    Clindamycin GI Intolerance     Per Patient    Doxycycline Other (See Comments)     unknown    Influenza Vaccines Other (See Comments)     Flu like symptoms     Nitrous Oxide             Vitals:    11/17/23 1421   BP: 152/82       Objective:  Physical exam  General: Awake, Alert, Oriented  Eyes: Pupils equal, round and reactive to light  Heart: regular rate and rhythm  Lungs: No audible wheezing  Abdomen: soft                    Ortho Exam  Bilateral knees:  No erythema or ecchymosis  No effusion or swelling  Normal strength  Good ROM with crepitus   Calf compartments soft and supple  Sensation intact  Toes are warm sensate and mobile      Diagnostics, reviewed and taken today if performed as documented:    None performed     Procedures, if performed today:    Large joint arthrocentesis: R knee  Universal Protocol:  Consent: Verbal consent obtained. Risks and benefits: risks, benefits and alternatives were discussed  Consent given by: patient  Time out: Immediately prior to procedure a "time out" was called to verify the correct patient, procedure, equipment, support staff and site/side marked as required.   Timeout called at: 11/17/2023 2:55 PM.  Patient understanding: patient states understanding of the procedure being performed  Site marked: the operative site was marked  Patient identity confirmed: verbally with patient  Supporting Documentation  Indications: pain   Procedure Details  Location: knee - R knee  Preparation: Patient was prepped and draped in the usual sterile fashion  Needle size: 22 G  Ultrasound guidance: no  Approach: anterolateral  Medications administered: 12 mg betamethasone acetate-betamethasone sodium phosphate 6 (3-3) mg/mL; 2 mL bupivacaine 0.25 %; 2 mL lidocaine 1 %    Patient tolerance: patient tolerated the procedure well with no immediate complications  Dressing:  Sterile dressing applied      Large joint arthrocentesis: L knee  Universal Protocol:  Consent: Verbal consent obtained. Risks and benefits: risks, benefits and alternatives were discussed  Consent given by: patient  Time out: Immediately prior to procedure a "time out" was called to verify the correct patient, procedure, equipment, support staff and site/side marked as required. Timeout called at: 11/17/2023 2:55 PM.  Patient understanding: patient states understanding of the procedure being performed  Site marked: the operative site was marked  Patient identity confirmed: verbally with patient  Supporting Documentation  Indications: pain   Procedure Details  Location: knee - L knee  Preparation: Patient was prepped and draped in the usual sterile fashion  Needle size: 22 G  Ultrasound guidance: no  Approach: anterolateral  Medications administered: 12 mg betamethasone acetate-betamethasone sodium phosphate 6 (3-3) mg/mL; 2 mL bupivacaine 0.25 %; 2 mL lidocaine 1 %    Patient tolerance: patient tolerated the procedure well with no immediate complications  Dressing:  Sterile dressing applied                Portions of the record may have been created with voice recognition software. Occasional wrong word or "sound a like" substitutions may have occurred due to the inherent limitations of voice recognition software. Read the chart carefully and recognize, using context, where substitutions have occurred.

## 2023-11-24 ENCOUNTER — TELEPHONE (OUTPATIENT)
Age: 73
End: 2023-11-24

## 2023-11-24 NOTE — TELEPHONE ENCOUNTER
Spoke to the pt who states he believes the amlodipine is making him extremely drowsy and does not believe is helping lower his BP. Pt has not taken the medication for the last 2 days and has noticed improvement in side effects. The patient is open to increasing dosage of a different medication or trying a different one, should it be deemed necessary.  Home BP readings are as follows:    11/12    129/74  11/14    139/78  11/17    152/82  11/18    124/68  11/21    136/80

## 2023-11-24 NOTE — TELEPHONE ENCOUNTER
----- Message from Jonny Acevedo MD sent at 11/24/2023  9:02 AM EST -----  Regarding: FW: amLODIPine appears to give me extreme fatigue. Contact: [phone]    Ross Wise -     Can you call him to touch base. If his blood pressure is at goal and he thinks he is having side effects from the amlodipine --  he can certainly discontinue it. However I would like to note that anxiousness and drowsiness would not be common side effects from amlodipine -- so he also needs to consider other causes of the these symptoms. Thanks so much  MDM              ----- Message -----  From: Aniyah Valencia RN  Sent: 11/24/2023   7:46 AM EST  To: Jonny Acevedo MD  Subject: FW: amLODIPine appears to give me extreme fa#      ----- Message -----  From: Clara Magallon: 11/22/2023   6:02 PM EST  To: [facility]  Subject: amLODIPine appears to give me extreme fatigu#    Since I have started on amLODIPine 2.5mg, the change in my blood pressure, appears to show a drop. However, based on the rest of the data the change is not statistically significant. Part of the appearance of sharp drop is due to the sharp increase on my Nov 2 visit (doctor's office systolic shut up to from 134 on 10/27/23 to 160 on 11/2/23). The pill, however, makes me very drowsy and somewhat anxious. I would have hanged my ill temper also on that, but I guess I have to take some responsibility in that department. I would like to discount this medication.

## 2023-11-28 ENCOUNTER — OFFICE VISIT (OUTPATIENT)
Dept: FAMILY MEDICINE CLINIC | Facility: CLINIC | Age: 73
End: 2023-11-28
Payer: COMMERCIAL

## 2023-11-28 VITALS
HEIGHT: 62 IN | TEMPERATURE: 97.5 F | HEART RATE: 71 BPM | WEIGHT: 155 LBS | SYSTOLIC BLOOD PRESSURE: 124 MMHG | OXYGEN SATURATION: 98 % | RESPIRATION RATE: 16 BRPM | DIASTOLIC BLOOD PRESSURE: 80 MMHG | BODY MASS INDEX: 28.52 KG/M2

## 2023-11-28 DIAGNOSIS — E78.2 MIXED HYPERLIPIDEMIA: ICD-10-CM

## 2023-11-28 DIAGNOSIS — I10 ESSENTIAL HYPERTENSION: ICD-10-CM

## 2023-11-28 DIAGNOSIS — K22.70 BARRETT'S ESOPHAGUS WITHOUT DYSPLASIA: ICD-10-CM

## 2023-11-28 DIAGNOSIS — Z12.5 SCREENING FOR PROSTATE CANCER: ICD-10-CM

## 2023-11-28 DIAGNOSIS — E03.9 ACQUIRED HYPOTHYROIDISM: Primary | ICD-10-CM

## 2023-11-28 PROBLEM — R07.9 CHEST PAIN: Status: RESOLVED | Noted: 2019-11-07 | Resolved: 2023-11-28

## 2023-11-28 PROCEDURE — 99214 OFFICE O/P EST MOD 30 MIN: CPT | Performed by: FAMILY MEDICINE

## 2023-11-28 RX ORDER — FEXOFENADINE HCL 60 MG/1
60 TABLET, FILM COATED ORAL DAILY
COMMUNITY
Start: 2021-01-01

## 2023-11-28 NOTE — PROGRESS NOTES
Assessment/Plan:    1. Acquired hypothyroidism  Assessment & Plan:  Stable on levo      2. Essential hypertension  Assessment & Plan:  Stabl erica diovan    Orders:  -     CBC; Future; Expected date: 01/14/2024  -     Comprehensive metabolic panel; Future; Expected date: 01/14/2024    3. Mixed hyperlipidemia  Assessment & Plan:  Stable on lipitor    Orders:  -     Lipid Panel with Direct LDL reflex; Future; Expected date: 01/14/2024  -     TSH, 3rd generation with Free T4 reflex; Future; Expected date: 01/14/2024    4. Screening for prostate cancer  -     PSA, Total Screen; Future; Expected date: 01/14/2024    5. Roberts's esophagus without dysplasia  Assessment & Plan: On nexium    Orders:  -     Ambulatory Referral to Gastroenterology; Future        Depression Screening and Follow-up Plan: Patient was screened for depression during today's encounter. They screened negative with a PHQ-2 score of 1. There are no Patient Instructions on file for this visit. Return in about 3 months (around 2/28/2024) for Annual physical.    Subjective:      Patient ID: Enoc Knapp is a 74 y.o. male. Chief Complaint   Patient presents with   • Follow-up     Patient being seen for 6 month follow up        Here for follow up  Just started weight lifting  Biking 5 days a week  Stopped amlodipine due to depression    Hypertension  This is a chronic problem. The current episode started more than 1 year ago. The problem is unchanged. The problem is controlled. There are no associated agents to hypertension. Past treatments include angiotensin blockers. The current treatment provides significant improvement. There are no compliance problems. The following portions of the patient's history were reviewed and updated as appropriate: allergies, current medications, past family history, past medical history, past social history, past surgical history and problem list.    Review of Systems   Constitutional: Negative.     HENT: Negative. Eyes: Negative. Respiratory: Negative. Cardiovascular: Negative. Gastrointestinal: Negative. Endocrine: Negative. Genitourinary: Negative. Musculoskeletal: Negative. Allergic/Immunologic: Negative. Neurological: Negative. Hematological: Negative. Psychiatric/Behavioral: Negative. Current Outpatient Medications   Medication Sig Dispense Refill   • albuterol (Ventolin HFA) 90 mcg/act inhaler Inhale 2 puffs every 6 (six) hours as needed for wheezing 6.7 g 0   • aspirin 81 MG tablet Take by mouth     • atorvastatin (LIPITOR) 10 mg tablet Take 1 tablet (10 mg total) by mouth daily 90 tablet 3   • esomeprazole (NexIUM) 20 mg capsule Take 20 mg by mouth every other day      • fexofenadine (Allegra Allergy) 60 MG tablet Take 60 mg by mouth daily     • latanoprost (XALATAN) 0.005 % ophthalmic solution Administer 1 drop to both eyes      • levothyroxine 50 mcg tablet Take 1 tablet (50 mcg total) by mouth daily Take on an empty stomach 90 tablet 3   • methocarbamol (ROBAXIN) 500 mg tablet Take 1 tablet (500 mg total) by mouth 4 (four) times a day As needed 60 tablet 2   • rizatriptan (MAXALT) 10 mg tablet TAKE 1 TABLET BY MOUTH ONCE AS NEEDED FOR MIGRAINE FOR UP TO ONE DOSE. MAY REPEAT IN TWO HOURS IF NEEDED 9 tablet 0   • timolol (TIMOPTIC) 0.5 % ophthalmic solution Administer 1 drop to both eyes daily     • valsartan (DIOVAN) 160 mg tablet Take 1 tablet (160 mg total) by mouth daily 90 tablet 0     No current facility-administered medications for this visit. Objective:    /80 (BP Location: Left arm, Patient Position: Sitting, Cuff Size: Standard)   Pulse 71   Temp 97.5 °F (36.4 °C) (Tympanic)   Resp 16   Ht 5' 1.65" (1.566 m)   Wt 70.3 kg (155 lb)   SpO2 98%   BMI 28.67 kg/m²        Physical Exam  Vitals and nursing note reviewed. Constitutional:       Appearance: Normal appearance. He is well-developed.    HENT:      Head: Normocephalic and atraumatic. Right Ear: External ear normal.      Left Ear: External ear normal.      Nose: Nose normal.   Eyes:      General: Lids are normal.      Extraocular Movements: Extraocular movements intact. Conjunctiva/sclera: Conjunctivae normal.      Pupils: Pupils are equal, round, and reactive to light. Cardiovascular:      Rate and Rhythm: Normal rate and regular rhythm. Pulses: Normal pulses. Heart sounds: Normal heart sounds, S1 normal and S2 normal.   Pulmonary:      Effort: Pulmonary effort is normal.      Breath sounds: Normal breath sounds. Abdominal:      General: Abdomen is flat. Bowel sounds are normal.      Palpations: Abdomen is soft. Musculoskeletal:         General: Normal range of motion. Cervical back: Normal range of motion and neck supple. Skin:     General: Skin is warm and dry. Neurological:      General: No focal deficit present. Mental Status: He is alert and oriented to person, place, and time. Deep Tendon Reflexes: Reflexes are normal and symmetric. Psychiatric:         Mood and Affect: Mood normal.         Speech: Speech normal.         Behavior: Behavior normal.         Thought Content:  Thought content normal.         Judgment: Judgment normal.                José Miguel Hansen,

## 2023-11-29 ENCOUNTER — PROBLEM (OUTPATIENT)
Dept: URBAN - METROPOLITAN AREA CLINIC 6 | Facility: CLINIC | Age: 73
End: 2023-11-29

## 2023-11-29 DIAGNOSIS — H43.392: ICD-10-CM

## 2023-11-29 DIAGNOSIS — H40.1132: ICD-10-CM

## 2023-11-29 DIAGNOSIS — H04.123: ICD-10-CM

## 2023-11-29 DIAGNOSIS — H35.373: ICD-10-CM

## 2023-11-29 DIAGNOSIS — H53.19: ICD-10-CM

## 2023-11-29 PROCEDURE — 92014 COMPRE OPH EXAM EST PT 1/>: CPT

## 2023-11-29 ASSESSMENT — TONOMETRY
OD_IOP_MMHG: 23
OS_IOP_MMHG: 23

## 2023-11-29 ASSESSMENT — VISUAL ACUITY
OD_SC: 20/40-2
OS_SC: 20/25

## 2023-12-01 DIAGNOSIS — I10 ESSENTIAL HYPERTENSION: ICD-10-CM

## 2023-12-01 RX ORDER — VALSARTAN 160 MG/1
160 TABLET ORAL DAILY
Qty: 90 TABLET | Refills: 1 | Status: SHIPPED | OUTPATIENT
Start: 2023-12-01

## 2023-12-15 ENCOUNTER — TELEPHONE (OUTPATIENT)
Age: 73
End: 2023-12-15

## 2023-12-18 ENCOUNTER — ESTABLISHED COMPREHENSIVE EXAM (OUTPATIENT)
Dept: URBAN - METROPOLITAN AREA CLINIC 6 | Facility: CLINIC | Age: 73
End: 2023-12-18

## 2023-12-18 ENCOUNTER — TELEPHONE (OUTPATIENT)
Dept: CARDIOLOGY CLINIC | Facility: CLINIC | Age: 73
End: 2023-12-18

## 2023-12-18 DIAGNOSIS — H35.373: ICD-10-CM

## 2023-12-18 DIAGNOSIS — H40.1132: ICD-10-CM

## 2023-12-18 DIAGNOSIS — H04.123: ICD-10-CM

## 2023-12-18 DIAGNOSIS — H53.19: ICD-10-CM

## 2023-12-18 DIAGNOSIS — H43.392: ICD-10-CM

## 2023-12-18 PROCEDURE — 92014 COMPRE OPH EXAM EST PT 1/>: CPT

## 2023-12-18 PROCEDURE — 92250 FUNDUS PHOTOGRAPHY W/I&R: CPT

## 2023-12-18 PROCEDURE — 92083 EXTENDED VISUAL FIELD XM: CPT

## 2023-12-18 ASSESSMENT — VISUAL ACUITY
OD_SC: 20/30+2
OS_SC: 20/25

## 2023-12-18 ASSESSMENT — TONOMETRY
OS_IOP_MMHG: 17
OD_IOP_MMHG: 16

## 2023-12-18 NOTE — TELEPHONE ENCOUNTER
----- Message from Bola Sanders sent at 11/24/2023  4:07 PM EST -----  Regarding: Home Bood Pressure Monitoring  Contact: [phone]  Will do, [employer]

## 2023-12-18 NOTE — TELEPHONE ENCOUNTER
LVOM for pt, called to check on home BP monitoring. Instructed patient to call my direct TEAMS number [phone] when possible.

## 2023-12-26 ENCOUNTER — OFFICE VISIT (OUTPATIENT)
Dept: GASTROENTEROLOGY | Facility: CLINIC | Age: 73
End: 2023-12-26
Payer: COMMERCIAL

## 2023-12-26 VITALS
WEIGHT: 155.8 LBS | HEIGHT: 62 IN | DIASTOLIC BLOOD PRESSURE: 107 MMHG | HEART RATE: 63 BPM | BODY MASS INDEX: 28.67 KG/M2 | SYSTOLIC BLOOD PRESSURE: 184 MMHG

## 2023-12-26 DIAGNOSIS — K21.9 CHRONIC GERD: ICD-10-CM

## 2023-12-26 DIAGNOSIS — K22.70 BARRETT'S ESOPHAGUS WITHOUT DYSPLASIA: Primary | ICD-10-CM

## 2023-12-26 PROCEDURE — 99204 OFFICE O/P NEW MOD 45 MIN: CPT | Performed by: INTERNAL MEDICINE

## 2023-12-26 NOTE — H&P (VIEW-ONLY)
[facility] - Outpatient Consultation  Bola Sanders 73 y.o. male MRN: [id number]  Encounter: [id number]      PCP: Estefania Minor DO  Referring: No referring provider defined for this encounter.      ASSESSMENT AND PLAN:      1. Roberts's esophagus without dysplasia  2. Chronic GERD  Previous endoscopic diagnosis of Roberts's esophagus in 2019, biopsies from this area are not available for my review  He continues on Nexium 20 mg  We discussed risk factors of Roberts's esophagus including chronic alcohol use, chronic NSAIDs, tobacco use, which I recommend limiting  - EGD; Future, assess for evidence of Roberts's esophagus, continue lowest effective dose of PPI  - esomeprazole (NexIUM) 20 mg capsule; Take 1 capsule (20 mg total) by mouth every other day  Dispense: 90 capsule; Refill: 3    ______________________________________________________________________    CC:  Chief Complaint   Patient presents with    Consult       HPI:      Patient is a 73-year-old male referred for Roberts's esophagus and GERD.  He has acquired hypothyroidism, HTN, bilateral eustachian tube dysfunction with sensorineural hearing loss, osteoarthritis, HLD, BMI 28.  He takes aspirin 81 mg daily.  He is on Nexium 20 mg daily with good control of his symptoms. He does take aleve occasionally, < 3 times/week. He drinks 1-3 drinks/week.     EGD 2019- [facility]ette-endoscopic appearance of BE- bx results not available for review  Colonoscopy Jan/2023-Dr. Clark-2 subcentimeter cecal tubular adenomas, recall 5 years    REVIEW OF SYSTEMS:    CONSTITUTIONAL: Denies any fever, chills, rigors, and weight loss.  HEENT: No earache or tinnitus. Denies hearing loss or visual disturbances.  CARDIOVASCULAR: No chest pain or palpitations.   RESPIRATORY: Denies any cough, hemoptysis, shortness of breath or dyspnea on exertion.  GASTROINTESTINAL: As noted in the History of Present Illness.   GENITOURINARY: No problems with urination.  Denies any hematuria or dysuria.  NEUROLOGIC: No dizziness or vertigo, denies headaches.   MUSCULOSKELETAL: Denies any muscle or joint pain.   SKIN: Denies skin rashes or itching.   ENDOCRINE: Denies excessive thirst. Denies intolerance to heat or cold.  PSYCHOSOCIAL: Denies depression or anxiety. Denies any recent memory loss.       Historical Information   Past Medical History:   Diagnosis Date    Acute non-recurrent maxillary sinusitis 04/15/2019    Allergic     Angina effort     Anxiety     Roberts esophagus     Roberts's esophagus     Bleeding from left ear 2019    Chest pain 2019    Chest pain, unspecified     Cough 2018    GERD (gastroesophageal reflux disease)     Hernia     HL (hearing loss) 10 years ago    Hyperlipidemia     Hypertension     Impacted cerumen of right ear 2019    Macular hole, right eye 2019    Last Assessment & Plan:  Impression:  1. Macular hole, right eye (dx 2018 ~295 microns, now 391 micron) 2. PP OD (10/2018) 3. Cataract OS   Plan: - Second opinion. Lives in [place] with plan for surgery at [facility]. - Advocated getting the surgery as the best way to fix the MH - Follow up as needed    Migraine     Mild depression 2019    Otorrhea, left 2019    SBO (small bowel obstruction) ([facility]) 2019     Past Surgical History:   Procedure Laterality Date    CATARACT EXTRACTION      COLONOSCOPY      EYE SURGERY      HERNIA REPAIR Right      Social History   Social History     Substance and Sexual Activity   Alcohol Use Yes    Alcohol/week: 4.0 standard drinks of alcohol    Types: 2 Glasses of wine, 2 Shots of liquor per week     Social History     Substance and Sexual Activity   Drug Use Never     Social History     Tobacco Use   Smoking Status Former    Current packs/day: 0.00    Average packs/day: 0.3 packs/day for 10.0 years (2.5 ttl pk-yrs)    Types: Cigarettes    Start date: 1968    Quit date: 1978    Years since quittin.0  "  Smokeless Tobacco Never     Family History   Problem Relation Age of Onset    No Known Problems Mother     Heart attack Father         age 83    Heart disease Father     Hearing loss Father     Pancreatic cancer Brother     Breast cancer Paternal Aunt     Heart attack Paternal Uncle     Stroke Paternal Grandmother        Meds/Allergies       Current Outpatient Medications:     albuterol (Ventolin HFA) 90 mcg/act inhaler    aspirin 81 MG tablet    atorvastatin (LIPITOR) 10 mg tablet    esomeprazole (NexIUM) 20 mg capsule    fexofenadine (Allegra Allergy) 60 MG tablet    latanoprost (XALATAN) 0.005 % ophthalmic solution    levothyroxine 50 mcg tablet    methocarbamol (ROBAXIN) 500 mg tablet    rizatriptan (MAXALT) 10 mg tablet    timolol (TIMOPTIC) 0.5 % ophthalmic solution    valsartan (DIOVAN) 160 mg tablet    Allergies   Allergen Reactions    Clindamycin GI Intolerance     Per Patient    Doxycycline Other (See Comments)     unknown    Influenza Vaccines Other (See Comments)     Flu like symptoms     Nitrous Oxide            Objective     Blood pressure (!) 184/107, pulse 63, height 5' 1.65\" (1.566 m), weight 70.7 kg (155 lb 12.8 oz). Body mass index is 28.82 kg/m².     PHYSICAL EXAM:      General Appearance:   Alert, cooperative, no distress   HEENT:   Normocephalic, atraumatic, anicteric.     Neck:  Supple, symmetrical, trachea midline   Lungs:   Clear to auscultation bilaterally; no rales, rhonchi or wheezing; respirations unlabored    Heart::   Regular rate and rhythm; no murmur, rub, or gallop.   Abdomen:   Soft, non-tender, non-distended; normal bowel sounds; no masses, no organomegaly    Genitalia:   Deferred    Rectal:   Deferred    Extremities:  No cyanosis, clubbing or edema    Pulses:  2+ and symmetric    Skin:  No jaundice, rashes, or lesions    Lymph nodes:  No palpable cervical lymphadenopathy        Lab Results:     Lab Results   Component Value Date    WBC 5.75 06/05/2023    HGB 16.0 06/05/2023    " "HCT 49.1 06/05/2023    MCV 89 06/05/2023     06/05/2023       Lab Results   Component Value Date    K 4.2 06/05/2023     06/05/2023    CO2 30 06/05/2023    BUN 13 06/05/2023    CREATININE 1.07 06/05/2023    GLUF 105 (H) 06/05/2023    CALCIUM 9.2 06/05/2023    CORRECTEDCA 9.5 10/13/2022    AST 30 06/05/2023    ALT 26 06/05/2023    ALKPHOS 59 06/05/2023    EGFR 68 06/05/2023       No results found for: \"INR\", \"PROTIME\"      Radiology Results:   No results found.    Portions of the record may have been created with voice recognition software. Occasional wrong word or \"sound a like\" substitutions may have occurred due to the inherent limitations of voice recognition software. Read the chart carefully and recognize, using context, where substitutions have occurred.        "

## 2023-12-26 NOTE — PATIENT INSTRUCTIONS
Scheduled date of EGD(as of today): 1/9/24  Physician performing EGD: Romulo  Location of EGD: [facility]  Instructions reviewed with patient by: Sheri PATRICK  Clearances:  none

## 2023-12-26 NOTE — PROGRESS NOTES
[facility] - Outpatient Consultation  Bola Sanders 73 y.o. male MRN: [id number]  Encounter: [id number]      PCP: Estefania Minor DO  Referring: No referring provider defined for this encounter.      ASSESSMENT AND PLAN:      1. Roberts's esophagus without dysplasia  2. Chronic GERD  Previous endoscopic diagnosis of Roberts's esophagus in 2019, biopsies from this area are not available for my review  He continues on Nexium 20 mg  We discussed risk factors of Roberts's esophagus including chronic alcohol use, chronic NSAIDs, tobacco use, which I recommend limiting  - EGD; Future, assess for evidence of Roberts's esophagus, continue lowest effective dose of PPI  - esomeprazole (NexIUM) 20 mg capsule; Take 1 capsule (20 mg total) by mouth every other day  Dispense: 90 capsule; Refill: 3    ______________________________________________________________________    CC:  Chief Complaint   Patient presents with    Consult       HPI:      Patient is a 73-year-old male referred for Roberts's esophagus and GERD.  He has acquired hypothyroidism, HTN, bilateral eustachian tube dysfunction with sensorineural hearing loss, osteoarthritis, HLD, BMI 28.  He takes aspirin 81 mg daily.  He is on Nexium 20 mg daily with good control of his symptoms. He does take aleve occasionally, < 3 times/week. He drinks 1-3 drinks/week.     EGD 2019- [facility]ette-endoscopic appearance of BE- bx results not available for review  Colonoscopy Jan/2023-Dr. Clark-2 subcentimeter cecal tubular adenomas, recall 5 years    REVIEW OF SYSTEMS:    CONSTITUTIONAL: Denies any fever, chills, rigors, and weight loss.  HEENT: No earache or tinnitus. Denies hearing loss or visual disturbances.  CARDIOVASCULAR: No chest pain or palpitations.   RESPIRATORY: Denies any cough, hemoptysis, shortness of breath or dyspnea on exertion.  GASTROINTESTINAL: As noted in the History of Present Illness.   GENITOURINARY: No problems with urination.  Denies any hematuria or dysuria.  NEUROLOGIC: No dizziness or vertigo, denies headaches.   MUSCULOSKELETAL: Denies any muscle or joint pain.   SKIN: Denies skin rashes or itching.   ENDOCRINE: Denies excessive thirst. Denies intolerance to heat or cold.  PSYCHOSOCIAL: Denies depression or anxiety. Denies any recent memory loss.       Historical Information   Past Medical History:   Diagnosis Date    Acute non-recurrent maxillary sinusitis 04/15/2019    Allergic     Angina effort     Anxiety     Roberts esophagus     Roberts's esophagus     Bleeding from left ear 2019    Chest pain 2019    Chest pain, unspecified     Cough 2018    GERD (gastroesophageal reflux disease)     Hernia     HL (hearing loss) 10 years ago    Hyperlipidemia     Hypertension     Impacted cerumen of right ear 2019    Macular hole, right eye 2019    Last Assessment & Plan:  Impression:  1. Macular hole, right eye (dx 2018 ~295 microns, now 391 micron) 2. PP OD (10/2018) 3. Cataract OS   Plan: - Second opinion. Lives in [place] with plan for surgery at [facility]. - Advocated getting the surgery as the best way to fix the MH - Follow up as needed    Migraine     Mild depression 2019    Otorrhea, left 2019    SBO (small bowel obstruction) ([facility]) 2019     Past Surgical History:   Procedure Laterality Date    CATARACT EXTRACTION      COLONOSCOPY      EYE SURGERY      HERNIA REPAIR Right      Social History   Social History     Substance and Sexual Activity   Alcohol Use Yes    Alcohol/week: 4.0 standard drinks of alcohol    Types: 2 Glasses of wine, 2 Shots of liquor per week     Social History     Substance and Sexual Activity   Drug Use Never     Social History     Tobacco Use   Smoking Status Former    Current packs/day: 0.00    Average packs/day: 0.3 packs/day for 10.0 years (2.5 ttl pk-yrs)    Types: Cigarettes    Start date: 1968    Quit date: 1978    Years since quittin.0  "  Smokeless Tobacco Never     Family History   Problem Relation Age of Onset    No Known Problems Mother     Heart attack Father         age 83    Heart disease Father     Hearing loss Father     Pancreatic cancer Brother     Breast cancer Paternal Aunt     Heart attack Paternal Uncle     Stroke Paternal Grandmother        Meds/Allergies       Current Outpatient Medications:     albuterol (Ventolin HFA) 90 mcg/act inhaler    aspirin 81 MG tablet    atorvastatin (LIPITOR) 10 mg tablet    esomeprazole (NexIUM) 20 mg capsule    fexofenadine (Allegra Allergy) 60 MG tablet    latanoprost (XALATAN) 0.005 % ophthalmic solution    levothyroxine 50 mcg tablet    methocarbamol (ROBAXIN) 500 mg tablet    rizatriptan (MAXALT) 10 mg tablet    timolol (TIMOPTIC) 0.5 % ophthalmic solution    valsartan (DIOVAN) 160 mg tablet    Allergies   Allergen Reactions    Clindamycin GI Intolerance     Per Patient    Doxycycline Other (See Comments)     unknown    Influenza Vaccines Other (See Comments)     Flu like symptoms     Nitrous Oxide            Objective     Blood pressure (!) 184/107, pulse 63, height 5' 1.65\" (1.566 m), weight 70.7 kg (155 lb 12.8 oz). Body mass index is 28.82 kg/m².     PHYSICAL EXAM:      General Appearance:   Alert, cooperative, no distress   HEENT:   Normocephalic, atraumatic, anicteric.     Neck:  Supple, symmetrical, trachea midline   Lungs:   Clear to auscultation bilaterally; no rales, rhonchi or wheezing; respirations unlabored    Heart::   Regular rate and rhythm; no murmur, rub, or gallop.   Abdomen:   Soft, non-tender, non-distended; normal bowel sounds; no masses, no organomegaly    Genitalia:   Deferred    Rectal:   Deferred    Extremities:  No cyanosis, clubbing or edema    Pulses:  2+ and symmetric    Skin:  No jaundice, rashes, or lesions    Lymph nodes:  No palpable cervical lymphadenopathy        Lab Results:     Lab Results   Component Value Date    WBC 5.75 06/05/2023    HGB 16.0 06/05/2023    " "HCT 49.1 06/05/2023    MCV 89 06/05/2023     06/05/2023       Lab Results   Component Value Date    K 4.2 06/05/2023     06/05/2023    CO2 30 06/05/2023    BUN 13 06/05/2023    CREATININE 1.07 06/05/2023    GLUF 105 (H) 06/05/2023    CALCIUM 9.2 06/05/2023    CORRECTEDCA 9.5 10/13/2022    AST 30 06/05/2023    ALT 26 06/05/2023    ALKPHOS 59 06/05/2023    EGFR 68 06/05/2023       No results found for: \"INR\", \"PROTIME\"      Radiology Results:   No results found.    Portions of the record may have been created with voice recognition software. Occasional wrong word or \"sound a like\" substitutions may have occurred due to the inherent limitations of voice recognition software. Read the chart carefully and recognize, using context, where substitutions have occurred.        "

## 2023-12-28 ENCOUNTER — ANESTHESIA EVENT (OUTPATIENT)
Dept: ANESTHESIOLOGY | Facility: AMBULATORY SURGERY CENTER | Age: 73
End: 2023-12-28

## 2023-12-28 ENCOUNTER — ANESTHESIA (OUTPATIENT)
Dept: ANESTHESIOLOGY | Facility: AMBULATORY SURGERY CENTER | Age: 73
End: 2023-12-28

## 2023-12-28 DIAGNOSIS — E03.9 ACQUIRED HYPOTHYROIDISM: ICD-10-CM

## 2023-12-28 RX ORDER — LEVOTHYROXINE SODIUM 0.05 MG/1
50 TABLET ORAL DAILY
Qty: 90 TABLET | Refills: 0 | Status: SHIPPED | OUTPATIENT
Start: 2023-12-28

## 2023-12-28 NOTE — TELEPHONE ENCOUNTER
Requested medication(s) are due for refill today: Yes  Patient has already received a courtesy refill: No  Other reason request has been forwarded to provider: per protocol    Please review pt comment.

## 2023-12-29 ENCOUNTER — TELEPHONE (OUTPATIENT)
Dept: CARDIOLOGY CLINIC | Facility: CLINIC | Age: 73
End: 2023-12-29

## 2023-12-29 NOTE — TELEPHONE ENCOUNTER
Spoke to patient who reported prolonged chest pain yesterday - happening at rest. Pt reports he feels okay today, chest pain free, BP this morning was 150/81 which is higher than his usual. Pt resumed taking amlodipine today which he had previously stopped due to sleepiness.     Soonest available appt with Dr SOSA is on 2/15, appt provided to patient. Pt advised to proceed to the ED if prolonged chest pain returns, or other concerning symptoms arise.

## 2023-12-29 NOTE — TELEPHONE ENCOUNTER
Pt called in c/o chest pains yesterday and elevated BP today (did not specificy # in msg). I called patient back and left a voicemail, left my direct teams number for a call back. I advised pt that if chest pain persisted, he should proceed to the ED for evaluation. I will try calling the patient back.

## 2024-01-08 DIAGNOSIS — I10 ESSENTIAL HYPERTENSION: Primary | ICD-10-CM

## 2024-01-08 RX ORDER — AMLODIPINE BESYLATE 2.5 MG/1
2.5 TABLET ORAL DAILY
Qty: 90 TABLET | Refills: 1 | Status: SHIPPED | OUTPATIENT
Start: 2024-01-08

## 2024-01-09 ENCOUNTER — ANESTHESIA (OUTPATIENT)
Dept: GASTROENTEROLOGY | Facility: AMBULATORY SURGERY CENTER | Age: 74
End: 2024-01-09

## 2024-01-09 ENCOUNTER — ANESTHESIA EVENT (OUTPATIENT)
Dept: GASTROENTEROLOGY | Facility: AMBULATORY SURGERY CENTER | Age: 74
End: 2024-01-09

## 2024-01-09 ENCOUNTER — HOSPITAL ENCOUNTER (OUTPATIENT)
Dept: GASTROENTEROLOGY | Facility: AMBULATORY SURGERY CENTER | Age: 74
Discharge: HOME | End: 2024-01-09
Attending: INTERNAL MEDICINE
Payer: COMMERCIAL

## 2024-01-09 VITALS
HEIGHT: 61 IN | BODY MASS INDEX: 29.27 KG/M2 | WEIGHT: 155 LBS | OXYGEN SATURATION: 98 % | TEMPERATURE: 97.5 F | DIASTOLIC BLOOD PRESSURE: 58 MMHG | HEART RATE: 60 BPM | SYSTOLIC BLOOD PRESSURE: 102 MMHG | RESPIRATION RATE: 18 BRPM

## 2024-01-09 DIAGNOSIS — K22.70 BARRETT'S ESOPHAGUS WITHOUT DYSPLASIA: ICD-10-CM

## 2024-01-09 DIAGNOSIS — K21.9 CHRONIC GERD: ICD-10-CM

## 2024-01-09 PROCEDURE — 88305 TISSUE EXAM BY PATHOLOGIST: CPT | Performed by: PATHOLOGY

## 2024-01-09 PROCEDURE — 43239 EGD BIOPSY SINGLE/MULTIPLE: CPT | Performed by: INTERNAL MEDICINE

## 2024-01-09 RX ORDER — SODIUM CHLORIDE, SODIUM LACTATE, POTASSIUM CHLORIDE, CALCIUM CHLORIDE 600; 310; 30; 20 MG/100ML; MG/100ML; MG/100ML; MG/100ML
125 INJECTION, SOLUTION INTRAVENOUS CONTINUOUS
Status: DISCONTINUED | OUTPATIENT
Start: 2024-01-09 | End: 2024-01-13 | Stop reason: HOSPADM

## 2024-01-09 RX ORDER — PROPOFOL 10 MG/ML
INJECTION, EMULSION INTRAVENOUS AS NEEDED
Status: DISCONTINUED | OUTPATIENT
Start: 2024-01-09 | End: 2024-01-09

## 2024-01-09 RX ORDER — SODIUM CHLORIDE 9 MG/ML
20 INJECTION, SOLUTION INTRAVENOUS CONTINUOUS
Status: DISCONTINUED | OUTPATIENT
Start: 2024-01-09 | End: 2024-01-13 | Stop reason: HOSPADM

## 2024-01-09 RX ADMIN — PROPOFOL 50 MG: 10 INJECTION, EMULSION INTRAVENOUS at 08:12

## 2024-01-09 RX ADMIN — PROPOFOL 100 MG: 10 INJECTION, EMULSION INTRAVENOUS at 08:08

## 2024-01-09 RX ADMIN — SODIUM CHLORIDE, SODIUM LACTATE, POTASSIUM CHLORIDE, CALCIUM CHLORIDE: 600; 310; 30; 20 INJECTION, SOLUTION INTRAVENOUS at 08:01

## 2024-01-09 RX ADMIN — SODIUM CHLORIDE, SODIUM LACTATE, POTASSIUM CHLORIDE, CALCIUM CHLORIDE: 600; 310; 30; 20 INJECTION, SOLUTION INTRAVENOUS at 08:15

## 2024-01-09 NOTE — ANESTHESIA PREPROCEDURE EVALUATION
Procedure:  EGD    Relevant Problems   CARDIO   (+) Essential hypertension   (+) Mixed hyperlipidemia      ENDO   (+) Acquired hypothyroidism      GI/HEPATIC   (+) Chronic GERD      MUSCULOSKELETAL   (+) Osteoarthrosis, localized, primary, knee, right   (+) Primary localized osteoarthritis of left knee        Physical Exam    Airway    Mallampati score: II  TM Distance: >3 FB  Neck ROM: full     Dental   No notable dental hx     Cardiovascular  Cardiovascular exam normal    Pulmonary  Pulmonary exam normal     Other Findings        Anesthesia Plan  ASA Score- 2     Anesthesia Type- IV sedation with anesthesia with ASA Monitors.         Additional Monitors:     Airway Plan:            Plan Factors-Exercise tolerance (METS): >4 METS.    Chart reviewed.   Existing labs reviewed. Patient summary reviewed.    Patient is not a current smoker.              Induction-     Postoperative Plan-     Informed Consent- Anesthetic plan and risks discussed with patient.  I personally reviewed this patient with the CRNA. Discussed and agreed on the Anesthesia Plan with the CRNA..

## 2024-01-09 NOTE — INTERVAL H&P NOTE
H&P reviewed. After examining the patient I find no changes in the patients condition since the H&P had been written.    Vitals:    01/09/24 0750   BP: (!) 199/91   Pulse: 61   Resp: 18   Temp: 97.5 °F (36.4 °C)   SpO2: 97%

## 2024-01-09 NOTE — ANESTHESIA POSTPROCEDURE EVALUATION
Post-Op Assessment Note    CV Status:  Stable  Pain Score: 0    Pain management: adequate       Mental Status:  Alert and awake   Hydration Status:  Euvolemic   PONV Controlled:  Controlled   Airway Patency:  Patent     Post Op Vitals Reviewed: Yes      Staff: CRNA               BP   102/55   Temp   98   Pulse  61   Resp   16   SpO2   99

## 2024-01-11 PROCEDURE — 88305 TISSUE EXAM BY PATHOLOGIST: CPT | Performed by: PATHOLOGY

## 2024-01-21 DIAGNOSIS — E78.2 MIXED HYPERLIPIDEMIA: ICD-10-CM

## 2024-01-22 RX ORDER — ATORVASTATIN CALCIUM 10 MG/1
10 TABLET, FILM COATED ORAL DAILY
Qty: 90 TABLET | Refills: 3 | Status: SHIPPED | OUTPATIENT
Start: 2024-01-22

## 2024-01-24 ENCOUNTER — OFFICE VISIT (OUTPATIENT)
Dept: GASTROENTEROLOGY | Facility: AMBULARY SURGERY CENTER | Age: 74
End: 2024-01-24
Payer: COMMERCIAL

## 2024-01-24 ENCOUNTER — APPOINTMENT (OUTPATIENT)
Dept: LAB | Age: 74
End: 2024-01-24
Payer: COMMERCIAL

## 2024-01-24 VITALS
HEART RATE: 64 BPM | BODY MASS INDEX: 29.6 KG/M2 | SYSTOLIC BLOOD PRESSURE: 156 MMHG | DIASTOLIC BLOOD PRESSURE: 80 MMHG | WEIGHT: 156.8 LBS | OXYGEN SATURATION: 98 % | HEIGHT: 61 IN

## 2024-01-24 DIAGNOSIS — I10 ESSENTIAL HYPERTENSION: ICD-10-CM

## 2024-01-24 DIAGNOSIS — E78.2 MIXED HYPERLIPIDEMIA: ICD-10-CM

## 2024-01-24 DIAGNOSIS — R10.12 LEFT UPPER QUADRANT ABDOMINAL PAIN: ICD-10-CM

## 2024-01-24 DIAGNOSIS — K22.70 BARRETT'S ESOPHAGUS WITHOUT DYSPLASIA: Primary | ICD-10-CM

## 2024-01-24 DIAGNOSIS — K21.9 CHRONIC GERD: ICD-10-CM

## 2024-01-24 DIAGNOSIS — Z12.5 SCREENING FOR PROSTATE CANCER: ICD-10-CM

## 2024-01-24 DIAGNOSIS — Z80.0 FAMILY HISTORY OF PANCREATIC CANCER: ICD-10-CM

## 2024-01-24 LAB
ALBUMIN SERPL BCP-MCNC: 4.1 G/DL (ref 3.5–5)
ALP SERPL-CCNC: 49 U/L (ref 34–104)
ALT SERPL W P-5'-P-CCNC: 16 U/L (ref 7–52)
ANION GAP SERPL CALCULATED.3IONS-SCNC: 8 MMOL/L
AST SERPL W P-5'-P-CCNC: 26 U/L (ref 13–39)
BILIRUB SERPL-MCNC: 0.63 MG/DL (ref 0.2–1)
BUN SERPL-MCNC: 12 MG/DL (ref 5–25)
CALCIUM SERPL-MCNC: 9.1 MG/DL (ref 8.4–10.2)
CHLORIDE SERPL-SCNC: 104 MMOL/L (ref 96–108)
CHOLEST SERPL-MCNC: 187 MG/DL
CO2 SERPL-SCNC: 28 MMOL/L (ref 21–32)
CREAT SERPL-MCNC: 0.95 MG/DL (ref 0.6–1.3)
ERYTHROCYTE [DISTWIDTH] IN BLOOD BY AUTOMATED COUNT: 12.5 % (ref 11.6–15.1)
GFR SERPL CREATININE-BSD FRML MDRD: 77 ML/MIN/1.73SQ M
GLUCOSE P FAST SERPL-MCNC: 94 MG/DL (ref 65–99)
HCT VFR BLD AUTO: 49.5 % (ref 36.5–49.3)
HDLC SERPL-MCNC: 78 MG/DL
HGB BLD-MCNC: 16.6 G/DL (ref 12–17)
LDLC SERPL CALC-MCNC: 95 MG/DL (ref 0–100)
MCH RBC QN AUTO: 29.9 PG (ref 26.8–34.3)
MCHC RBC AUTO-ENTMCNC: 33.5 G/DL (ref 31.4–37.4)
MCV RBC AUTO: 89 FL (ref 82–98)
PLATELET # BLD AUTO: 236 THOUSANDS/UL (ref 149–390)
PMV BLD AUTO: 10.8 FL (ref 8.9–12.7)
POTASSIUM SERPL-SCNC: 4.2 MMOL/L (ref 3.5–5.3)
PROT SERPL-MCNC: 6.5 G/DL (ref 6.4–8.4)
PSA SERPL-MCNC: 3.62 NG/ML (ref 0–4)
RBC # BLD AUTO: 5.55 MILLION/UL (ref 3.88–5.62)
SODIUM SERPL-SCNC: 140 MMOL/L (ref 135–147)
TRIGL SERPL-MCNC: 68 MG/DL
TSH SERPL DL<=0.05 MIU/L-ACNC: 3.7 UIU/ML (ref 0.45–4.5)
WBC # BLD AUTO: 5.62 THOUSAND/UL (ref 4.31–10.16)

## 2024-01-24 PROCEDURE — G0103 PSA SCREENING: HCPCS

## 2024-01-24 PROCEDURE — 80061 LIPID PANEL: CPT

## 2024-01-24 PROCEDURE — 84443 ASSAY THYROID STIM HORMONE: CPT

## 2024-01-24 PROCEDURE — 3079F DIAST BP 80-89 MM HG: CPT | Performed by: INTERNAL MEDICINE

## 2024-01-24 PROCEDURE — 85027 COMPLETE CBC AUTOMATED: CPT

## 2024-01-24 PROCEDURE — 1159F MED LIST DOCD IN RCRD: CPT | Performed by: INTERNAL MEDICINE

## 2024-01-24 PROCEDURE — 99214 OFFICE O/P EST MOD 30 MIN: CPT | Performed by: INTERNAL MEDICINE

## 2024-01-24 PROCEDURE — 3077F SYST BP >= 140 MM HG: CPT | Performed by: INTERNAL MEDICINE

## 2024-01-24 PROCEDURE — 1160F RVW MEDS BY RX/DR IN RCRD: CPT | Performed by: INTERNAL MEDICINE

## 2024-01-24 PROCEDURE — 80053 COMPREHEN METABOLIC PANEL: CPT

## 2024-01-24 PROCEDURE — 36415 COLL VENOUS BLD VENIPUNCTURE: CPT

## 2024-01-24 NOTE — PROGRESS NOTES
[facility] - Outpatient Follow-up Note  Bola Sanders 73 y.o. male MRN: [id number]  Encounter: [id number]          ASSESSMENT AND PLAN:      1. Roberts's esophagus without dysplasia  2. Chronic GERD  Chronic symptoms > 12 months,   EGD with short segment BE without dysplasia; repeat EGD in 3 years  - esomeprazole (NexIUM) 20 mg capsule; Take 1 capsule (20 mg total) by mouth daily in the early morning  Dispense: 90 capsule; Refill: 3    3. Left upper quadrant abdominal pain  4. Family history of pancreatic cancer  Does have history of atypical chest pain, follows with cardiology  Additionally family history of pancreatic cancer and previous history of small bowel obstruction  - CT abdomen pelvis w contrast; Future, r/o obstruction, malignancy or other    ______________________________________________________________________    SUBJECTIVE:      Patient is a 73-year-old male who sees me in office visit follow-up for abdominal pain, he also has short segment Roberts's esophagus without dysplasia, and GERD on PPI.  He complains of 2 weeks of left upper quadrant abdominal pain which began shortly after the endoscopy.  Symptoms are 3 out of 10 in severity.  He denies any nausea, vomiting, acid reflux.  He denies any association with eating or movement.  He does note the symptoms occur while sitting.  He has regular bowel movements, that occur 2 times per day.    In 2019 he was admitted for epigastric and periumbilical abdominal pain, with CT imaging revealing small bowel obstruction in the setting of previous right groin hernia repair.  Subsequent CT enterography performed in the similar timeframe did not show any small intestinal abnormalities.  His last colonoscopy was in 2023.    Abdominal Pain  The problem has been resolved. Associated symptoms include arthralgias, frequency and myalgias. Pertinent negatives include no anorexia, constipation, diarrhea, dysuria, fever, headaches,  "hematochezia, hematuria, melena, nausea, vomiting or weight loss.     Colonoscopy Jan/2023-Dr. Clark-2 subcentimeter cecal tubular adenomas, recall 5 years   CT enterography 2019- previously seen SBO resolved; images reviewed on PACS    I personally reviewed external procedure reports.     Objective     Blood pressure 156/80, pulse 64, height 5' 1\" (1.549 m), weight 71.1 kg (156 lb 12.8 oz), SpO2 98%. Body mass index is 29.63 kg/m².      PHYSICAL EXAM:      General Appearance:   Alert, cooperative, no distress   HEENT:   Normocephalic, atraumatic, anicteric.     Neck:  Supple, symmetrical, trachea midline   Lungs:   Equal chest rise and unlabored breathing, normal effort, no coughing.    Heart::   No visualized JVD.   Abdomen:   Soft, non-tender, non-distended; normal bowel sounds; no masses, no organomegaly    Rectal:   Deferred    Extremities:  No cyanosis, clubbing or edema    Pulses:  2+ and symmetric    Skin:  No jaundice, rashes, or lesions      Lab Results:   Lab Results   Component Value Date    WBC 5.62 01/24/2024    HGB 16.6 01/24/2024    HCT 49.5 (H) 01/24/2024    MCV 89 01/24/2024     01/24/2024       Lab Results   Component Value Date    SODIUM 135 06/05/2023    K 4.2 06/05/2023     06/05/2023    CO2 30 06/05/2023    AGAP 0 (L) 06/05/2023    BUN 13 06/05/2023    CREATININE 1.07 06/05/2023    GLUC 111 03/24/2019    GLUF 105 (H) 06/05/2023    CALCIUM 9.2 06/05/2023    AST 30 06/05/2023    ALT 26 06/05/2023    ALKPHOS 59 06/05/2023    TP 6.9 06/05/2023    TBILI 1.12 (H) 06/05/2023    EGFR 68 06/05/2023     I personally reviewed relevant lab results    Radiology Results:     I personally reviewed relevant images in PACS.  "

## 2024-02-07 ENCOUNTER — HOSPITAL ENCOUNTER (OUTPATIENT)
Dept: CT IMAGING | Facility: HOSPITAL | Age: 74
Discharge: HOME | End: 2024-02-07
Attending: INTERNAL MEDICINE
Payer: COMMERCIAL

## 2024-02-07 DIAGNOSIS — Z80.0 FAMILY HISTORY OF PANCREATIC CANCER: ICD-10-CM

## 2024-02-07 DIAGNOSIS — R10.12 LEFT UPPER QUADRANT ABDOMINAL PAIN: ICD-10-CM

## 2024-02-07 PROCEDURE — 74177 CT ABD & PELVIS W/CONTRAST: CPT

## 2024-02-07 PROCEDURE — G1004 CDSM NDSC: HCPCS

## 2024-02-07 RX ADMIN — IOHEXOL 100 ML: 350 INJECTION, SOLUTION INTRAVENOUS at 14:31

## 2024-02-14 NOTE — PROGRESS NOTES
"Cardiology Clinic Note    Bola Sanders 73 y.o. male   MRN: [id number]  Encounter: [id number]        Assessment / Plan:    # HTN  Reports white coat HTN (so often higher in the office).   Valsartan 160mg daily  Norvasc 2.5mg daily  BP still above goal  Plan:  increase norvasc to 5mg daily    # HLD  Lipitor 10mg  Last  --> 95    # chest pain  Per prior cardiologist note:  \"Atypical chest pain most probably musculoskeletal aggravated with an element of anxiety.  No ischemic component was identified on prior evaluations\"  Nuclear stress in 2021 - normal perfusion  Coronary vasospasm would be on the differential.  started norvasc last visit.   Plan:  now with exertional chest pain that is new.   Check CT coronary angio - eval for CAD and for anomalous coronary.    # bradycardia  Reports HR as low as 47 bpm at times per apple watch (when resting)  He has a narrow QRS on EKG.  Can get HR to 160's with exercise.  No symptoms associated with low HR.  Noted on timolol eye drops  Continue to monitor.     # Fam hx of CAD  He is on aspirin 81mg for this  He is on statin    # migraines  Very rare  On triptan  (he will check if chest pain sx's correlate with taking this medication)      Today's Plan Summary:  See above assessment/plan for full details of today's plan.  Briefly,     Increase Norvasc to 5 mg daily  Obtain CT coronary angiogram            Reason For Visit / Chief Complaint:  F/u -  hypertension, hyperlipidemia, chest pain    HPI:   Bola Sanders is a 73 y.o.  male with history as noted in the problem list and further detailed in the above assessment and plan.    Initial:   Nov 2023  Here to establish care with a new cardiologist  The patient has a history of HTN, HLD, and atypical chest pain with prior negative nuclear stress testing.  Last saw Dr Recio in April 2023 -  no med changes at that time.  Today -  reports the chest pain is sharp.   Absolutely random in terms of when it occurs.   Last event was when " walking in from garage.   Sat down and it went away.  Typically lasts about 15 minutes (only severe for a few seconds).    Occurs about 1 episode per month on average.   It is not exertional.  He is a serious road cyclist.  Never gets the chest pain with biking.  Can get HR up to 160 with exercise - no sx's with this.    Mild SOB since had covid - feels like lung capacity is a bit lower than previously.  Retired .  [employer].   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  Serious road cyclist.  Also likes mountain biking.   .  2 children.  Former smoker (quit in 1970s).    Interval:  Plan at initial visit --> start norvasc 2.5mg daily.    Labs 1-24-24 reviewed.   CMP normal.  LDL 95 (improved).    Called in reporting blood pressure was lower on amlodipine but not statistically significant.  He felt the medication was causing drowsiness and anxiousness.  Wanted to stop.    Called in and had another episode of chest pain.  This occurred at rest.  His blood pressure was high and he restarted the amlodipine.    Today - reports at home - systolic BP is trending down.    Had some exertional chest pain when biking.  Substernal / left sided chest pain - lasted only minutes.  This is new - previously has only had rest chest pain.          Cardiac Imaging personally reviewed:  EKG 11-2-23  NSR at 60 bpm.   Holter or event monitor    Echo 2021  EF 60%.    JESSICA    Cardiac MRI    Stress testing Nuclear 2021 -  normal perfusion   [facility]    CP[facility]            Patient Active Problem List    Diagnosis Date Noted   • Family history of pancreatic cancer 05/25/2023   • History of COVID-19 01/23/2023   • Chronic pain of both knees 02/16/2021   • Medicare annual wellness visit, subsequent 11/17/2020   • Bilateral primary osteoarthritis of knee 08/13/2020   • Screening for prostate cancer 11/07/2019   • ETD (Eustachian tube dysfunction), bilateral  08/21/2019   • Sensorineural hearing loss (SNHL), bilateral 08/21/2019   • Encounter for Medicare annual wellness exam 10/23/2018   • Roberts's esophagus 02/13/2018   • Chronic GERD 08/28/2017   • Acquired hypothyroidism 11/17/2016   • Osteoarthrosis, localized, primary, knee, right 10/13/2015   • Essential hypertension 10/02/2015   • Mixed hyperlipidemia 10/02/2015   • Primary localized osteoarthritis of left knee 08/27/2015       Past Medical History:   Diagnosis Date   • Acute non-recurrent maxillary sinusitis 04/15/2019   • Allergic    • Angina effort    • Anxiety    • Roberts esophagus 2000   • Robrets's esophagus    • Bleeding from left ear 04/26/2019   • Chest pain 11/07/2019   • Chest pain, unspecified    • Colon polyp    • Cough 02/13/2018   • GERD (gastroesophageal reflux disease)    • Glaucoma    • Hernia 2014   • HL (hearing loss) 10 years ago   • Hyperlipidemia    • Hypertension    • Impacted cerumen of right ear 04/25/2019   • Macular hole, right eye 01/28/2019    Last Assessment & Plan:  Impression:  1. Macular hole, right eye (dx 11/2018 ~295 microns, now 391 micron) 2. PP OD (10/2018) 3. Cataract OS   Plan: - Second opinion. Lives in [place] with plan for surgery at [facility]. - Advocated getting the surgery as the best way to fix the MH - Follow up as needed   • Migraine    • Mild depression 11/26/2019   • Otorrhea, left 04/25/2019   • SBO (small bowel obstruction) ([facility]) 03/23/2019         Allergies   Allergen Reactions   • Clindamycin GI Intolerance     Per Patient   • Doxycycline Other (See Comments)     unknown   • Influenza Vaccines Other (See Comments)     Flu like symptoms    • Nitrous Oxide        Current Outpatient Medications   Medication Instructions   • amLODIPine (NORVASC) 5 mg, Oral, Daily   • aspirin 81 MG tablet Oral, Daily   • atorvastatin (LIPITOR) 10 mg, Oral, Daily   • esomeprazole (NEXIUM) 20 mg, Oral, Daily (early morning)   • latanoprost (XALATAN) 0.005 % ophthalmic solution 1 drop,  Both Eyes, Daily at bedtime   • levothyroxine 50 mcg, Oral, Daily, Take on an empty stomach   • methocarbamol (ROBAXIN) 500 mg, Oral, 4 times daily, As needed   • metoprolol tartrate (LOPRESSOR) 25 mg, Oral, Once as needed   • rizatriptan (MAXALT) 10 mg tablet TAKE 1 TABLET BY MOUTH ONCE AS NEEDED FOR MIGRAINE FOR UP TO ONE DOSE. MAY REPEAT IN TWO HOURS IF NEEDED   • timolol (TIMOPTIC) 0.5 % ophthalmic solution 1 drop, Both Eyes, Daily   • valsartan (DIOVAN) 160 mg, Oral, Daily       Social History     Socioeconomic History   • Marital status: /Civil Union     Spouse name: Not on file   • Number of children: Not on file   • Years of education: Not on file   • Highest education level: Not on file   Occupational History   • Not on file   Tobacco Use   • Smoking status: Former     Current packs/day: 0.00     Average packs/day: 0.3 packs/day for 10.0 years (2.5 ttl pk-yrs)     Types: Cigarettes     Start date: 1968     Quit date: 1978     Years since quittin.1   • Smokeless tobacco: Never   Vaping Use   • Vaping status: Never Used   Substance and Sexual Activity   • Alcohol use: Yes     Alcohol/week: 4.0 standard drinks of alcohol     Types: 2 Glasses of wine, 2 Shots of liquor per week   • Drug use: Never   • Sexual activity: Yes     Partners: Female     Birth control/protection: None   Other Topics Concern   • Not on file   Social History Narrative   • Not on file     Social Determinants of Health     Financial Resource Strain: Low Risk  (2022)    Overall Financial Resource Strain (CARDIA)    • Difficulty of Paying Living Expenses: Not hard at all   Food Insecurity: Not on file   Transportation Needs: No Transportation Needs (2022)    PRAPARE - Transportation    • Lack of Transportation (Medical): No    • Lack of Transportation (Non-Medical): No   Physical Activity: Not on file   Stress: Not on file   Social Connections: Not on file   Intimate Partner Violence: Not on file   Housing  Stability: Not on file       Family History   Problem Relation Age of Onset   • No Known Problems Mother    • Heart attack Father         age 83   • Heart disease Father    • Hearing loss Father    • Pancreatic cancer Brother    • Stroke Paternal Grandmother    • Breast cancer Paternal Aunt    • Esophageal cancer Paternal Aunt    • Heart attack Paternal Uncle        Physical Exam:  Blood pressure 152/96, pulse 64, weight 70.4 kg (155 lb 1.6 oz), SpO2 98%.  Body mass index is 29.31 kg/m².  Wt Readings from Last 3 Encounters:   02/15/24 70.4 kg (155 lb 1.6 oz)   01/24/24 71.1 kg (156 lb 12.8 oz)   01/09/24 70.3 kg (155 lb)     Physical Exam  Vitals reviewed.   Constitutional:       General: He is not in acute distress.     Appearance: He is not toxic-appearing.   Neck:      Comments: No JVD  Cardiovascular:      Rate and Rhythm: Normal rate and regular rhythm.      Heart sounds: No murmur heard.     No friction rub. No gallop.   Pulmonary:      Breath sounds: Normal breath sounds. No wheezing, rhonchi or rales.   Musculoskeletal:      Right lower leg: No edema.      Left lower leg: No edema.   Neurological:      Mental Status: He is alert.         Labs & Results:  Lab Results   Component Value Date    SODIUM 140 01/24/2024    K 4.2 01/24/2024     01/24/2024    CO2 28 01/24/2024    BUN 12 01/24/2024    CREATININE 0.95 01/24/2024    GLUC 111 03/24/2019    CALCIUM 9.1 01/24/2024         Thank you for the opportunity to participate in the care of this patient.    Jesus Stephens MD, [facility]  Advanced Heart Failure and Transplant Cardiologist  Director of Cardio-Oncology  [facility]

## 2024-02-15 ENCOUNTER — OFFICE VISIT (OUTPATIENT)
Dept: CARDIOLOGY CLINIC | Facility: CLINIC | Age: 74
End: 2024-02-15
Payer: COMMERCIAL

## 2024-02-15 VITALS
HEART RATE: 64 BPM | DIASTOLIC BLOOD PRESSURE: 96 MMHG | SYSTOLIC BLOOD PRESSURE: 152 MMHG | OXYGEN SATURATION: 98 % | WEIGHT: 155.1 LBS | BODY MASS INDEX: 29.31 KG/M2

## 2024-02-15 DIAGNOSIS — R00.1 BRADYCARDIA: ICD-10-CM

## 2024-02-15 DIAGNOSIS — R07.9 CHEST PAIN, UNSPECIFIED TYPE: Primary | ICD-10-CM

## 2024-02-15 DIAGNOSIS — E78.2 MIXED HYPERLIPIDEMIA: ICD-10-CM

## 2024-02-15 DIAGNOSIS — I10 ESSENTIAL HYPERTENSION: ICD-10-CM

## 2024-02-15 PROCEDURE — 99214 OFFICE O/P EST MOD 30 MIN: CPT | Performed by: INTERNAL MEDICINE

## 2024-02-15 RX ORDER — AMLODIPINE BESYLATE 5 MG/1
5 TABLET ORAL DAILY
Qty: 90 TABLET | Refills: 3 | Status: SHIPPED | OUTPATIENT
Start: 2024-02-15

## 2024-02-15 NOTE — PATIENT INSTRUCTIONS
For blood pressure I recommend increasing amlodipine to 5 mg daily    For chest pain I recommend a cardiac CT of the coronary arteries.

## 2024-02-21 PROBLEM — Z00.00 MEDICARE ANNUAL WELLNESS VISIT, SUBSEQUENT: Status: RESOLVED | Noted: 2020-11-17 | Resolved: 2024-02-21

## 2024-02-21 PROBLEM — Z12.5 SCREENING FOR PROSTATE CANCER: Status: RESOLVED | Noted: 2019-11-07 | Resolved: 2024-02-21

## 2024-02-21 PROBLEM — Z00.00 ENCOUNTER FOR MEDICARE ANNUAL WELLNESS EXAM: Status: RESOLVED | Noted: 2018-10-23 | Resolved: 2024-02-21

## 2024-03-15 ENCOUNTER — HOSPITAL ENCOUNTER (OUTPATIENT)
Dept: CT IMAGING | Facility: HOSPITAL | Age: 74
End: 2024-03-15
Attending: INTERNAL MEDICINE
Payer: COMMERCIAL

## 2024-03-15 VITALS — HEART RATE: 76 BPM | SYSTOLIC BLOOD PRESSURE: 123 MMHG | DIASTOLIC BLOOD PRESSURE: 63 MMHG

## 2024-03-15 DIAGNOSIS — R07.9 CHEST PAIN, UNSPECIFIED TYPE: ICD-10-CM

## 2024-03-15 PROCEDURE — G1004 CDSM NDSC: HCPCS

## 2024-03-15 PROCEDURE — 75574 CT ANGIO HRT W/3D IMAGE: CPT

## 2024-03-15 RX ORDER — NITROGLYCERIN 0.4 MG/1
0.4 TABLET SUBLINGUAL
Status: DISCONTINUED | OUTPATIENT
Start: 2024-03-15 | End: 2024-03-19 | Stop reason: HOSPADM

## 2024-03-15 RX ORDER — METOPROLOL TARTRATE 1 MG/ML
5 INJECTION, SOLUTION INTRAVENOUS
Status: DISCONTINUED | OUTPATIENT
Start: 2024-03-15 | End: 2024-03-19 | Stop reason: HOSPADM

## 2024-03-15 RX ADMIN — NITROGLYCERIN 0.4 MG: 0.4 TABLET SUBLINGUAL at 12:15

## 2024-03-15 RX ADMIN — IOHEXOL 80 ML: 350 INJECTION, SOLUTION INTRAVENOUS at 12:49

## 2024-03-15 NOTE — RESULT ENCOUNTER NOTE
CT coronary angiogram - 03/15/24     Small amount of calcified plaque in left main.    No significant coronary stenosis.  Coronary calcium score - 2  Superficial myocardial bridging of a 2 cm segment of the mid LAD  Small hiatal hernia.

## 2024-03-15 NOTE — NURSING NOTE
Patient arrived for cardiac CT angiography. Allergies and medications reviewed. Patient educated on CT test; medications given. Patient took beta blocker as instructed by cardiologist prior to test. Patient was also given 1 dose of nitroglycerin 0.4 mg tablet sublingual. Blood pressure was measured frequently. Access chart flowsheet for vitals. Tolerated test well. Educated to increase fluid intake for hydration today. Vitals stable and patient asymptomatic upon leaving department.

## 2024-04-16 ENCOUNTER — OFFICE VISIT (OUTPATIENT)
Dept: FAMILY MEDICINE CLINIC | Facility: CLINIC | Age: 74
End: 2024-04-16
Payer: COMMERCIAL

## 2024-04-16 VITALS
HEART RATE: 71 BPM | HEIGHT: 61 IN | WEIGHT: 154.2 LBS | DIASTOLIC BLOOD PRESSURE: 86 MMHG | SYSTOLIC BLOOD PRESSURE: 144 MMHG | BODY MASS INDEX: 29.11 KG/M2 | TEMPERATURE: 97.4 F | RESPIRATION RATE: 18 BRPM | OXYGEN SATURATION: 98 %

## 2024-04-16 DIAGNOSIS — E03.9 ACQUIRED HYPOTHYROIDISM: Primary | ICD-10-CM

## 2024-04-16 DIAGNOSIS — I10 ESSENTIAL HYPERTENSION: ICD-10-CM

## 2024-04-16 DIAGNOSIS — Z00.00 MEDICARE ANNUAL WELLNESS VISIT, SUBSEQUENT: ICD-10-CM

## 2024-04-16 DIAGNOSIS — E78.2 MIXED HYPERLIPIDEMIA: ICD-10-CM

## 2024-04-16 PROCEDURE — G0439 PPPS, SUBSEQ VISIT: HCPCS | Performed by: FAMILY MEDICINE

## 2024-04-16 NOTE — PATIENT INSTRUCTIONS
Medicare Preventive Visit Patient Instructions  Thank you for completing your Welcome to Medicare Visit or Medicare Annual Wellness Visit today. Your next wellness visit will be due in one year (4/17/2025).  The screening/preventive services that you may require over the next 5-10 years are detailed below. Some tests may not apply to you based off risk factors and/or age. Screening tests ordered at today's visit but not completed yet may show as past due. Also, please note that scanned in results may not display below.  Preventive Screenings:  Service Recommendations Previous Testing/Comments   Colorectal Cancer Screening  Colonoscopy    Fecal Occult Blood Test (FOBT)/Fecal Immunochemical Test (FIT)  Fecal DNA/Cologuard Test  Flexible Sigmoidoscopy Age: 45-75 years old   Colonoscopy: every 10 years (May be performed more frequently if at higher risk)  OR  FOBT/FIT: every 1 year  OR  Cologuard: every 3 years  OR  Sigmoidoscopy: every 5 years  Screening may be recommended earlier than age 45 if at higher risk for colorectal cancer. Also, an individualized decision between you and your healthcare provider will decide whether screening between the ages of 76-85 would be appropriate. Colonoscopy: 01/16/2023  FOBT/FIT: Not on file  Cologuard: Not on file  Sigmoidoscopy: Not on file    Screening Current     Prostate Cancer Screening Individualized decision between patient and health care provider in men between ages of 55-69   Medicare will cover every 12 months beginning on the day after your 50th birthday PSA: 3.62 ng/mL     Screening Not Indicated     Hepatitis C Screening Once for adults born between 1945 and 1965  More frequently in patients at high risk for Hepatitis C Hep C Antibody: 09/13/2017    Screening Current   Diabetes Screening 1-2 times per year if you're at risk for diabetes or have pre-diabetes Fasting glucose: 94 mg/dL (1/24/2024)  A1C: No results in last 5 years (No results in last 5 years)  Screening  Current   Cholesterol Screening Once every 5 years if you don't have a lipid disorder. May order more often based on risk factors. Lipid panel: 01/24/2024  Screening Not Indicated  History Lipid Disorder      Other Preventive Screenings Covered by Medicare:  Abdominal Aortic Aneurysm (AAA) Screening: covered once if your at risk. You're considered to be at risk if you have a family history of AAA or a male between the age of 65-75 who smoking at least 100 cigarettes in your lifetime.  Lung Cancer Screening: covers low dose CT scan once per year if you meet all of the following conditions: (1) Age 55-77; (2) No signs or symptoms of lung cancer; (3) Current smoker or have quit smoking within the last 15 years; (4) You have a tobacco smoking history of at least 20 pack years (packs per day x number of years you smoked); (5) You get a written order from a healthcare provider.  Glaucoma Screening: covered annually if you're considered high risk: (1) You have diabetes OR (2) Family history of glaucoma OR (3)  aged 50 and older OR (4)  American aged 65 and older  Osteoporosis Screening: covered every 2 years if you meet one of the following conditions: (1) Have a vertebral abnormality; (2) On glucocorticoid therapy for more than 3 months; (3) Have primary hyperparathyroidism; (4) On osteoporosis medications and need to assess response to drug therapy.  HIV Screening: covered annually if you're between the age of 15-65. Also covered annually if you are younger than 15 and older than 65 with risk factors for HIV infection. For pregnant patients, it is covered up to 3 times per pregnancy.    Immunizations:  Immunization Recommendations   Influenza Vaccine Annual influenza vaccination during flu season is recommended for all persons aged >= 6 months who do not have contraindications   Pneumococcal Vaccine   * Pneumococcal conjugate vaccine = PCV13 (Prevnar 13), PCV15 (Vaxneuvance), PCV20 (Prevnar 20)  *  Pneumococcal polysaccharide vaccine = PPSV23 (Pneumovax) Adults 19-63 yo with certain risk factors or if 65+ yo  If never received any pneumonia vaccine: recommend Prevnar 20 (PCV20)  Give PCV20 if previously received 1 dose of PCV13 or PPSV23   Hepatitis B Vaccine 3 dose series if at intermediate or high risk (ex: diabetes, end stage renal disease, liver disease)   Respiratory syncytial virus (RSV) Vaccine - COVERED BY MEDICARE PART D  * RSVPreF3 (Arexvy) CDC recommends that adults 60 years of age and older may receive a single dose of RSV vaccine using shared clinical decision-making (SCDM)   Tetanus (Td) Vaccine - COST NOT COVERED BY MEDICARE PART B Following completion of primary series, a booster dose should be given every 10 years to maintain immunity against tetanus. Td may also be given as tetanus wound prophylaxis.   Tdap Vaccine - COST NOT COVERED BY MEDICARE PART B Recommended at least once for all adults. For pregnant patients, recommended with each pregnancy.   Shingles Vaccine (Shingrix) - COST NOT COVERED BY MEDICARE PART B  2 shot series recommended in those 19 years and older who have or will have weakened immune systems or those 50 years and older     Health Maintenance Due:      Topic Date Due   • Colorectal Cancer Screening  01/16/2028   • Hepatitis C Screening  Completed     Immunizations Due:      Topic Date Due   • COVID-19 Vaccine (5 - 2023-24 season) 09/01/2023     Advance Directives   What are advance directives?  Advance directives are legal documents that state your wishes and plans for medical care. These plans are made ahead of time in case you lose your ability to make decisions for yourself. Advance directives can apply to any medical decision, such as the treatments you want, and if you want to donate organs.   What are the types of advance directives?  There are many types of advance directives, and each state has rules about how to use them. You may choose a combination of any of  the following:  Living will:  This is a written record of the treatment you want. You can also choose which treatments you do not want, which to limit, and which to stop at a certain time. This includes surgery, medicine, IV fluid, and tube feedings.   Durable power of  for healthcare (DPAHC):  This is a written record that states who you want to make healthcare choices for you when you are unable to make them for yourself. This person, called a proxy, is usually a family member or a friend. You may choose more than 1 proxy.  Do not resuscitate (DNR) order:  A DNR order is used in case your heart stops beating or you stop breathing. It is a request not to have certain forms of treatment, such as CPR. A DNR order may be included in other types of advance directives.  Medical directive:  This covers the care that you want if you are in a coma, near death, or unable to make decisions for yourself. You can list the treatments you want for each condition. Treatment may include pain medicine, surgery, blood transfusions, dialysis, IV or tube feedings, and a ventilator (breathing machine).  Values history:  This document has questions about your views, beliefs, and how you feel and think about life. This information can help others choose the care that you would choose.  Why are advance directives important?  An advance directive helps you control your care. Although spoken wishes may be used, it is better to have your wishes written down. Spoken wishes can be misunderstood, or not followed. Treatments may be given even if you do not want them. An advance directive may make it easier for your family to make difficult choices about your care.   Weight Management   Why it is important to manage your weight:  Being overweight increases your risk of health conditions such as heart disease, high blood pressure, type 2 diabetes, and certain types of cancer. It can also increase your risk for osteoarthritis, sleep apnea,  and other respiratory problems. Aim for a slow, steady weight loss. Even a small amount of weight loss can lower your risk of health problems.  How to lose weight safely:  A safe and healthy way to lose weight is to eat fewer calories and get regular exercise. You can lose up about 1 pound a week by decreasing the number of calories you eat by 500 calories each day.   Healthy meal plan for weight management:  A healthy meal plan includes a variety of foods, contains fewer calories, and helps you stay healthy. A healthy meal plan includes the following:  Eat whole-grain foods more often.  A healthy meal plan should contain fiber. Fiber is the part of grains, fruits, and vegetables that is not broken down by your body. Whole-grain foods are healthy and provide extra fiber in your diet. Some examples of whole-grain foods are whole-wheat breads and pastas, oatmeal, brown rice, and bulgur.  Eat a variety of vegetables every day.  Include dark, leafy greens such as spinach, kale, fabien greens, and mustard greens. Eat yellow and orange vegetables such as carrots, sweet potatoes, and winter squash.   Eat a variety of fruits every day.  Choose fresh or canned fruit (canned in its own juice or light syrup) instead of juice. Fruit juice has very little or no fiber.  Eat low-fat dairy foods.  Drink fat-free (skim) milk or 1% milk. Eat fat-free yogurt and low-fat cottage cheese. Try low-fat cheeses such as mozzarella and other reduced-fat cheeses.  Choose meat and other protein foods that are low in fat.  Choose beans or other legumes such as split peas or lentils. Choose fish, skinless poultry (chicken or turkey), or lean cuts of red meat (beef or pork). Before you cook meat or poultry, cut off any visible fat.   Use less fat and oil.  Try baking foods instead of frying them. Add less fat, such as margarine, sour cream, regular salad dressing and mayonnaise to foods. Eat fewer high-fat foods. Some examples of high-fat foods  include french fries, doughnuts, ice cream, and cakes.  Eat fewer sweets.  Limit foods and drinks that are high in sugar. This includes candy, cookies, regular soda, and sweetened drinks.  Exercise:  Exercise at least 30 minutes per day on most days of the week. Some examples of exercise include walking, biking, dancing, and swimming. You can also fit in more physical activity by taking the stairs instead of the elevator or parking farther away from stores. Ask your healthcare provider about the best exercise plan for you.      © Copyright [employer] 2018 Information is for End User's use only and may not be sold, redistributed or otherwise used for commercial purposes. All illustrations and images included in CareNotes® are the copyrighted property of [employer] or [employer]

## 2024-04-16 NOTE — PROGRESS NOTES
Assessment and Plan:     Problem List Items Addressed This Visit     Acquired hypothyroidism - Primary    Relevant Orders    TSH, 3rd generation with Free T4 reflex    Essential hypertension    Relevant Orders    CBC and differential    Comprehensive metabolic panel    Mixed hyperlipidemia    Relevant Orders    Lipid Panel with Direct LDL reflex    Medicare annual wellness visit, subsequent     Will check about second Hep A            Depression Screening and Follow-up Plan: Patient was screened for depression during today's encounter. They screened negative with a PHQ-2 score of 1.      Preventive health issues were discussed with patient, and age appropriate screening tests were ordered as noted in patient's After Visit Summary.  Personalized health advice and appropriate referrals for health education or preventive services given if needed, as noted in patient's After Visit Summary.     History of Present Illness:     Patient presents for a Medicare Wellness Visit    Here for awv  Bp variable  Seen by cardiology- CTA was great  Exercising with biking many miles  Becoming more sensitive  Feeling like he is urinating more  Getting up twice at night  Drinking 3 cup of coffee and tea in afternoon  Water in between    Hypertension  This is a chronic problem. The current episode started more than 1 year ago. The problem is unchanged. The problem is controlled. There are no associated agents to hypertension. Past treatments include beta blockers and calcium channel blockers. The current treatment provides moderate improvement. There are no compliance problems.       Patient Care Team:  Estefania Minor DO as PCP - General (Family Medicine)  Estefania Minor DO as PCP - PCP-[facility] ([facility])  Alejandro Sandoval MD (Inactive)  Reno Clark MD (Colon and Rectal Surgery)     Review of Systems:     Review of Systems   Constitutional: Negative.    HENT: Negative.     Eyes: Negative.    Respiratory: Negative.      Cardiovascular: Negative.    Gastrointestinal: Negative.    Endocrine: Negative.    Genitourinary:  Positive for frequency.   Musculoskeletal: Negative.    Allergic/Immunologic: Negative.    Neurological: Negative.    Hematological: Negative.    Psychiatric/Behavioral: Negative.          Problem List:     Patient Active Problem List   Diagnosis   • Roberts's esophagus   • Acquired hypothyroidism   • Chronic GERD   • Essential hypertension   • Mixed hyperlipidemia   • ETD (Eustachian tube dysfunction), bilateral   • Sensorineural hearing loss (SNHL), bilateral   • Osteoarthrosis, localized, primary, knee, right   • Primary localized osteoarthritis of left knee   • Bilateral primary osteoarthritis of knee   • Medicare annual wellness visit, subsequent   • Chronic pain of both knees   • History of COVID-19   • Family history of pancreatic cancer      Past Medical and Surgical History:     Past Medical History:   Diagnosis Date   • Acute non-recurrent maxillary sinusitis 04/15/2019   • Allergic    • Angina effort    • Anxiety    • Roberts esophagus 2000   • Roberts's esophagus    • Bleeding from left ear 04/26/2019   • Chest pain 11/07/2019   • Chest pain, unspecified    • Colon polyp    • Cough 02/13/2018   • GERD (gastroesophageal reflux disease)    • Glaucoma    • Hernia 2014   • HL (hearing loss) 10 years ago   • Hyperlipidemia    • Hypertension    • Impacted cerumen of right ear 04/25/2019   • Macular hole, right eye 01/28/2019    Last Assessment & Plan:  Impression:  1. Macular hole, right eye (dx 11/2018 ~295 microns, now 391 micron) 2. PP OD (10/2018) 3. Cataract OS   Plan: - Second opinion. Lives in [place] with plan for surgery at [facility]. - Advocated getting the surgery as the best way to fix the MH - Follow up as needed   • Migraine    • Mild depression 11/26/2019   • Otorrhea, left 04/25/2019   • SBO (small bowel obstruction) ([facility]) 03/23/2019     Past Surgical History:   Procedure Laterality Date   • CATARACT  EXTRACTION     • COLONOSCOPY     • EYE SURGERY     • HERNIA REPAIR Right    • UPPER GASTROINTESTINAL ENDOSCOPY        Family History:     Family History   Problem Relation Age of Onset   • No Known Problems Mother    • Heart attack Father         age 83   • Heart disease Father    • Hearing loss Father    • Pancreatic cancer Brother    • Stroke Paternal Grandmother    • Breast cancer Paternal Aunt    • Esophageal cancer Paternal Aunt    • Heart attack Paternal Uncle       Social History:     Social History     Socioeconomic History   • Marital status: /Civil Union     Spouse name: None   • Number of children: None   • Years of education: None   • Highest education level: None   Occupational History   • None   Tobacco Use   • Smoking status: Former     Current packs/day: 0.00     Average packs/day: 0.3 packs/day for 10.0 years (2.5 ttl pk-yrs)     Types: Cigarettes     Start date: 1968     Quit date: 1978     Years since quittin.3     Passive exposure: Never   • Smokeless tobacco: Never   Vaping Use   • Vaping status: Never Used   Substance and Sexual Activity   • Alcohol use: Yes     Alcohol/week: 4.0 standard drinks of alcohol     Types: 2 Glasses of wine, 2 Shots of liquor per week   • Drug use: Never   • Sexual activity: Yes     Partners: Female     Birth control/protection: None   Other Topics Concern   • None   Social History Narrative   • None     Social Determinants of Health     Financial Resource Strain: Low Risk  (2022)    Overall Financial Resource Strain (CARDIA)    • Difficulty of Paying Living Expenses: Not hard at all   Food Insecurity: No Food Insecurity (2024)    Hunger Vital Sign    • Worried About Running Out of Food in the Last Year: Never true    • Ran Out of Food in the Last Year: Never true   Transportation Needs: No Transportation Needs (2024)    PRAPARE - Transportation    • Lack of Transportation (Medical): No    • Lack of Transportation  (Non-Medical): No   Physical Activity: Not on file   Stress: Not on file   Social Connections: Not on file   Intimate Partner Violence: Not on file   Housing Stability: Low Risk  (4/16/2024)    Housing Stability Vital Sign    • Unable to Pay for Housing in the Last Year: No    • Number of Places Lived in the Last Year: 1    • Unstable Housing in the Last Year: No      Medications and Allergies:     Current Outpatient Medications   Medication Sig Dispense Refill   • amLODIPine (NORVASC) 5 mg tablet Take 1 tablet (5 mg total) by mouth daily 90 tablet 3   • aspirin 81 MG tablet Take by mouth daily     • atorvastatin (LIPITOR) 10 mg tablet TAKE 1 TABLET BY MOUTH EVERY DAY 90 tablet 3   • esomeprazole (NexIUM) 20 mg capsule Take 1 capsule (20 mg total) by mouth daily in the early morning 90 capsule 3   • latanoprost (XALATAN) 0.005 % ophthalmic solution Administer 1 drop to both eyes daily at bedtime     • levothyroxine 50 mcg tablet Take 1 tablet (50 mcg total) by mouth daily Take on an empty stomach 90 tablet 0   • methocarbamol (ROBAXIN) 500 mg tablet Take 1 tablet (500 mg total) by mouth 4 (four) times a day As needed 60 tablet 2   • metoprolol tartrate (LOPRESSOR) 25 mg tablet Take 1 tablet (25 mg total) by mouth once as needed (take 1 hour prior to CT coronary angiogram to lower heart rate for test) for up to 1 dose 1 tablet 0   • rizatriptan (MAXALT) 10 mg tablet TAKE 1 TABLET BY MOUTH ONCE AS NEEDED FOR MIGRAINE FOR UP TO ONE DOSE. MAY REPEAT IN TWO HOURS IF NEEDED 9 tablet 0   • timolol (TIMOPTIC) 0.5 % ophthalmic solution Administer 1 drop to both eyes daily     • valsartan (DIOVAN) 160 mg tablet TAKE 1 TABLET BY MOUTH EVERY DAY 90 tablet 1     No current facility-administered medications for this visit.     Allergies   Allergen Reactions   • Clindamycin GI Intolerance     Per Patient   • Doxycycline Other (See Comments)     unknown   • Influenza Vaccines Other (See Comments)     Flu like symptoms    • Nitrous  Oxide       Immunizations:     Immunization History   Administered Date(s) Administered   • COVID-19 PFIZER VACCINE 0.3 ML IM 02/04/2021, 02/24/2021, 10/09/2021   • COVID-19 Pfizer vac (Vince-sucrose, gray cap) 12 yr+ IM 04/23/2022   • Hep A, adult 06/30/2017   • Influenza Split High Dose Preservative Free IM 09/30/2014, 11/02/2015, 12/03/2017   • Pneumococcal Conjugate 13-Valent 08/28/2017   • Pneumococcal Polysaccharide PPV23 05/30/2014   • Tdap 02/15/2018   • Zoster 06/24/2014   • Zoster Vaccine Recombinant 02/27/2023      Health Maintenance:         Topic Date Due   • Colorectal Cancer Screening  01/16/2028   • Hepatitis C Screening  Completed         Topic Date Due   • COVID-19 Vaccine (5 - 2023-24 season) 09/01/2023      Medicare Screening Tests and Risk Assessments:     Bola is here for his Subsequent Wellness visit.     Health Risk Assessment:   Patient rates overall health as good. Patient feels that their physical health rating is same. Patient is satisfied with their life. Eyesight was rated as same. Hearing was rated as slightly worse. Patient feels that their emotional and mental health rating is same. Patients states they are sometimes angry. Patient states they are sometimes unusually tired/fatigued. Pain experienced in the last 7 days has been some. Patient's pain rating has been 5/10. Patient states that he has experienced no weight loss or gain in last 6 months.     Depression Screening:   PHQ-2 Score: 1      Fall Risk Screening:   In the past year, patient has experienced: no history of falling in past year      Home Safety:  Patient has trouble with stairs inside or outside of their home. Patient has working smoke alarms and has no working carbon monoxide detector. Home safety hazards include: none.     Nutrition:   Current diet is Regular.     Medications:   Patient is not currently taking any over-the-counter supplements. Patient is able to manage medications.     Activities of Daily Living  "(ADLs)/Instrumental Activities of Daily Living (IADLs):   Walk and transfer into and out of bed and chair?: Yes  Dress and groom yourself?: Yes    Bathe or shower yourself?: Yes    Feed yourself? Yes  Do your laundry/housekeeping?: Yes  Manage your money, pay your bills and track your expenses?: Yes  Make your own meals?: Yes    Do your own shopping?: Yes    Previous Hospitalizations:   Any hospitalizations or ED visits within the last 12 months?: No      Advance Care Planning:   Living will: Yes    Durable POA for healthcare: Yes    Advanced directive: Yes      PREVENTIVE SCREENINGS      Cardiovascular Screening:    General: Screening Not Indicated and History Lipid Disorder      Diabetes Screening:     General: Screening Current      Colorectal Cancer Screening:     General: Screening Current      Prostate Cancer Screening:    General: Screening Not Indicated      Abdominal Aortic Aneurysm (AAA) Screening:    Risk factors include: age between 65-76 yo and tobacco use        Lung Cancer Screening:     General: Screening Not Indicated      Hepatitis C Screening:    General: Screening Current    Screening, Brief Intervention, and Referral to Treatment (SBIRT)    Screening      Single Item Drug Screening:  How often have you used an illegal drug (including marijuana) or a prescription medication for non-medical reasons in the past year? never    Single Item Drug Screen Score: 0  Interpretation: Negative screen for possible drug use disorder    No results found.     Physical Exam:     /86 (BP Location: Left arm, Patient Position: Sitting, Cuff Size: Standard)   Pulse 71   Temp (!) 97.4 °F (36.3 °C) (Tympanic)   Resp 18   Ht 5' 1.46\" (1.561 m)   Wt 69.9 kg (154 lb 3.2 oz)   SpO2 98%   BMI 28.70 kg/m²     Physical Exam  Vitals and nursing note reviewed.   Constitutional:       Appearance: Normal appearance. He is well-developed.   HENT:      Head: Normocephalic and atraumatic.      Right Ear: External ear " normal.      Left Ear: External ear normal.      Nose: Nose normal.   Eyes:      Extraocular Movements: Extraocular movements intact.      Conjunctiva/sclera: Conjunctivae normal.      Pupils: Pupils are equal, round, and reactive to light.   Cardiovascular:      Rate and Rhythm: Normal rate and regular rhythm.      Heart sounds: Normal heart sounds.   Pulmonary:      Effort: Pulmonary effort is normal.      Breath sounds: Normal breath sounds.   Abdominal:      General: Abdomen is flat. Bowel sounds are normal.      Palpations: Abdomen is soft.   Musculoskeletal:         General: Normal range of motion.      Cervical back: Normal range of motion and neck supple.   Skin:     General: Skin is warm and dry.      Capillary Refill: Capillary refill takes less than 2 seconds.   Neurological:      General: No focal deficit present.      Mental Status: He is alert and oriented to person, place, and time.   Psychiatric:         Mood and Affect: Mood normal.         Behavior: Behavior normal.         Thought Content: Thought content normal.         Judgment: Judgment normal.          Estefania Minor,

## 2024-04-17 ENCOUNTER — IOP CHECK (OUTPATIENT)
Dept: URBAN - METROPOLITAN AREA CLINIC 6 | Facility: CLINIC | Age: 74
End: 2024-04-17

## 2024-04-17 DIAGNOSIS — H04.123: ICD-10-CM

## 2024-04-17 DIAGNOSIS — H40.1132: ICD-10-CM

## 2024-04-17 PROCEDURE — 92012 INTRM OPH EXAM EST PATIENT: CPT

## 2024-04-17 PROCEDURE — 92133 CPTRZD OPH DX IMG PST SGM ON: CPT

## 2024-04-17 ASSESSMENT — TONOMETRY
OS_IOP_MMHG: 18
OD_IOP_MMHG: 18

## 2024-04-17 ASSESSMENT — VISUAL ACUITY
OS_SC: 20/25
OD_PH: 20/30-1
OD_SC: 20/40+2

## 2024-04-18 ENCOUNTER — OFFICE VISIT (OUTPATIENT)
Dept: CARDIOLOGY CLINIC | Facility: CLINIC | Age: 74
End: 2024-04-18
Payer: COMMERCIAL

## 2024-04-18 VITALS
DIASTOLIC BLOOD PRESSURE: 68 MMHG | WEIGHT: 154.8 LBS | BODY MASS INDEX: 29.23 KG/M2 | HEIGHT: 61 IN | SYSTOLIC BLOOD PRESSURE: 140 MMHG | OXYGEN SATURATION: 99 % | HEART RATE: 63 BPM

## 2024-04-18 DIAGNOSIS — R00.1 BRADYCARDIA: ICD-10-CM

## 2024-04-18 DIAGNOSIS — R07.9 CHEST PAIN, UNSPECIFIED TYPE: Primary | ICD-10-CM

## 2024-04-18 DIAGNOSIS — I25.84 CORONARY ARTERY CALCIFICATION: ICD-10-CM

## 2024-04-18 DIAGNOSIS — I25.10 CORONARY ARTERY CALCIFICATION: ICD-10-CM

## 2024-04-18 DIAGNOSIS — E78.2 MIXED HYPERLIPIDEMIA: ICD-10-CM

## 2024-04-18 DIAGNOSIS — I10 ESSENTIAL HYPERTENSION: ICD-10-CM

## 2024-04-18 PROCEDURE — 99214 OFFICE O/P EST MOD 30 MIN: CPT | Performed by: INTERNAL MEDICINE

## 2024-04-18 RX ORDER — ATORVASTATIN CALCIUM 20 MG/1
20 TABLET, FILM COATED ORAL DAILY
Qty: 90 TABLET | Refills: 3 | Status: SHIPPED | OUTPATIENT
Start: 2024-04-18

## 2024-04-18 NOTE — PROGRESS NOTES
"Cardiology Clinic Note    Bola Sanders 73 y.o. male   MRN: [id number]  Encounter: [id number]        Assessment / Plan:    # chest pain  Per prior cardiologist note:  \"Atypical chest pain most probably musculoskeletal aggravated with an element of anxiety.  No ischemic component was identified on prior evaluations\"  Nuclear stress in 2021 - normal perfusion  CT coronary angiogram 3/2024 -   No stenosis.  Superficial myocardial bridging of a 2 cm segment of the mid LAD.  Small hiatal hernia.   Coronary vasospasm would be on the differential.  Sx's improved with norvasc.  Sx's may be from hiatal hernia, also a consideration.    # coronary calcification  Mild -  small amount of calcified plaque in left main.   Calcium score 2.   ASA, statin    # HTN  Reports white coat HTN (so often higher in the office).     Valsartan 160 mg daily    Norvasc 5 mg daily    BP slightly above goal at home.   Wants to do low salt and see how that does.  He will let us know if still above goal.    We could ultiamtely go up on norvasc to 10mg as next step if needed.    # HLD  Lipitor 10mg  Last LDL 95  Increase lipitor to 20mg    # bradycardia  Reports HR as low as 47 bpm at times per apple watch (when resting)  He has a narrow QRS on EKG.  Can get HR to 160's with exercise.  No symptoms associated with low HR.  Noted on timolol eye drops  Continue to monitor.     # migraines  Very rare  On tripta      Today's Plan Summary:  See above assessment/plan for full details of today's plan.  Briefly,     Increase statin            Reason For Visit / Chief Complaint:  F/u -  hypertension, hyperlipidemia, chest pain    HPI:   Bola Sanders is a 73 y.o.  male with history as noted in the problem list and further detailed in the above assessment and plan.    Initial:   Nov 2023  Here to establish care with a new cardiologist  The patient has a history of HTN, HLD, and atypical chest pain with prior negative nuclear stress testing.  Last saw Dr Recio in " April 2023 -  no med changes at that time.  Today -  reports the chest pain is sharp.   Absolutely random in terms of when it occurs.   Last event was when walking in from garage.   Sat down and it went away.  Typically lasts about 15 minutes (only severe for a few seconds).    Occurs about 1 episode per month on average.   It is not exertional.  He is a serious road cyclist.  Never gets the chest pain with biking.  Can get HR up to 160 with exercise - no sx's with this.    Mild SOB since had covid - feels like lung capacity is a bit lower than previously.  Retired .  [employer].   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  Serious road cyclist.  Also likes mountain biking.   .  2 children.  Former smoker (quit in 1970s).    Interval:  Last visit -->  Had some exertional chest pain when biking.  Substernal / left sided chest pain - lasted only minutes.  This is new - previously has only had rest chest pain.    Plan last visit -->   Increase Norvasc to 5 mg daily  Obtain CT coronary angiogram    CT coronary angiogram - 03/15/24   Small amount of calcified plaque in left main.    No significant coronary stenosis.  Coronary calcium score - 2  Superficial myocardial bridging of a 2 cm segment of the mid LAD  Small hiatal hernia.    Today - no chest pain on higher dose of norvasc.  Recently biked 40 miles - no symptoms.  Little bit of weight gain lately.    Home BP - average systolic in the upper 130's.          Cardiac Imaging personally reviewed:  EKG 11-2-23  NSR at 60 bpm.   Holter or event monitor    Echo 2021  EF 60%.    JESSICA    Cardiac MRI    Stress testing Nuclear 2021 -  normal perfusion   Coronary CTA or [facility] CT coronary angiogram - 03/15/24   Small amount of calcified plaque in left main.    No significant coronary stenosis.  Coronary calcium score - 2  Superficial myocardial bridging of a 2 cm segment of the mid LAD  Small hiatal hernia.        [facility]    [facility]            Patient Active Problem List    Diagnosis Date Noted   • Coronary artery calcification 04/18/2024   • Family history of pancreatic cancer 05/25/2023   • History of COVID-19 01/23/2023   • Chronic pain of both knees 02/16/2021   • Medicare annual wellness visit, subsequent 11/17/2020   • Bilateral primary osteoarthritis of knee 08/13/2020   • ETD (Eustachian tube dysfunction), bilateral 08/21/2019   • Sensorineural hearing loss (SNHL), bilateral 08/21/2019   • Roberts's esophagus 02/13/2018   • Chronic GERD 08/28/2017   • Acquired hypothyroidism 11/17/2016   • Osteoarthrosis, localized, primary, knee, right 10/13/2015   • Essential hypertension 10/02/2015   • Mixed hyperlipidemia 10/02/2015   • Primary localized osteoarthritis of left knee 08/27/2015       Past Medical History:   Diagnosis Date   • Acute non-recurrent maxillary sinusitis 04/15/2019   • Allergic    • Angina effort    • Anxiety    • Roberts esophagus 2000   • Roberts's esophagus    • Bleeding from left ear 04/26/2019   • Chest pain 11/07/2019   • Chest pain, unspecified    • Colon polyp    • Cough 02/13/2018   • GERD (gastroesophageal reflux disease)    • Glaucoma    • Hernia 2014   • HL (hearing loss) 10 years ago   • Hyperlipidemia    • Hypertension    • Impacted cerumen of right ear 04/25/2019   • Macular hole, right eye 01/28/2019    Last Assessment & Plan:  Impression:  1. Macular hole, right eye (dx 11/2018 ~295 microns, now 391 micron) 2. PP OD (10/2018) 3. Cataract OS   Plan: - Second opinion. Lives in [place] with plan for surgery at [facility]. - Advocated getting the surgery as the best way to fix the MH - Follow up as needed   • Migraine    • Mild depression 11/26/2019   • Otorrhea, left 04/25/2019   • SBO (small bowel obstruction) ([facility]) 03/23/2019         Allergies   Allergen Reactions   • Clindamycin GI Intolerance     Per Patient   • Doxycycline Other (See Comments)     unknown   • Influenza Vaccines Other (See Comments)      Flu like symptoms    • Nitrous Oxide        Current Outpatient Medications   Medication Instructions   • amLODIPine (NORVASC) 5 mg, Oral, Daily   • aspirin 81 MG tablet Oral, Daily   • atorvastatin (LIPITOR) 20 mg, Oral, Daily   • esomeprazole (NEXIUM) 20 mg, Oral, Daily (early morning)   • latanoprost (XALATAN) 0.005 % ophthalmic solution 1 drop, Both Eyes, Daily at bedtime   • levothyroxine 50 mcg, Oral, Daily, Take on an empty stomach   • methocarbamol (ROBAXIN) 500 mg, Oral, 4 times daily, As needed   • rizatriptan (MAXALT) 10 mg tablet TAKE 1 TABLET BY MOUTH ONCE AS NEEDED FOR MIGRAINE FOR UP TO ONE DOSE. MAY REPEAT IN TWO HOURS IF NEEDED   • timolol (TIMOPTIC) 0.5 % ophthalmic solution 1 drop, Both Eyes, Daily   • valsartan (DIOVAN) 160 mg, Oral, Daily       Social History     Socioeconomic History   • Marital status: /Civil Union     Spouse name: Not on file   • Number of children: Not on file   • Years of education: Not on file   • Highest education level: Not on file   Occupational History   • Not on file   Tobacco Use   • Smoking status: Former     Current packs/day: 0.00     Average packs/day: 0.3 packs/day for 10.0 years (2.5 ttl pk-yrs)     Types: Cigarettes     Start date: 1968     Quit date: 1978     Years since quittin.3     Passive exposure: Never   • Smokeless tobacco: Never   Vaping Use   • Vaping status: Never Used   Substance and Sexual Activity   • Alcohol use: Yes     Alcohol/week: 4.0 standard drinks of alcohol     Types: 2 Glasses of wine, 2 Shots of liquor per week   • Drug use: Never   • Sexual activity: Yes     Partners: Female     Birth control/protection: None   Other Topics Concern   • Not on file   Social History Narrative   • Not on file     Social Determinants of Health     Financial Resource Strain: Low Risk  (2022)    Overall Financial Resource Strain (CARDIA)    • Difficulty of Paying Living Expenses: Not hard at all   Food Insecurity: No Food  "Insecurity (4/16/2024)    Hunger Vital Sign    • Worried About Running Out of Food in the Last Year: Never true    • Ran Out of Food in the Last Year: Never true   Transportation Needs: No Transportation Needs (4/16/2024)    PRAPARE - Transportation    • Lack of Transportation (Medical): No    • Lack of Transportation (Non-Medical): No   Physical Activity: Not on file   Stress: Not on file   Social Connections: Not on file   Intimate Partner Violence: Not on file   Housing Stability: Low Risk  (4/16/2024)    Housing Stability Vital Sign    • Unable to Pay for Housing in the Last Year: No    • Number of Places Lived in the Last Year: 1    • Unstable Housing in the Last Year: No       Family History   Problem Relation Age of Onset   • No Known Problems Mother    • Heart attack Father         age 83   • Heart disease Father    • Hearing loss Father    • Pancreatic cancer Brother    • Stroke Paternal Grandmother    • Breast cancer Paternal Aunt    • Esophageal cancer Paternal Aunt    • Heart attack Paternal Uncle        Physical Exam:  Blood pressure 140/68, pulse 63, height 5' 1\" (1.549 m), weight 70.2 kg (154 lb 12.8 oz), SpO2 99%.  Body mass index is 29.25 kg/m².  Wt Readings from Last 3 Encounters:   04/18/24 70.2 kg (154 lb 12.8 oz)   04/16/24 69.9 kg (154 lb 3.2 oz)   02/15/24 70.4 kg (155 lb 1.6 oz)     Physical Exam  Vitals reviewed.   Constitutional:       General: He is not in acute distress.     Appearance: He is not toxic-appearing.   Neck:      Comments: No JVD  Cardiovascular:      Rate and Rhythm: Normal rate and regular rhythm.      Heart sounds: No murmur heard.     No friction rub. No gallop.   Pulmonary:      Breath sounds: Normal breath sounds. No wheezing, rhonchi or rales.   Musculoskeletal:      Comments: Very trace LE edema   Neurological:      Mental Status: He is alert.         Labs & Results:  Lab Results   Component Value Date    SODIUM 140 01/24/2024    K 4.2 01/24/2024     01/24/2024 "    CO2 28 01/24/2024    BUN 12 01/24/2024    CREATININE 0.95 01/24/2024    GLUC 111 03/24/2019    CALCIUM 9.1 01/24/2024         Thank you for the opportunity to participate in the care of this patient.    Jesus Stephens MD, [facility]  Advanced Heart Failure and Transplant Cardiologist  Director of Cardio-Oncology  [facility]

## 2024-04-18 NOTE — PATIENT INSTRUCTIONS
Monitor your home blood pressures and if above goal we may need to increase the amlodipine    Increase the atorvastatin (Lipitor) to 20 mg daily

## 2024-04-26 ENCOUNTER — OFFICE VISIT (OUTPATIENT)
Dept: OBGYN CLINIC | Facility: MEDICAL CENTER | Age: 74
End: 2024-04-26
Payer: COMMERCIAL

## 2024-04-26 ENCOUNTER — HOSPITAL ENCOUNTER (OUTPATIENT)
Dept: CT IMAGING | Facility: HOSPITAL | Age: 74
Discharge: HOME | End: 2024-04-26
Attending: INTERNAL MEDICINE

## 2024-04-26 VITALS
HEIGHT: 61 IN | HEART RATE: 71 BPM | DIASTOLIC BLOOD PRESSURE: 78 MMHG | WEIGHT: 154 LBS | SYSTOLIC BLOOD PRESSURE: 155 MMHG | BODY MASS INDEX: 29.07 KG/M2

## 2024-04-26 DIAGNOSIS — G89.29 CHRONIC LEFT SHOULDER PAIN: ICD-10-CM

## 2024-04-26 DIAGNOSIS — M25.512 CHRONIC LEFT SHOULDER PAIN: ICD-10-CM

## 2024-04-26 DIAGNOSIS — G89.29 CHRONIC PAIN OF BOTH KNEES: ICD-10-CM

## 2024-04-26 DIAGNOSIS — M17.11 OSTEOARTHROSIS, LOCALIZED, PRIMARY, KNEE, RIGHT: ICD-10-CM

## 2024-04-26 DIAGNOSIS — M25.562 CHRONIC PAIN OF BOTH KNEES: ICD-10-CM

## 2024-04-26 DIAGNOSIS — M17.0 BILATERAL PRIMARY OSTEOARTHRITIS OF KNEE: Primary | ICD-10-CM

## 2024-04-26 DIAGNOSIS — M75.42 SUBACROMIAL IMPINGEMENT OF LEFT SHOULDER: ICD-10-CM

## 2024-04-26 DIAGNOSIS — M25.561 CHRONIC PAIN OF BOTH KNEES: ICD-10-CM

## 2024-04-26 PROCEDURE — 20610 DRAIN/INJ JOINT/BURSA W/O US: CPT | Performed by: ORTHOPAEDIC SURGERY

## 2024-04-26 PROCEDURE — 99214 OFFICE O/P EST MOD 30 MIN: CPT | Performed by: ORTHOPAEDIC SURGERY

## 2024-04-26 RX ORDER — LIDOCAINE HYDROCHLORIDE 10 MG/ML
2 INJECTION, SOLUTION INFILTRATION; PERINEURAL
Status: COMPLETED | OUTPATIENT
Start: 2024-04-26 | End: 2024-04-26

## 2024-04-26 RX ORDER — BUPIVACAINE HYDROCHLORIDE 2.5 MG/ML
2 INJECTION, SOLUTION INFILTRATION; PERINEURAL
Status: COMPLETED | OUTPATIENT
Start: 2024-04-26 | End: 2024-04-26

## 2024-04-26 RX ORDER — BETAMETHASONE SODIUM PHOSPHATE AND BETAMETHASONE ACETATE 3; 3 MG/ML; MG/ML
12 INJECTION, SUSPENSION INTRA-ARTICULAR; INTRALESIONAL; INTRAMUSCULAR; SOFT TISSUE
Status: COMPLETED | OUTPATIENT
Start: 2024-04-26 | End: 2024-04-26

## 2024-04-26 RX ADMIN — LIDOCAINE HYDROCHLORIDE 2 ML: 10 INJECTION, SOLUTION INFILTRATION; PERINEURAL at 13:00

## 2024-04-26 RX ADMIN — BETAMETHASONE SODIUM PHOSPHATE AND BETAMETHASONE ACETATE 12 MG: 3; 3 INJECTION, SUSPENSION INTRA-ARTICULAR; INTRALESIONAL; INTRAMUSCULAR; SOFT TISSUE at 13:00

## 2024-04-26 RX ADMIN — BUPIVACAINE HYDROCHLORIDE 2 ML: 2.5 INJECTION, SOLUTION INFILTRATION; PERINEURAL at 13:00

## 2024-04-26 NOTE — PROGRESS NOTES
Assessment:  No diagnosis found.    Plan:  73 y.o. male     Diagnostics reviewed and physical exam performed.  Diagnosis, treatment options and associated risks were discussed with the patient including no treatment, nonsurgical treatment and potential for surgical intervention.  The patient was given the opportunity to ask questions regarding each.     The above stated was discussed in layman's terms and the patient expressed understanding.  All questions were answered to the patient's satisfaction.     To do next visit:  No follow-ups on file.      Subjective:   Bola Sanders is a 73 y.o. male who presents     B/l knee Csi 11/17      Review of systems negative unless otherwise specified in HPI    Past Medical History:   Diagnosis Date    Acute non-recurrent maxillary sinusitis 04/15/2019    Allergic     Angina effort     Anxiety     Roberts esophagus 2000    Roberts's esophagus     Bleeding from left ear 04/26/2019    Chest pain 11/07/2019    Chest pain, unspecified     Colon polyp     Cough 02/13/2018    GERD (gastroesophageal reflux disease)     Glaucoma     Hernia 2014    HL (hearing loss) 10 years ago    Hyperlipidemia     Hypertension     Impacted cerumen of right ear 04/25/2019    Macular hole, right eye 01/28/2019    Last Assessment & Plan:  Impression:  1. Macular hole, right eye (dx 11/2018 ~295 microns, now 391 micron) 2. PP OD (10/2018) 3. Cataract OS   Plan: - Second opinion. Lives [place] with plan for surgery at [facility]. - Advocated getting the surgery as the best way to fix the MH - Follow up as needed    Migraine     Mild depression 11/26/2019    Otorrhea, left 04/25/2019    SBO (small bowel obstruction) (Former[facility]) 03/23/2019       Past Surgical History:   Procedure Laterality Date    CATARACT EXTRACTION      COLONOSCOPY  2022    EYE SURGERY      HERNIA REPAIR Right 2014    UPPER GASTROINTESTINAL ENDOSCOPY         Family History   Problem Relation Age of Onset    No Known Problems Mother     Heart attack  Father         age 83    Heart disease Father     Hearing loss Father     Pancreatic cancer Brother     Stroke Paternal Grandmother     Breast cancer Paternal Aunt     Esophageal cancer Paternal Aunt     Heart attack Paternal Uncle        Social History     Occupational History    Not on file   Tobacco Use    Smoking status: Former     Current packs/day: 0.00     Average packs/day: 0.3 packs/day for 10.0 years (2.5 ttl pk-yrs)     Types: Cigarettes     Start date: 1968     Quit date: 1978     Years since quittin.3     Passive exposure: Never    Smokeless tobacco: Never   Vaping Use    Vaping status: Never Used   Substance and Sexual Activity    Alcohol use: Yes     Alcohol/week: 4.0 standard drinks of alcohol     Types: 2 Glasses of wine, 2 Shots of liquor per week    Drug use: Never    Sexual activity: Yes     Partners: Female     Birth control/protection: None         Current Outpatient Medications:     amLODIPine (NORVASC) 5 mg tablet, Take 1 tablet (5 mg total) by mouth daily, Disp: 90 tablet, Rfl: 3    aspirin 81 MG tablet, Take by mouth daily, Disp: , Rfl:     atorvastatin (LIPITOR) 20 mg tablet, Take 1 tablet (20 mg total) by mouth daily, Disp: 90 tablet, Rfl: 3    esomeprazole (NexIUM) 20 mg capsule, Take 1 capsule (20 mg total) by mouth daily in the early morning, Disp: 90 capsule, Rfl: 3    latanoprost (XALATAN) 0.005 % ophthalmic solution, Administer 1 drop to both eyes daily at bedtime, Disp: , Rfl:     levothyroxine 50 mcg tablet, Take 1 tablet (50 mcg total) by mouth daily Take on an empty stomach, Disp: 90 tablet, Rfl: 0    methocarbamol (ROBAXIN) 500 mg tablet, Take 1 tablet (500 mg total) by mouth 4 (four) times a day As needed, Disp: 60 tablet, Rfl: 2    rizatriptan (MAXALT) 10 mg tablet, TAKE 1 TABLET BY MOUTH ONCE AS NEEDED FOR MIGRAINE FOR UP TO ONE DOSE. MAY REPEAT IN TWO HOURS IF NEEDED, Disp: 9 tablet, Rfl: 0    timolol (TIMOPTIC) 0.5 % ophthalmic solution, Administer 1 drop to  "both eyes daily, Disp: , Rfl:     valsartan (DIOVAN) 160 mg tablet, TAKE 1 TABLET BY MOUTH EVERY DAY, Disp: 90 tablet, Rfl: 1    Allergies   Allergen Reactions    Clindamycin GI Intolerance     Per Patient    Doxycycline Other (See Comments)     unknown    Influenza Vaccines Other (See Comments)     Flu like symptoms     Nitrous Oxide             Vitals:    04/26/24 1255   BP: 155/78   Pulse: 71       Objective:  Physical exam  General: Awake, Alert, Oriented  Eyes: Pupils equal, round and reactive to light  Heart: regular rate and rhythm  Lungs: No audible wheezing  Abdomen: soft                    Ortho Exam    Diagnostics, reviewed and taken today if performed as documented:    None performed        Procedures, if performed today:    Procedures    None performed      Portions of the record may have been created with voice recognition software.  Occasional wrong word or \"sound a like\" substitutions may have occurred due to the inherent limitations of voice recognition software.  Read the chart carefully and recognize, using context, where substitutions have occurred.      "

## 2024-04-26 NOTE — PROGRESS NOTES
73 y.o.male presents for evaluation.  He has return to weightbearing pain in both knees.  He has pain level of both knee joints, the pain is made worse bearing weight, the pain increases with increased activities.  At times his pain levels can be 9 out of 10.  He does admit to onset of lateral left upper arm pain.  He has a prior left clavicle fracture treated years ago.  He has no neck pain he has no paresthesias to the left upper extremity    Review of Systems  Review of systems negative unless otherwise specified in HPI    Past Medical History  Past Medical History:   Diagnosis Date    Acute non-recurrent maxillary sinusitis 04/15/2019    Allergic     Angina effort     Anxiety     Roberts esophagus 2000    Roberts's esophagus     Bleeding from left ear 04/26/2019    Chest pain 11/07/2019    Chest pain, unspecified     Colon polyp     Cough 02/13/2018    GERD (gastroesophageal reflux disease)     Glaucoma     Hernia 2014    HL (hearing loss) 10 years ago    Hyperlipidemia     Hypertension     Impacted cerumen of right ear 04/25/2019    Macular hole, right eye 01/28/2019    Last Assessment & Plan:  Impression:  1. Macular hole, right eye (dx 11/2018 ~295 microns, now 391 micron) 2. PP OD (10/2018) 3. Cataract OS   Plan: - Second opinion. [place] plan for surgery at [facility]. - Advocated getting the surgery as the best way to fix the MH - Follow up as needed    Migraine     Mild depression 11/26/2019    Otorrhea, left 04/25/2019    SBO (small bowel obstruction) ([facility]) 03/23/2019       Past Surgical History  Past Surgical History:   Procedure Laterality Date    CATARACT EXTRACTION      COLONOSCOPY  2022    EYE SURGERY      HERNIA REPAIR Right 2014    UPPER GASTROINTESTINAL ENDOSCOPY         Current Medications  Current Outpatient Medications on File Prior to Visit   Medication Sig Dispense Refill    amLODIPine (NORVASC) 5 mg tablet Take 1 tablet (5 mg total) by mouth daily 90 tablet 3    aspirin 81 MG tablet  Take by mouth daily      atorvastatin (LIPITOR) 20 mg tablet Take 1 tablet (20 mg total) by mouth daily 90 tablet 3    esomeprazole (NexIUM) 20 mg capsule Take 1 capsule (20 mg total) by mouth daily in the early morning 90 capsule 3    latanoprost (XALATAN) 0.005 % ophthalmic solution Administer 1 drop to both eyes daily at bedtime      levothyroxine 50 mcg tablet Take 1 tablet (50 mcg total) by mouth daily Take on an empty stomach 90 tablet 0    methocarbamol (ROBAXIN) 500 mg tablet Take 1 tablet (500 mg total) by mouth 4 (four) times a day As needed 60 tablet 2    rizatriptan (MAXALT) 10 mg tablet TAKE 1 TABLET BY MOUTH ONCE AS NEEDED FOR MIGRAINE FOR UP TO ONE DOSE. MAY REPEAT IN TWO HOURS IF NEEDED 9 tablet 0    timolol (TIMOPTIC) 0.5 % ophthalmic solution Administer 1 drop to both eyes daily      valsartan (DIOVAN) 160 mg tablet TAKE 1 TABLET BY MOUTH EVERY DAY 90 tablet 1     No current facility-administered medications on file prior to visit.       Recent Labs (HCT,HGB,PT,INR,ESR,CRP,GLU,HgA1C)  0   Lab Value Date/Time    HCT 49.5 (H) 01/24/2024 0754    HGB 16.6 01/24/2024 0754    WBC 5.62 01/24/2024 0754         Physical exam  General: Awake, Alert, Oriented  Eyes: Pupils equal, round and reactive to light  Heart: regular rate and rhythm  Lungs: No audible wheezing  Abdomen: soft  His neck is nontender with good mobility.  Left clavicle has a nontender mass.  Left shoulder is incomplete forward flexion abduction.  There is some weakness of external rotation strength testing  Each knee is in varus.  Neither knee is effusion.  Each knee has some bony enlargement tenderness medially    Imaging  No new x-rays accompany him    Procedure  Injections of corticosteroid to provide for bilateral knee joints, and the left subacromial space.  They are documented below      Large joint arthrocentesis: R knee  Universal Protocol:  Consent given by: patient  Supporting Documentation  Indications: pain   Procedure  Details  Location: knee - R knee  Needle size: 22 G  Ultrasound guidance: no  Medications administered: 2 mL bupivacaine 0.25 %; 2 mL lidocaine 1 %; 12 mg betamethasone acetate-betamethasone sodium phosphate 6 (3-3) mg/mL    Patient tolerance: patient tolerated the procedure well with no immediate complications  Dressing:  Sterile dressing applied      Large joint arthrocentesis: L knee  Universal Protocol:  Consent given by: patient  Supporting Documentation  Indications: pain   Procedure Details  Location: knee - L knee  Needle size: 22 G  Medications administered: 2 mL bupivacaine 0.25 %; 2 mL lidocaine 1 %; 12 mg betamethasone acetate-betamethasone sodium phosphate 6 (3-3) mg/mL    Patient tolerance: patient tolerated the procedure well with no immediate complications  Dressing:  Sterile dressing applied      Large joint arthrocentesis: L subacromial bursa  Universal Protocol:  Consent given by: patient  Supporting Documentation  Indications: pain   Procedure Details  Location: shoulder - L subacromial bursa  Needle size: 22 G  Medications administered: 2 mL bupivacaine 0.25 %; 2 mL lidocaine 1 %; 12 mg betamethasone acetate-betamethasone sodium phosphate 6 (3-3) mg/mL    Patient tolerance: patient tolerated the procedure well with no immediate complications  Dressing:  Sterile dressing applied            Assessment/Plan:   73 y.o.male who has return of bilateral knee pain consistent with early wear patterns, in addition he has onset of left shoulder subacromial impingement symptoms.  All diagnoses were discussed with the patient.  Treatment option including risk, benefits, return discussed in detail.  An injection of corticosteroid to the left subacromial space and bilateral knee joints are indicated.  They are advised, accepted, and administered as outlined above.  A supervised program of physical therapy for left rotator cuff strengthening is my recommendation.  Office follow-up in 3 months time with complete  my recommendations

## 2024-04-27 DIAGNOSIS — E03.9 ACQUIRED HYPOTHYROIDISM: ICD-10-CM

## 2024-04-27 RX ORDER — LEVOTHYROXINE SODIUM 0.05 MG/1
50 TABLET ORAL DAILY
Qty: 90 TABLET | Refills: 1 | Status: SHIPPED | OUTPATIENT
Start: 2024-04-27

## 2024-05-16 PROBLEM — Z00.00 MEDICARE ANNUAL WELLNESS VISIT, SUBSEQUENT: Status: RESOLVED | Noted: 2020-11-17 | Resolved: 2024-05-16

## 2024-05-24 DIAGNOSIS — I10 ESSENTIAL HYPERTENSION: ICD-10-CM

## 2024-05-24 RX ORDER — VALSARTAN 160 MG/1
160 TABLET ORAL DAILY
Qty: 90 TABLET | Refills: 1 | Status: SHIPPED | OUTPATIENT
Start: 2024-05-24

## 2024-06-20 ENCOUNTER — TELEPHONE (OUTPATIENT)
Dept: CARDIOLOGY CLINIC | Facility: CLINIC | Age: 74
End: 2024-06-20

## 2024-06-20 NOTE — TELEPHONE ENCOUNTER
Spoke to pt regarding his symptoms. He'd like to be evaluated in the office. Pt scheduled for Monday 6/24 at 1120am.       Jesus Stephens MD   to Me       6/20/24 10:39 AM    Jeramy,    Can you touch base with the patient with the following thoughts I have.    He is on amlodipine and this is known to cause a little bit of leg swelling in may people.  This may be the cause.  If he is feeling well then we can just evaluate with an examination at the next visit.  If he has severe edema or is feeling shortness of breath or other concerning symptoms we should move up his appointment.    He can try some compression socks during the day or when he is riding his long bike rides which may help.  He could purchase some compression socks for athletes on [employer].    Thanks  [facility]           6/20/24  9:59 AM  Gricel Fam routed this conversation to Jesus Stephens MD       PM    6/20/24  9:58 AM  Estrellita Montague routed this conversation to [facility]  Bola Sanders   to  [facility] (supporting Jesus Stephens MD)         6/20/24  9:42 AM  Last evening, one of our guests, a retired doctor friend of mine, noticed that I have edema in my lower legs, and considering that I am a regular biker, it was surprising to see the swelling in my lower legs. Anyway, he suggested that I update you with this information.  Blood pressure data and chart are also attached.   Attachments   BP-3.jpg     B-P.pdf

## 2024-06-24 ENCOUNTER — OFFICE VISIT (OUTPATIENT)
Dept: CARDIOLOGY CLINIC | Facility: CLINIC | Age: 74
End: 2024-06-24
Payer: COMMERCIAL

## 2024-06-24 VITALS
HEIGHT: 61 IN | OXYGEN SATURATION: 98 % | SYSTOLIC BLOOD PRESSURE: 134 MMHG | HEART RATE: 62 BPM | BODY MASS INDEX: 29 KG/M2 | WEIGHT: 153.6 LBS | DIASTOLIC BLOOD PRESSURE: 68 MMHG

## 2024-06-24 DIAGNOSIS — I10 ESSENTIAL HYPERTENSION: ICD-10-CM

## 2024-06-24 DIAGNOSIS — E78.2 MIXED HYPERLIPIDEMIA: ICD-10-CM

## 2024-06-24 DIAGNOSIS — R00.1 BRADYCARDIA: ICD-10-CM

## 2024-06-24 DIAGNOSIS — I25.10 CORONARY ARTERY CALCIFICATION: ICD-10-CM

## 2024-06-24 DIAGNOSIS — I25.84 CORONARY ARTERY CALCIFICATION: ICD-10-CM

## 2024-06-24 DIAGNOSIS — R07.9 CHEST PAIN, UNSPECIFIED TYPE: ICD-10-CM

## 2024-06-24 DIAGNOSIS — R60.9 EDEMA, UNSPECIFIED TYPE: Primary | ICD-10-CM

## 2024-06-24 PROCEDURE — 99214 OFFICE O/P EST MOD 30 MIN: CPT | Performed by: INTERNAL MEDICINE

## 2024-06-24 NOTE — PROGRESS NOTES
"Cardiology Clinic Note    Bola Sanders 73 y.o. male   MRN: [id number]  Encounter: [id number]        Assessment / Plan:    # chest pain  Per prior cardiologist note:  \"Atypical chest pain most probably musculoskeletal aggravated with an element of anxiety.  No ischemic component was identified on prior evaluations\"  Nuclear stress in 2021 - normal perfusion  CT coronary angiogram 3/2024 -   No stenosis.  Superficial myocardial bridging of a 2 cm segment of the mid LAD.  Small hiatal hernia.   Coronary vasospasm would be on the differential.  Sx's improved with norvasc.  Sx's may be from hiatal hernia, also a consideration.    # coronary calcification  Mild -  small amount of calcified plaque in left main.   Calcium score 2.   ASA, statin    # Edema  Reason for visit today.  He has very minimal edema on exam.  No JVP elevation.  Suspect due to amlodipine.  It may be worse at the end of the day, particularly after long bike rides.  Recommend adding compression socks, particularly during workouts  Check echo to exclude more concerning process such as heart failure    # HTN  Reports white coat HTN (so often higher in the office).       Valsartan 160 mg daily      Norvasc 5 mg daily    # HLD  Last LDL 95  Lipitor 20 mg daily  (increased last visit)    # bradycardia  Reports HR as low as 47 bpm at times per apple watch (when resting)  He has a narrow QRS on EKG.  Can get HR to 160's with exercise.  No symptoms associated with low HR.  Noted on timolol eye drops  Continue to monitor.     # migraines  Very rare  On triptan      Today's Plan Summary:  See above assessment/plan for full details of today's plan.  Briefly,     Here today to discuss edema of the lower extremities that was noted by a physician at dinner party.  The edema is present but unimpressive.  JVP is not elevated.  Add compression socks.  Update echo.              Reason For Visit / Chief Complaint:  F/u -  hypertension, hyperlipidemia, chest pain    HPI: "   Bola Sanders is a 73 y.o.  male with history as noted in the problem list and further detailed in the above assessment and plan.    Initial:   Nov 2023  Here to establish care with a new cardiologist  The patient has a history of HTN, HLD, and atypical chest pain with prior negative nuclear stress testing.  Last saw Dr Recio in April 2023 -  no med changes at that time.  Today -  reports the chest pain is sharp.   Absolutely random in terms of when it occurs.   Last event was when walking in from garage.   Sat down and it went away.  Typically lasts about 15 minutes (only severe for a few seconds).    Occurs about 1 episode per month on average.   It is not exertional.  He is a serious road cyclist.  Never gets the chest pain with biking.  Can get HR up to 160 with exercise - no sx's with this.    Mild SOB since had covid - feels like lung capacity is a bit lower than previously.  Retired .  [employer].   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  Serious road cyclist.  Also likes mountain biking.   .  2 children.  Former smoker (quit in 1970s).    Interval:  Last visit -->  reviewed CT coronary angiogram.  no chest pain on higher dose of norvasc.  Recently biked 40 miles - no symptoms.     Plan last visit -->    Increase statin    Called in with leg edema - visit moved up.     Today - was at dinner party last week - retired doctor noted he had pitting leg edema.  Had done 18 mile bike ride that morning.    Patient hasn't really noticed any edema since that time.    Chest pain is doing well.            Cardiac Imaging personally reviewed:  EKG 11-2-23  NSR at 60 bpm.   Holter or event monitor    Echo 2021  EF 60%.    JESSICA    Cardiac MRI    Stress testing Nuclear 2021 -  normal perfusion   Coronary CTA or [facility] CT coronary angiogram - 03/15/24   Small amount of calcified plaque in left main.    No significant coronary stenosis.  Coronary calcium  score - 2  Superficial myocardial bridging of a 2 cm segment of the mid LAD  Small hiatal hernia.       [facility]    [facility]            Patient Active Problem List    Diagnosis Date Noted   • Coronary artery calcification 04/18/2024   • Family history of pancreatic cancer 05/25/2023   • History of COVID-19 01/23/2023   • Chronic pain of both knees 02/16/2021   • Bilateral primary osteoarthritis of knee 08/13/2020   • ETD (Eustachian tube dysfunction), bilateral 08/21/2019   • Sensorineural hearing loss (SNHL), bilateral 08/21/2019   • Roberts's esophagus 02/13/2018   • Chronic GERD 08/28/2017   • Acquired hypothyroidism 11/17/2016   • Osteoarthrosis, localized, primary, knee, right 10/13/2015   • Essential hypertension 10/02/2015   • Mixed hyperlipidemia 10/02/2015   • Primary localized osteoarthritis of left knee 08/27/2015       Past Medical History:   Diagnosis Date   • Acute non-recurrent maxillary sinusitis 04/15/2019   • Allergic    • Angina effort    • Anxiety    • Roberts esophagus 2000   • Roberts's esophagus    • Bleeding from left ear 04/26/2019   • Chest pain 11/07/2019   • Chest pain, unspecified    • Colon polyp    • Cough 02/13/2018   • GERD (gastroesophageal reflux disease)    • Glaucoma    • Hernia 2014   • HL (hearing loss) 10 years ago   • Hyperlipidemia    • Hypertension    • Impacted cerumen of right ear 04/25/2019   • Macular hole, right eye 01/28/2019    Last Assessment & Plan:  Impression:  1. Macular hole, right eye (dx 11/2018 ~295 microns, now 391 micron) 2. PP OD (10/2018) 3. Cataract OS   Plan: - Second opinion. Lives in [place] with plan for surgery at [facility]. - Advocated getting the surgery as the best way to fix the MH - Follow up as needed   • Migraine    • Mild depression 11/26/2019   • Otorrhea, left 04/25/2019   • SBO (small bowel obstruction) ([facility]) 03/23/2019         Allergies   Allergen Reactions   • Clindamycin GI Intolerance     Per Patient   • Doxycycline Other (See Comments)     unknown    • Influenza Vaccines Other (See Comments)     Flu like symptoms    • Nitrous Oxide        Current Outpatient Medications   Medication Instructions   • amLODIPine (NORVASC) 5 mg, Oral, Daily   • aspirin 81 MG tablet Oral, Daily   • atorvastatin (LIPITOR) 20 mg, Oral, Daily   • bupivacaine 0.25% - 20 mL, sodium chloride 0.9% inj - 20 mL, bupivacaine liposomal 1.3% (EXPAREL) - 20 mL    • esomeprazole (NEXIUM) 20 mg, Oral, Daily (early morning)   • latanoprost (XALATAN) 0.005 % ophthalmic solution 1 drop, Both Eyes, Daily at bedtime   • levothyroxine 50 mcg, Oral, Daily, Take on an empty stomach   • methocarbamol (ROBAXIN) 500 mg, Oral, 4 times daily, As needed   • rizatriptan (MAXALT) 10 mg tablet TAKE 1 TABLET BY MOUTH ONCE AS NEEDED FOR MIGRAINE FOR UP TO ONE DOSE. MAY REPEAT IN TWO HOURS IF NEEDED   • timolol (TIMOPTIC) 0.5 % ophthalmic solution 2 drops, Both Eyes, Daily   • valsartan (DIOVAN) 160 mg, Oral, Daily       Social History     Socioeconomic History   • Marital status: /Civil Union     Spouse name: Not on file   • Number of children: Not on file   • Years of education: Not on file   • Highest education level: Not on file   Occupational History   • Not on file   Tobacco Use   • Smoking status: Former     Current packs/day: 0.00     Average packs/day: 0.3 packs/day for 10.0 years (2.5 ttl pk-yrs)     Types: Cigarettes     Start date: 1968     Quit date: 1978     Years since quittin.5     Passive exposure: Never   • Smokeless tobacco: Never   Vaping Use   • Vaping status: Never Used   Substance and Sexual Activity   • Alcohol use: Yes     Alcohol/week: 4.0 standard drinks of alcohol     Types: 2 Glasses of wine, 2 Shots of liquor per week   • Drug use: Never   • Sexual activity: Yes     Partners: Female     Birth control/protection: None   Other Topics Concern   • Not on file   Social History Narrative   • Not on file     Social Determinants of Health     Financial Resource Strain: Low  "Risk  (12/13/2022)    Overall Financial Resource Strain (CARDIA)    • Difficulty of Paying Living Expenses: Not hard at all   Food Insecurity: No Food Insecurity (4/16/2024)    Hunger Vital Sign    • Worried About Running Out of Food in the Last Year: Never true    • Ran Out of Food in the Last Year: Never true   Transportation Needs: No Transportation Needs (4/16/2024)    PRAPARE - Transportation    • Lack of Transportation (Medical): No    • Lack of Transportation (Non-Medical): No   Physical Activity: Not on file   Stress: Not on file   Social Connections: Not on file   Intimate Partner Violence: Not on file   Housing Stability: Low Risk  (4/16/2024)    Housing Stability Vital Sign    • Unable to Pay for Housing in the Last Year: No    • Number of Times Moved in the Last Year: 1    • Homeless in the Last Year: No       Family History   Problem Relation Age of Onset   • No Known Problems Mother    • Heart attack Father         age 83   • Heart disease Father    • Hearing loss Father    • Pancreatic cancer Brother    • Stroke Paternal Grandmother    • Breast cancer Paternal Aunt    • Esophageal cancer Paternal Aunt    • Heart attack Paternal Uncle        Physical Exam:  Blood pressure 134/68, pulse 62, height 5' 1\" (1.549 m), weight 69.7 kg (153 lb 9.6 oz), SpO2 98%.  Body mass index is 29.02 kg/m².  Wt Readings from Last 3 Encounters:   06/24/24 69.7 kg (153 lb 9.6 oz)   04/26/24 69.9 kg (154 lb)   04/18/24 70.2 kg (154 lb 12.8 oz)     Physical Exam  Vitals reviewed.   Constitutional:       General: He is not in acute distress.     Appearance: He is not toxic-appearing.   Neck:      Comments: No JVD  Cardiovascular:      Rate and Rhythm: Normal rate and regular rhythm.      Heart sounds: No murmur heard.     No friction rub. No gallop.   Pulmonary:      Breath sounds: Normal breath sounds. No wheezing, rhonchi or rales.   Musculoskeletal:      Comments: trace LE edema   Neurological:      Mental Status: He is " alert.         Labs & Results:  Lab Results   Component Value Date    SODIUM 140 01/24/2024    K 4.2 01/24/2024     01/24/2024    CO2 28 01/24/2024    BUN 12 01/24/2024    CREATININE 0.95 01/24/2024    GLUC 111 03/24/2019    CALCIUM 9.1 01/24/2024         Thank you for the opportunity to participate in the care of this patient.    Jesus Stephens MD, [facility]  Advanced Heart Failure and Transplant Cardiologist  Director of Cardio-Oncology  [facility]

## 2024-07-24 ENCOUNTER — HOSPITAL ENCOUNTER (OUTPATIENT)
Dept: NON INVASIVE DIAGNOSTICS | Facility: CLINIC | Age: 74
Discharge: HOME | End: 2024-07-24
Payer: COMMERCIAL

## 2024-07-24 VITALS
HEART RATE: 62 BPM | SYSTOLIC BLOOD PRESSURE: 134 MMHG | HEIGHT: 61 IN | DIASTOLIC BLOOD PRESSURE: 68 MMHG | BODY MASS INDEX: 28.89 KG/M2 | WEIGHT: 153 LBS

## 2024-07-24 DIAGNOSIS — R60.9 EDEMA, UNSPECIFIED TYPE: ICD-10-CM

## 2024-07-24 LAB
AORTIC ROOT: 2.6 CM
AORTIC VALVE MEAN VELOCITY: 10.8 M/S
APICAL FOUR CHAMBER EJECTION FRACTION: 66 %
AV LVOT MEAN GRADIENT: 3 MMHG
AV LVOT PEAK GRADIENT: 5 MMHG
AV MEAN GRADIENT: 5 MMHG
AV PEAK GRADIENT: 12 MMHG
AV VELOCITY RATIO: 0.66
BSA FOR ECHO PROCEDURE: 1.69 M2
DOP CALC AO PEAK VEL: 1.73 M/S
DOP CALC AO VTI: 36.16 CM
DOP CALC LVOT PEAK VEL VTI: 25.67 CM
DOP CALC LVOT PEAK VEL: 1.14 M/S
E WAVE DECELERATION TIME: 313 MS
E/A RATIO: 1.01
FRACTIONAL SHORTENING: 29 (ref 28–44)
INTERVENTRICULAR SEPTUM IN DIASTOLE (PARASTERNAL SHORT AXIS VIEW): 1.1 CM
INTERVENTRICULAR SEPTUM: 1.1 CM (ref 0.6–1.1)
LAAS-AP2: 13 CM2
LAAS-AP4: 12 CM2
LEFT ATRIUM SIZE: 3.4 CM
LEFT ATRIUM VOLUME (MOD BIPLANE): 30 ML
LEFT ATRIUM VOLUME INDEX (MOD BIPLANE): 17.8 ML/M2
LEFT INTERNAL DIMENSION IN SYSTOLE: 2.7 CM (ref 2.1–4)
LEFT VENTRICULAR INTERNAL DIMENSION IN DIASTOLE: 3.8 CM (ref 3.5–6)
LEFT VENTRICULAR POSTERIOR WALL IN END DIASTOLE: 1.2 CM
LEFT VENTRICULAR STROKE VOLUME: 35 ML
LVSV (TEICH): 35 ML
MV E'TISSUE VEL-LAT: 7 CM/S
MV E'TISSUE VEL-SEP: 9 CM/S
MV PEAK A VEL: 1.06 M/S
MV PEAK E VEL: 107 CM/S
MV STENOSIS PRESSURE HALF TIME: 91 MS
MV VALVE AREA P 1/2 METHOD: 2.42
RIGHT ATRIUM AREA SYSTOLE A4C: 10.5 CM2
RIGHT VENTRICLE ID DIMENSION: 2.7 CM
SL CV LEFT ATRIUM LENGTH A2C: 4.7 CM
SL CV LV EF: 60
SL CV PED ECHO LEFT VENTRICLE DIASTOLIC VOLUME (MOD BIPLANE) 2D: 61 ML
SL CV PED ECHO LEFT VENTRICLE SYSTOLIC VOLUME (MOD BIPLANE) 2D: 26 ML
TR MAX PG: 13 MMHG
TR PEAK VELOCITY: 1.8 M/S
TRICUSPID VALVE PEAK REGURGITATION VELOCITY: 1.83 M/S

## 2024-07-24 PROCEDURE — 93306 TTE W/DOPPLER COMPLETE: CPT

## 2024-07-24 PROCEDURE — 93306 TTE W/DOPPLER COMPLETE: CPT | Performed by: INTERNAL MEDICINE

## 2024-08-26 ENCOUNTER — APPOINTMENT (OUTPATIENT)
Dept: LAB | Age: 74
End: 2024-08-26
Payer: COMMERCIAL

## 2024-08-26 DIAGNOSIS — E03.9 ACQUIRED HYPOTHYROIDISM: ICD-10-CM

## 2024-08-26 DIAGNOSIS — I10 ESSENTIAL HYPERTENSION: ICD-10-CM

## 2024-08-26 DIAGNOSIS — E78.2 MIXED HYPERLIPIDEMIA: ICD-10-CM

## 2024-08-26 LAB
ALBUMIN SERPL BCG-MCNC: 4.1 G/DL (ref 3.5–5)
ALP SERPL-CCNC: 54 U/L (ref 34–104)
ALT SERPL W P-5'-P-CCNC: 12 U/L (ref 7–52)
ANION GAP SERPL CALCULATED.3IONS-SCNC: 8 MMOL/L (ref 4–13)
AST SERPL W P-5'-P-CCNC: 25 U/L (ref 13–39)
BASOPHILS # BLD AUTO: 0.03 THOUSANDS/ÂΜL (ref 0–0.1)
BASOPHILS NFR BLD AUTO: 1 % (ref 0–1)
BILIRUB SERPL-MCNC: 0.81 MG/DL (ref 0.2–1)
BUN SERPL-MCNC: 19 MG/DL (ref 5–25)
CALCIUM SERPL-MCNC: 9.2 MG/DL (ref 8.4–10.2)
CHLORIDE SERPL-SCNC: 103 MMOL/L (ref 96–108)
CHOLEST SERPL-MCNC: 192 MG/DL
CO2 SERPL-SCNC: 27 MMOL/L (ref 21–32)
CREAT SERPL-MCNC: 1.04 MG/DL (ref 0.6–1.3)
EOSINOPHIL # BLD AUTO: 0.23 THOUSAND/ÂΜL (ref 0–0.61)
EOSINOPHIL NFR BLD AUTO: 4 % (ref 0–6)
ERYTHROCYTE [DISTWIDTH] IN BLOOD BY AUTOMATED COUNT: 12.9 % (ref 11.6–15.1)
GFR SERPL CREATININE-BSD FRML MDRD: 69 ML/MIN/1.73SQ M
GLUCOSE P FAST SERPL-MCNC: 96 MG/DL (ref 65–99)
HCT VFR BLD AUTO: 46.3 % (ref 36.5–49.3)
HDLC SERPL-MCNC: 74 MG/DL
HGB BLD-MCNC: 15.6 G/DL (ref 12–17)
IMM GRANULOCYTES # BLD AUTO: 0.01 THOUSAND/UL (ref 0–0.2)
IMM GRANULOCYTES NFR BLD AUTO: 0 % (ref 0–2)
LDLC SERPL CALC-MCNC: 101 MG/DL (ref 0–100)
LYMPHOCYTES # BLD AUTO: 1.79 THOUSANDS/ÂΜL (ref 0.6–4.47)
LYMPHOCYTES NFR BLD AUTO: 34 % (ref 14–44)
MCH RBC QN AUTO: 29.6 PG (ref 26.8–34.3)
MCHC RBC AUTO-ENTMCNC: 33.7 G/DL (ref 31.4–37.4)
MCV RBC AUTO: 88 FL (ref 82–98)
MONOCYTES # BLD AUTO: 0.51 THOUSAND/ÂΜL (ref 0.17–1.22)
MONOCYTES NFR BLD AUTO: 10 % (ref 4–12)
NEUTROPHILS # BLD AUTO: 2.78 THOUSANDS/ÂΜL (ref 1.85–7.62)
NEUTS SEG NFR BLD AUTO: 51 % (ref 43–75)
NRBC BLD AUTO-RTO: 0 /100 WBCS
PLATELET # BLD AUTO: 215 THOUSANDS/UL (ref 149–390)
PMV BLD AUTO: 10.9 FL (ref 8.9–12.7)
POTASSIUM SERPL-SCNC: 4.5 MMOL/L (ref 3.5–5.3)
PROT SERPL-MCNC: 6.5 G/DL (ref 6.4–8.4)
RBC # BLD AUTO: 5.27 MILLION/UL (ref 3.88–5.62)
SODIUM SERPL-SCNC: 138 MMOL/L (ref 135–147)
TRIGL SERPL-MCNC: 86 MG/DL
TSH SERPL DL<=0.05 MIU/L-ACNC: 2.09 UIU/ML (ref 0.45–4.5)
WBC # BLD AUTO: 5.35 THOUSAND/UL (ref 4.31–10.16)

## 2024-08-26 PROCEDURE — 80061 LIPID PANEL: CPT

## 2024-08-26 PROCEDURE — 84443 ASSAY THYROID STIM HORMONE: CPT

## 2024-08-26 PROCEDURE — 85025 COMPLETE CBC W/AUTO DIFF WBC: CPT

## 2024-08-26 PROCEDURE — 36415 COLL VENOUS BLD VENIPUNCTURE: CPT

## 2024-08-26 PROCEDURE — 80053 COMPREHEN METABOLIC PANEL: CPT

## 2024-08-28 DIAGNOSIS — E03.9 ACQUIRED HYPOTHYROIDISM: ICD-10-CM

## 2024-08-29 RX ORDER — LEVOTHYROXINE SODIUM 50 UG/1
50 TABLET ORAL DAILY
Qty: 90 TABLET | Refills: 1 | Status: SHIPPED | OUTPATIENT
Start: 2024-08-29

## 2024-09-09 ENCOUNTER — IOP CHECK (OUTPATIENT)
Dept: URBAN - METROPOLITAN AREA CLINIC 6 | Facility: CLINIC | Age: 74
End: 2024-09-09

## 2024-09-09 DIAGNOSIS — H04.123: ICD-10-CM

## 2024-09-09 DIAGNOSIS — H40.1132: ICD-10-CM

## 2024-09-09 PROCEDURE — 92012 INTRM OPH EXAM EST PATIENT: CPT

## 2024-09-09 ASSESSMENT — TONOMETRY
OS_IOP_MMHG: 22
OD_IOP_MMHG: 22

## 2024-09-09 ASSESSMENT — VISUAL ACUITY
OD_SC: 20/30
OS_SC: 20/25+1

## 2024-09-19 ENCOUNTER — OFFICE VISIT (OUTPATIENT)
Dept: CARDIOLOGY CLINIC | Facility: CLINIC | Age: 74
End: 2024-09-19
Payer: COMMERCIAL

## 2024-09-19 VITALS
OXYGEN SATURATION: 97 % | SYSTOLIC BLOOD PRESSURE: 128 MMHG | DIASTOLIC BLOOD PRESSURE: 66 MMHG | WEIGHT: 152.2 LBS | HEART RATE: 58 BPM | HEIGHT: 61 IN | BODY MASS INDEX: 28.74 KG/M2

## 2024-09-19 DIAGNOSIS — I10 PRIMARY HYPERTENSION: Primary | ICD-10-CM

## 2024-09-19 DIAGNOSIS — R00.1 BRADYCARDIA: ICD-10-CM

## 2024-09-19 DIAGNOSIS — I25.10 CORONARY ARTERY CALCIFICATION: ICD-10-CM

## 2024-09-19 DIAGNOSIS — E78.2 MIXED HYPERLIPIDEMIA: ICD-10-CM

## 2024-09-19 DIAGNOSIS — R07.9 CHEST PAIN, UNSPECIFIED TYPE: ICD-10-CM

## 2024-09-19 DIAGNOSIS — I25.84 CORONARY ARTERY CALCIFICATION: ICD-10-CM

## 2024-09-19 PROCEDURE — 99214 OFFICE O/P EST MOD 30 MIN: CPT | Performed by: INTERNAL MEDICINE

## 2024-09-19 NOTE — PROGRESS NOTES
"Cardiology Clinic Note    Bola Sanders 73 y.o. male   MRN: [id number]  Encounter: [id number]        Assessment / Plan:    # chest pain  Per prior cardiologist note:  \"Atypical chest pain most probably musculoskeletal aggravated with an element of anxiety.  No ischemic component was identified on prior evaluations\"  Nuclear stress in 2021 - normal perfusion  CT coronary angiogram 3/2024 -   No stenosis.  Superficial myocardial bridging of a 2 cm segment of the mid LAD.  Small hiatal hernia.   Coronary vasospasm would be on the differential.  Sx's improved with norvasc.  Sx's may be from hiatal hernia, also a consideration.    # coronary calcification  Mild -  small amount of calcified plaque in left main.   Calcium score 2.   ASA, statin    # HTN    Valsartan 160 mg daily    Norvasc 5 mg daily  At goal today here.  But some high readings at home.  He will buy a new cuff and monitor.     # HLD  Lipitor 20 mg daily  Last .   See what next LDL is, consider ultimately adding zetia    # bradycardia  Reports HR as low as 47 bpm at times per apple watch (when resting)  He has a narrow QRS on EKG.  Can get HR to 160's with exercise.  No symptoms associated with low HR.  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     # migraines  Very rare  On triptan      Today's Plan Summary:  See above assessment/plan for full details of today's plan.  Briefly,     BP at goal today here.  But some high readings at home.  He will buy a new cuff and monitor.             Reason For Visit / Chief Complaint:  F/u -  hypertension, hyperlipidemia, chest pain    HPI:   Bola Sanders is a 73 y.o.  male with history as noted in the problem list and further detailed in the above assessment and plan.    Initial:   Nov 2023  Here to establish care with a new cardiologist  The patient has a history of HTN, HLD, and atypical chest pain with prior negative nuclear stress testing.  Last saw Dr Recio in April 2023 -  no " med changes at that time.  Today -  reports the chest pain is sharp.   Absolutely random in terms of when it occurs.   Last event was when walking in from garage.   Sat down and it went away.  Typically lasts about 15 minutes (only severe for a few seconds).    Occurs about 1 episode per month on average.   It is not exertional.  He is a serious road cyclist.  Never gets the chest pain with biking.  Can get HR up to 160 with exercise - no sx's with this.    Mild SOB since had covid - feels like lung capacity is a bit lower than previously.  Retired .  [employer].   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  Serious road cyclist.  Also likes mountain biking.   .  2 children.  Former smoker (quit in 1970s).    Interval:  Last visit -->   Called in with leg edema - visit moved up.   was at dinner party last week - retired doctor noted he had pitting leg edema.  Had done 18 mile bike ride that morning.    Patient hasn't really noticed any edema since that time.    Chest pain is doing well.      Plan last visit -->    Here today to discuss edema of the lower extremities that was noted by a physician at dinner party.  The edema is present but unimpressive.  JVP is not elevated.  Add compression socks.  Update echo.    Echo - 07/24/24   EF 60%  IVC normal in size    8-26 - stable CMP.   .      Today, still biking.   Gets a little chest pain at the very beginning of the ride but then keeps going and does long rides without any chest pain.  No SOB.   No significant edema.            Cardiac Imaging personally reviewed:  EKG 11-2-23  NSR at 60 bpm.   Holter or event monitor    Echo 2021  EF 60%    Echo - 07/24/24   EF 60%  IVC normal in size    .    JESSICA    Cardiac MRI    Stress testing Nuclear 2021 -  normal perfusion   Coronary CTA or [facility] CT coronary angiogram - 03/15/24   Small amount of calcified plaque in left main.    No significant coronary  stenosis.  Coronary calcium score - 2  Superficial myocardial bridging of a 2 cm segment of the mid LAD  Small hiatal hernia.       R[facility]    CPET            Patient Active Problem List    Diagnosis Date Noted    Coronary artery calcification 04/18/2024    Family history of pancreatic cancer 05/25/2023    History of COVID-19 01/23/2023    Chronic pain of both knees 02/16/2021    Bilateral primary osteoarthritis of knee 08/13/2020    ETD (Eustachian tube dysfunction), bilateral 08/21/2019    Sensorineural hearing loss (SNHL), bilateral 08/21/2019    Roberts's esophagus 02/13/2018    Chronic GERD 08/28/2017    Acquired hypothyroidism 11/17/2016    Osteoarthrosis, localized, primary, knee, right 10/13/2015    Essential hypertension 10/02/2015    Mixed hyperlipidemia 10/02/2015    Primary localized osteoarthritis of left knee 08/27/2015       Past Medical History:   Diagnosis Date    Acute non-recurrent maxillary sinusitis 04/15/2019    Allergic     Angina effort     Anxiety     Roberts esophagus 2000    Roberts's esophagus     Bleeding from left ear 04/26/2019    Chest pain 11/07/2019    Chest pain, unspecified     Colon polyp     Cough 02/13/2018    GERD (gastroesophageal reflux disease)     Glaucoma     Hernia 2014    HL (hearing loss) 10 years ago    Hyperlipidemia     Hypertension     Impacted cerumen of right ear 04/25/2019    Macular hole, right eye 01/28/2019    Last Assessment & Plan:  Impression:  1. Macular hole, right eye (dx 11/2018 ~295 microns, now 391 micron) 2. PP OD (10/2018) 3. Cataract OS   Plan: - Second opinion. Lives in [place] with plan for surgery at [facility]. - Advocated getting the surgery as the best way to fix the MH - Follow up as needed    Migraine     Mild depression 11/26/2019    Otorrhea, left 04/25/2019    SBO (small bowel obstruction) (HCC) 03/23/2019         Allergies   Allergen Reactions    Clindamycin GI Intolerance     Per Patient    Doxycycline Other (See Comments)     unknown    Influenza  Vaccines Other (See Comments)     Flu like symptoms     Nitrous Oxide        Current Outpatient Medications   Medication Instructions    amLODIPine (NORVASC) 5 mg, Oral, Daily    aspirin 81 MG tablet Oral, Daily    atorvastatin (LIPITOR) 20 mg, Oral, Daily    bupivacaine 0.25% - 20 mL, sodium chloride 0.9% inj - 20 mL, bupivacaine liposomal 1.3% (EXPAREL) - 20 mL     esomeprazole (NEXIUM) 20 mg, Oral, Daily (early morning)    latanoprost (XALATAN) 0.005 % ophthalmic solution 1 drop, Both Eyes, Daily at bedtime    levothyroxine 50 mcg, Oral, Daily, Take on an empty stomach    methocarbamol (ROBAXIN) 500 mg, Oral, 4 times daily, As needed    rizatriptan (MAXALT) 10 mg tablet TAKE 1 TABLET BY MOUTH ONCE AS NEEDED FOR MIGRAINE FOR UP TO ONE DOSE. MAY REPEAT IN TWO HOURS IF NEEDED    timolol (TIMOPTIC) 0.5 % ophthalmic solution 2 drops, Both Eyes, Daily    valsartan (DIOVAN) 160 mg, Oral, Daily       Social History     Socioeconomic History    Marital status: /Civil Union     Spouse name: Not on file    Number of children: Not on file    Years of education: Not on file    Highest education level: Not on file   Occupational History    Not on file   Tobacco Use    Smoking status: Former     Current packs/day: 0.00     Average packs/day: 0.3 packs/day for 10.0 years (2.5 ttl pk-yrs)     Types: Cigarettes     Start date: 1968     Quit date: 1978     Years since quittin.7     Passive exposure: Never    Smokeless tobacco: Never   Vaping Use    Vaping status: Never Used   Substance and Sexual Activity    Alcohol use: Yes     Alcohol/week: 4.0 standard drinks of alcohol     Types: 2 Glasses of wine, 2 Shots of liquor per week    Drug use: Never    Sexual activity: Yes     Partners: Female     Birth control/protection: None   Other Topics Concern    Not on file   Social History Narrative    Not on file     Social Determinants of Health     Financial Resource Strain: Low Risk  (2022)    Overall Financial  Resource Strain (CARDIA)     Difficulty of Paying Living Expenses: Not hard at all   Food Insecurity: No Food Insecurity (4/16/2024)    Hunger Vital Sign     Worried About Running Out of Food in the Last Year: Never true     Ran Out of Food in the Last Year: Never true   Transportation Needs: No Transportation Needs (4/16/2024)    PRAPARE - Transportation     Lack of Transportation (Medical): No     Lack of Transportation (Non-Medical): No   Physical Activity: Not on file   Stress: Not on file   Social Connections: Not on file   Intimate Partner Violence: Not on file   Housing Stability: Low Risk  (4/16/2024)    Housing Stability Vital Sign     Unable to Pay for Housing in the Last Year: No     Number of Times Moved in the Last Year: 1     Homeless in the Last Year: No       Family History   Problem Relation Age of Onset    No Known Problems Mother     Heart attack Father         age 83    Heart disease Father     Hearing loss Father     Pancreatic cancer Brother     Stroke Paternal Grandmother     Breast cancer Paternal Aunt     Esophageal cancer Paternal Aunt     Heart attack Paternal Uncle        Physical Exam:  There were no vitals taken for this visit.  There is no height or weight on file to calculate BMI.  Wt Readings from Last 3 Encounters:   07/24/24 69.4 kg (153 lb)   06/24/24 69.7 kg (153 lb 9.6 oz)   04/26/24 69.9 kg (154 lb)     Physical Exam  Vitals reviewed.   Constitutional:       General: He is not in acute distress.     Appearance: He is not toxic-appearing.   Neck:      Comments: JVP is not elevated  Cardiovascular:      Rate and Rhythm: Normal rate and regular rhythm.      Heart sounds: No murmur heard.     No friction rub. No gallop.   Pulmonary:      Breath sounds: Normal breath sounds. No wheezing, rhonchi or rales.   Musculoskeletal:      Comments: trace LE edema   Neurological:      Mental Status: He is alert.         Labs & Results:  Lab Results   Component Value Date    SODIUM 138  08/26/2024    K 4.5 08/26/2024     08/26/2024    CO2 27 08/26/2024    BUN 19 08/26/2024    CREATININE 1.04 08/26/2024    GLUC 111 03/24/2019    CALCIUM 9.2 08/26/2024         Thank you for the opportunity to participate in the care of this patient.    Jesus Stephens MD, [facility]  Advanced Heart Failure and Transplant Cardiologist  Director of Cardio-Oncology  [facility]

## 2024-09-26 ENCOUNTER — DOCUMENTATION (OUTPATIENT)
Dept: CARDIOLOGY CLINIC | Facility: CLINIC | Age: 74
End: 2024-09-26

## 2024-09-26 ENCOUNTER — TELEPHONE (OUTPATIENT)
Dept: CARDIOLOGY CLINIC | Facility: CLINIC | Age: 74
End: 2024-09-26

## 2024-09-26 DIAGNOSIS — I10 ESSENTIAL HYPERTENSION: ICD-10-CM

## 2024-09-26 RX ORDER — VALSARTAN 320 MG/1
320 TABLET ORAL DAILY
Qty: 90 TABLET | Refills: 3 | Status: SHIPPED | OUTPATIENT
Start: 2024-09-26

## 2024-09-26 NOTE — PROGRESS NOTES
Chart update:    The patient called in with statistical analysis of his blood pressures and the average systolic blood pressure is in the high 130s.    Plan:  Increase valsartan to 320 mg daily  BMP 1 week.

## 2024-09-26 NOTE — TELEPHONE ENCOUNTER
LVOM for pt regarding below. I left my direct TEAMS number for any follow up questions or concerns.    -Jeramy Mckinley RN

## 2024-09-26 NOTE — TELEPHONE ENCOUNTER
----- Message from Jesus Stephens MD sent at 9/26/2024 12:44 PM EDT -----      Jeramy,    See my note.  Can you connect with the patient to convey my plan.  Increasing valsartan and BMP in 1 week.  (I did place the orders for the medication and BMP).    Thanks  MDM

## 2024-09-27 ENCOUNTER — LAB (OUTPATIENT)
Dept: LAB | Age: 74
End: 2024-09-27
Payer: COMMERCIAL

## 2024-09-27 DIAGNOSIS — I10 ESSENTIAL HYPERTENSION: ICD-10-CM

## 2024-09-27 LAB
ANION GAP SERPL CALCULATED.3IONS-SCNC: 4 MMOL/L (ref 4–13)
BUN SERPL-MCNC: 16 MG/DL (ref 5–25)
CALCIUM SERPL-MCNC: 9.4 MG/DL (ref 8.4–10.2)
CHLORIDE SERPL-SCNC: 101 MMOL/L (ref 96–108)
CO2 SERPL-SCNC: 33 MMOL/L (ref 21–32)
CREAT SERPL-MCNC: 0.96 MG/DL (ref 0.6–1.3)
GFR SERPL CREATININE-BSD FRML MDRD: 77 ML/MIN/1.73SQ M
GLUCOSE P FAST SERPL-MCNC: 82 MG/DL (ref 65–99)
POTASSIUM SERPL-SCNC: 4.6 MMOL/L (ref 3.5–5.3)
SODIUM SERPL-SCNC: 138 MMOL/L (ref 135–147)

## 2024-09-27 PROCEDURE — 80048 BASIC METABOLIC PNL TOTAL CA: CPT

## 2024-09-27 PROCEDURE — 36415 COLL VENOUS BLD VENIPUNCTURE: CPT

## 2024-10-16 ENCOUNTER — PROBLEM (OUTPATIENT)
Dept: URBAN - METROPOLITAN AREA CLINIC 6 | Facility: CLINIC | Age: 74
End: 2024-10-16

## 2024-10-16 DIAGNOSIS — H40.1132: ICD-10-CM

## 2024-10-16 PROCEDURE — 99213 OFFICE O/P EST LOW 20 MIN: CPT

## 2024-10-16 ASSESSMENT — TONOMETRY
OD_IOP_MMHG: 26
OS_IOP_MMHG: 22

## 2024-10-16 ASSESSMENT — VISUAL ACUITY
OD_SC: 20/25-2
OS_SC: 20/20-1

## 2024-10-23 ENCOUNTER — TELEPHONE (OUTPATIENT)
Age: 74
End: 2024-10-23

## 2024-10-23 ENCOUNTER — DOCUMENTATION (OUTPATIENT)
Age: 74
End: 2024-10-23

## 2024-10-23 ENCOUNTER — OFFICE VISIT (OUTPATIENT)
Dept: FAMILY MEDICINE CLINIC | Facility: CLINIC | Age: 74
End: 2024-10-23
Payer: COMMERCIAL

## 2024-10-23 VITALS
BODY MASS INDEX: 29.04 KG/M2 | OXYGEN SATURATION: 98 % | DIASTOLIC BLOOD PRESSURE: 70 MMHG | HEART RATE: 60 BPM | RESPIRATION RATE: 16 BRPM | WEIGHT: 153.8 LBS | SYSTOLIC BLOOD PRESSURE: 122 MMHG | HEIGHT: 61 IN | TEMPERATURE: 97.3 F

## 2024-10-23 DIAGNOSIS — E03.9 ACQUIRED HYPOTHYROIDISM: ICD-10-CM

## 2024-10-23 DIAGNOSIS — Z12.5 SCREENING FOR PROSTATE CANCER: ICD-10-CM

## 2024-10-23 DIAGNOSIS — E78.2 MIXED HYPERLIPIDEMIA: ICD-10-CM

## 2024-10-23 DIAGNOSIS — I10 ESSENTIAL HYPERTENSION: Primary | ICD-10-CM

## 2024-10-23 PROCEDURE — 99214 OFFICE O/P EST MOD 30 MIN: CPT | Performed by: FAMILY MEDICINE

## 2024-10-23 PROCEDURE — G2211 COMPLEX E/M VISIT ADD ON: HCPCS | Performed by: FAMILY MEDICINE

## 2024-10-23 RX ORDER — HYDROCHLOROTHIAZIDE 25 MG/1
25 TABLET ORAL DAILY
Qty: 30 TABLET | Refills: 11 | Status: SHIPPED | OUTPATIENT
Start: 2024-10-23

## 2024-10-23 RX ORDER — ACETAMINOPHEN AND DIPHENHYDRAMINE HCL 500; 25 MG/1; MG/1
TABLET, FILM COATED ORAL DAILY PRN
COMMUNITY
Start: 2024-08-01

## 2024-10-23 NOTE — PROGRESS NOTES
Chart update:    The patient called and his blood pressure still above goal.  Will start hydrochlorothiazide 25 mg daily and check BMP in 2 weeks.

## 2024-10-23 NOTE — TELEPHONE ENCOUNTER
Spoke to patient regarding new HCTZ Rx + blood work needed 1-2 after starting prescription. Pt verbalized understanding but wondered if he should go back to his previous Valsartan dose of 160 mg since going up to 320 mg did not have much benefit.    Please advise.

## 2024-10-23 NOTE — PROGRESS NOTES
"Ambulatory Visit  Name: Bola Sanders      : 1950      MRN: [id number]  Encounter Provider: Estefania Minor DO  Encounter Date: 10/23/2024   Encounter department: [facility]    Assessment & Plan  Essential hypertension  Stable on current meds  Orders:    CBC; Future    Comprehensive metabolic panel; Future    Mixed hyperlipidemia    Orders:    Lipid Panel with Direct LDL reflex; Future    Acquired hypothyroidism    Orders:    TSH, 3rd generation with Free T4 reflex; Future    Screening for prostate cancer    Orders:    PSA, Total Screen; Future         History of Present Illness     History of Present Illness  Here for follow up  Bp's have been great at doctors offices  Biking 30 miles  70 miles per week  Doing small weight at home       Review of Systems  Objective     /70 (BP Location: Left arm, Patient Position: Sitting, Cuff Size: Standard)   Pulse 60   Temp (!) 97.3 °F (36.3 °C) (Tympanic)   Resp 16   Ht 5' 1\" (1.549 m)   Wt 69.8 kg (153 lb 12.8 oz)   SpO2 98%   BMI 29.06 kg/m²     Physical Exam    Physical Exam  Vitals and nursing note reviewed.   Constitutional:       Appearance: He is well-developed.   HENT:      Head: Normocephalic and atraumatic.      Right Ear: External ear normal.      Left Ear: External ear normal.      Nose: Nose normal.   Eyes:      Extraocular Movements: Extraocular movements intact.      Conjunctiva/sclera: Conjunctivae normal.      Pupils: Pupils are equal, round, and reactive to light.   Cardiovascular:      Rate and Rhythm: Normal rate and regular rhythm.      Heart sounds: Normal heart sounds.   Pulmonary:      Effort: Pulmonary effort is normal.      Breath sounds: Normal breath sounds.   Abdominal:      General: Abdomen is flat. Bowel sounds are normal.      Palpations: Abdomen is soft.   Musculoskeletal:         General: Normal range of motion.      Cervical back: Normal range of motion and neck supple.   Skin:     General: Skin is " warm and dry.      Capillary Refill: Capillary refill takes less than 2 seconds.   Neurological:      General: No focal deficit present.      Mental Status: He is alert and oriented to person, place, and time.   Psychiatric:         Mood and Affect: Mood normal.         Behavior: Behavior normal.         Thought Content: Thought content normal.         Judgment: Judgment normal.

## 2024-11-08 ENCOUNTER — OFFICE VISIT (OUTPATIENT)
Dept: OBGYN CLINIC | Facility: MEDICAL CENTER | Age: 74
End: 2024-11-08
Payer: COMMERCIAL

## 2024-11-08 VITALS
SYSTOLIC BLOOD PRESSURE: 138 MMHG | BODY MASS INDEX: 28.89 KG/M2 | HEART RATE: 88 BPM | DIASTOLIC BLOOD PRESSURE: 78 MMHG | WEIGHT: 153 LBS | HEIGHT: 61 IN

## 2024-11-08 DIAGNOSIS — M17.0 BILATERAL PRIMARY OSTEOARTHRITIS OF KNEE: Primary | ICD-10-CM

## 2024-11-08 DIAGNOSIS — M25.561 ARTHRALGIA OF BOTH KNEES: ICD-10-CM

## 2024-11-08 DIAGNOSIS — M76.891 HAMSTRING TENDONITIS OF RIGHT THIGH: ICD-10-CM

## 2024-11-08 DIAGNOSIS — M25.562 ARTHRALGIA OF BOTH KNEES: ICD-10-CM

## 2024-11-08 PROCEDURE — 20610 DRAIN/INJ JOINT/BURSA W/O US: CPT | Performed by: ORTHOPAEDIC SURGERY

## 2024-11-08 PROCEDURE — 99213 OFFICE O/P EST LOW 20 MIN: CPT | Performed by: ORTHOPAEDIC SURGERY

## 2024-11-08 RX ORDER — BUPIVACAINE HYDROCHLORIDE 2.5 MG/ML
2 INJECTION, SOLUTION INFILTRATION; PERINEURAL
Status: COMPLETED | OUTPATIENT
Start: 2024-11-08 | End: 2024-11-08

## 2024-11-08 RX ORDER — LIDOCAINE HYDROCHLORIDE 10 MG/ML
2 INJECTION, SOLUTION INFILTRATION; PERINEURAL
Status: COMPLETED | OUTPATIENT
Start: 2024-11-08 | End: 2024-11-08

## 2024-11-08 RX ORDER — BETAMETHASONE SODIUM PHOSPHATE AND BETAMETHASONE ACETATE 3; 3 MG/ML; MG/ML
12 INJECTION, SUSPENSION INTRA-ARTICULAR; INTRALESIONAL; INTRAMUSCULAR; SOFT TISSUE
Status: COMPLETED | OUTPATIENT
Start: 2024-11-08 | End: 2024-11-08

## 2024-11-08 RX ADMIN — BUPIVACAINE HYDROCHLORIDE 2 ML: 2.5 INJECTION, SOLUTION INFILTRATION; PERINEURAL at 14:00

## 2024-11-08 RX ADMIN — BETAMETHASONE SODIUM PHOSPHATE AND BETAMETHASONE ACETATE 12 MG: 3; 3 INJECTION, SUSPENSION INTRA-ARTICULAR; INTRALESIONAL; INTRAMUSCULAR; SOFT TISSUE at 14:00

## 2024-11-08 RX ADMIN — LIDOCAINE HYDROCHLORIDE 2 ML: 10 INJECTION, SOLUTION INFILTRATION; PERINEURAL at 14:00

## 2024-11-08 NOTE — PATIENT INSTRUCTIONS
1. Bilateral primary osteoarthritis of knee  Large joint arthrocentesis: bilateral knee      2. Arthralgia of both knees  Large joint arthrocentesis: bilateral knee      3. Hamstring tendonitis of right thigh          Hamstring stretching as demonstrated in the office today    Return in about 3 months (around 2/8/2025) for re-check, may reschedule if doing well.

## 2024-11-08 NOTE — PROGRESS NOTES
Assessment:   Diagnosis ICD-10-CM Associated Orders   1. Bilateral primary osteoarthritis of knee  M17.0 Large joint arthrocentesis: bilateral knee      2. Arthralgia of both knees  M25.561 Large joint arthrocentesis: bilateral knee    M25.562       3. Hamstring tendonitis of right thigh  M76.891           Plan:  Diagnosis, treatment options and associated risks were discussed with the patient including no treatment, nonsurgical treatment and potential for surgical intervention.  The patient was given the opportunity to ask questions regarding each.   It was explained to the patient that based upon the clinical and radiographic findings, at this point in time, this is not a surgical problem.  Treatment for the above diagnoses and associated symptoms of this nature is generally conservative including a physician directed physical therapy program, improved fitness/weight loss, anti-inflammatories, and potentially various injections.  Educated the importance of hamstring stretching as demonstrated in the office today.  He was offered, accepted, performed injections of cortisone to both knee joints today for symptomatic relief.  He tolerated both injections well.  Ice and postinjection protocol advised.  Weightbearing and activity as tolerated.  Discussed repeating the injections of cortisone every 3 months as needed versus possible viscosupplementation    To do next visit:  Return in about 3 months (around 2/8/2025) for re-check, may reschedule if doing well.    The above stated was discussed in layman's terms and the patient expressed understanding.  All questions were answered to the patient's satisfaction.       Scribe Attestation      I,:  Gabe Clark am acting as a scribe while in the presence of the attending physician.:       I,:  Bob Deleon MD personally performed the services described in this documentation    as scribed in my presence.:               Subjective:   Bola Sanders is a 73 y.o. male  who presents today for repeat evaluation of his bilateral knees due to return of pain, right worse than left.  He was seen April of this year and responded favorably to injections of cortisone to both knees which were administered at that time.  He still has posterior lateral distal thigh discomfort especially going up stairs leading with his right leg.  He has a history of right lateral tibial plateau fracture treated nonoperatively only a few years ago.  He does enjoy bike riding and does anywhere between 10 to 15 miles a day.  He also has discomfort anteriorly on descending stairs.  He denies any mechanical symptoms such as locking and/or catching.      Review of systems negative unless otherwise specified in HPI  Review of Systems    Past Medical History:   Diagnosis Date    Acute non-recurrent maxillary sinusitis 04/15/2019    Allergic     Angina effort     Anxiety     Roberts esophagus 2000    Roberts's esophagus     Bleeding from left ear 04/26/2019    Chest pain 11/07/2019    Chest pain, unspecified     Colon polyp     Cough 02/13/2018    GERD (gastroesophageal reflux disease)     Glaucoma     Hernia 2014    HL (hearing loss) 10 years ago    Hyperlipidemia     Hypertension     Impacted cerumen of right ear 04/25/2019    Macular hole, right eye 01/28/2019    Last Assessment & Plan:  Impression:  1. Macular hole, right eye (dx 11/2018 ~295 microns, now 391 micron) 2. PP OD (10/2018) 3. Cataract OS   Plan: - Second opinion. Lives in [place] with plan for surgery at [facility]. - Advocated getting the surgery as the best way to fix the MH - Follow up as needed    Migraine     Mild depression 11/26/2019    Otorrhea, left 04/25/2019    SBO (small bowel obstruction) ([facility]) 03/23/2019       Past Surgical History:   Procedure Laterality Date    CATARACT EXTRACTION      COLONOSCOPY  2022    EYE SURGERY      HERNIA REPAIR Right 2014    UPPER GASTROINTESTINAL ENDOSCOPY         Family History   Problem Relation Age of Onset    No  Known Problems Mother     Heart attack Father         age 83    Heart disease Father     Hearing loss Father     Pancreatic cancer Brother     Stroke Paternal Grandmother     Breast cancer Paternal Aunt     Esophageal cancer Paternal Aunt     Heart attack Paternal Uncle        Social History     Occupational History    Not on file   Tobacco Use    Smoking status: Former     Current packs/day: 0.00     Average packs/day: 0.3 packs/day for 10.0 years (2.5 ttl pk-yrs)     Types: Cigarettes     Start date: 1968     Quit date: 1978     Years since quittin.8     Passive exposure: Never    Smokeless tobacco: Never   Vaping Use    Vaping status: Never Used   Substance and Sexual Activity    Alcohol use: Yes     Alcohol/week: 4.0 standard drinks of alcohol     Types: 2 Glasses of wine, 2 Shots of liquor per week    Drug use: Never    Sexual activity: Yes     Partners: Female     Birth control/protection: None         Current Outpatient Medications:     amLODIPine (NORVASC) 5 mg tablet, Take 1 tablet (5 mg total) by mouth daily, Disp: 90 tablet, Rfl: 3    aspirin 81 MG tablet, Take by mouth daily, Disp: , Rfl:     atorvastatin (LIPITOR) 20 mg tablet, Take 1 tablet (20 mg total) by mouth daily, Disp: 90 tablet, Rfl: 3    Cholecalciferol (D3-1000) 1,000 units tablet, Take 1,000 Units by mouth daily, Disp: , Rfl:     EQL Natural Zinc 50 MG TABS, Take by mouth daily as needed, Disp: , Rfl:     esomeprazole (NexIUM) 20 mg capsule, Take 1 capsule (20 mg total) by mouth daily in the early morning, Disp: 90 capsule, Rfl: 3    latanoprost (XALATAN) 0.005 % ophthalmic solution, Administer 1 drop to both eyes daily at bedtime, Disp: , Rfl:     levothyroxine 50 mcg tablet, TAKE 1 TABLET BY MOUTH EVERY DAY ON AN EMPTY STOMACH, Disp: 90 tablet, Rfl: 1    methocarbamol (ROBAXIN) 500 mg tablet, Take 1 tablet (500 mg total) by mouth 4 (four) times a day As needed, Disp: 60 tablet, Rfl: 2    rizatriptan (MAXALT) 10 mg tablet, TAKE  "1 TABLET BY MOUTH ONCE AS NEEDED FOR MIGRAINE FOR UP TO ONE DOSE. MAY REPEAT IN TWO HOURS IF NEEDED, Disp: 9 tablet, Rfl: 0    timolol (TIMOPTIC) 0.5 % ophthalmic solution, Administer 2 drops to both eyes daily, Disp: , Rfl:     valsartan (DIOVAN) 320 MG tablet, Take 1 tablet (320 mg total) by mouth daily, Disp: 90 tablet, Rfl: 3    hydroCHLOROthiazide 25 mg tablet, Take 1 tablet (25 mg total) by mouth daily, Disp: 30 tablet, Rfl: 11    Allergies   Allergen Reactions    Clindamycin GI Intolerance     Per Patient    Doxycycline Other (See Comments)     unknown    Influenza Vaccines Other (See Comments)     Flu like symptoms     Nitrous Oxide             Vitals:    11/08/24 1403   BP: 138/78   Pulse: 88       Body mass index is 28.91 kg/m².  Wt Readings from Last 3 Encounters:   11/08/24 69.4 kg (153 lb)   10/23/24 69.8 kg (153 lb 12.8 oz)   09/19/24 69 kg (152 lb 3.2 oz)       Objective:                    Right Knee Exam     Muscle Strength   The patient has normal right knee strength.    Range of Motion   Extension:  0   Flexion:  130 (trace crepitation)     Other   Erythema: absent  Sensation: normal  Swelling: mild  Effusion: no effusion present      Left Knee Exam     Muscle Strength   The patient has normal left knee strength.    Range of Motion   Extension:  0   Flexion:  130 (trace crepitation)     Other   Erythema: absent  Sensation: normal  Swelling: mild  Effusion: no effusion present            Diagnostics, reviewed and taken today if performed as documented:    None performed            Procedures, if performed today:    Large joint arthrocentesis: bilateral knee  Universal Protocol:  Consent: Verbal consent obtained.  Risks and benefits: risks, benefits and alternatives were discussed  Consent given by: patient  Time out: Immediately prior to procedure a \"time out\" was called to verify the correct patient, procedure, equipment, support staff and site/side marked as required.  Timeout called at: " "11/8/2024 2:45 PM.  Patient understanding: patient states understanding of the procedure being performed  Site marked: the operative site was marked  Patient identity confirmed: verbally with patient  Supporting Documentation  Indications: pain and diagnostic evaluation   Procedure Details  Location: knee - bilateral knee  Preparation: Patient was prepped and draped in the usual sterile fashion  Needle size: 22 G  Ultrasound guidance: no  Approach: anterolateral    Medications (Right): 2 mL bupivacaine 0.25 %; 2 mL lidocaine 1 %; 12 mg betamethasone acetate-betamethasone sodium phosphate 6 (3-3) mg/mLMedications (Left): 2 mL bupivacaine 0.25 %; 2 mL lidocaine 1 %; 12 mg betamethasone acetate-betamethasone sodium phosphate 6 (3-3) mg/mL   Patient tolerance: patient tolerated the procedure well with no immediate complications  Dressing:  Sterile dressing applied              Portions of the record may have been created with voice recognition software.  Occasional wrong word or \"sound a like\" substitutions may have occurred due to the inherent limitations of voice recognition software.  Read the chart carefully and recognize, using context, where substitutions have occurred.  "

## 2024-11-14 ENCOUNTER — IOP CHECK (OUTPATIENT)
Dept: URBAN - METROPOLITAN AREA CLINIC 6 | Facility: CLINIC | Age: 74
End: 2024-11-14

## 2024-11-14 DIAGNOSIS — H40.1132: ICD-10-CM

## 2024-11-14 PROCEDURE — 92012 INTRM OPH EXAM EST PATIENT: CPT

## 2024-11-14 ASSESSMENT — TONOMETRY
OD_IOP_MMHG: 26
OS_IOP_MMHG: 25

## 2024-11-14 ASSESSMENT — VISUAL ACUITY
OU_CC: J1
OD_SC: 20/30-2
OS_SC: 20/25-1

## 2024-12-06 ENCOUNTER — APPOINTMENT (OUTPATIENT)
Dept: LAB | Age: 74
End: 2024-12-06
Payer: COMMERCIAL

## 2024-12-06 ENCOUNTER — RESULTS FOLLOW-UP (OUTPATIENT)
Dept: CARDIOLOGY CLINIC | Facility: CLINIC | Age: 74
End: 2024-12-06

## 2024-12-06 DIAGNOSIS — E87.1 HYPONATREMIA: Primary | ICD-10-CM

## 2024-12-06 DIAGNOSIS — I10 ESSENTIAL HYPERTENSION: ICD-10-CM

## 2024-12-06 LAB
ANION GAP SERPL CALCULATED.3IONS-SCNC: 7 MMOL/L (ref 4–13)
BUN SERPL-MCNC: 19 MG/DL (ref 5–25)
CALCIUM SERPL-MCNC: 9.9 MG/DL (ref 8.4–10.2)
CHLORIDE SERPL-SCNC: 93 MMOL/L (ref 96–108)
CO2 SERPL-SCNC: 31 MMOL/L (ref 21–32)
CREAT SERPL-MCNC: 1.02 MG/DL (ref 0.6–1.3)
GFR SERPL CREATININE-BSD FRML MDRD: 71 ML/MIN/1.73SQ M
GLUCOSE P FAST SERPL-MCNC: 95 MG/DL (ref 65–99)
POTASSIUM SERPL-SCNC: 3.6 MMOL/L (ref 3.5–5.3)
SODIUM SERPL-SCNC: 131 MMOL/L (ref 135–147)

## 2024-12-06 PROCEDURE — 80048 BASIC METABOLIC PNL TOTAL CA: CPT

## 2024-12-06 PROCEDURE — 36415 COLL VENOUS BLD VENIPUNCTURE: CPT

## 2024-12-06 NOTE — RESULT ENCOUNTER NOTE
BMP (s/p starting HCTZ) -  new mild hyponatremia.    Jeramy - Can you let the patient know labs show new mild hyponatremia.  Likely from HCTZ.    Stay hydrated.   Repeat BMP 2 weeks.

## 2024-12-09 ENCOUNTER — IOP CHECK (OUTPATIENT)
Dept: URBAN - METROPOLITAN AREA CLINIC 6 | Facility: CLINIC | Age: 74
End: 2024-12-09

## 2024-12-09 DIAGNOSIS — H40.1132: ICD-10-CM

## 2024-12-09 PROCEDURE — 92012 INTRM OPH EXAM EST PATIENT: CPT

## 2024-12-09 ASSESSMENT — VISUAL ACUITY
OD_SC: 20/40
OS_SC: 20/25

## 2024-12-09 ASSESSMENT — TONOMETRY
OD_IOP_MMHG: 20
OS_IOP_MMHG: 20

## 2024-12-09 NOTE — TELEPHONE ENCOUNTER
----- Message from Jesus Stephens MD sent at 12/6/2024  1:02 PM EST -----    BMP (s/p starting HCTZ) -  new mild hyponatremia.    Jeramy - Can you let the patient know labs show new mild hyponatremia.  Likely from HCTZ.    Stay hydrated.   Repeat BMP 2 weeks.

## 2024-12-22 ENCOUNTER — APPOINTMENT (OUTPATIENT)
Dept: LAB | Age: 74
End: 2024-12-22
Payer: COMMERCIAL

## 2024-12-22 DIAGNOSIS — E87.1 HYPONATREMIA: ICD-10-CM

## 2024-12-22 LAB
ANION GAP SERPL CALCULATED.3IONS-SCNC: 9 MMOL/L (ref 4–13)
BUN SERPL-MCNC: 13 MG/DL (ref 5–25)
CALCIUM SERPL-MCNC: 9.4 MG/DL (ref 8.4–10.2)
CHLORIDE SERPL-SCNC: 92 MMOL/L (ref 96–108)
CO2 SERPL-SCNC: 31 MMOL/L (ref 21–32)
CREAT SERPL-MCNC: 1.01 MG/DL (ref 0.6–1.3)
GFR SERPL CREATININE-BSD FRML MDRD: 71 ML/MIN/1.73SQ M
GLUCOSE P FAST SERPL-MCNC: 94 MG/DL (ref 65–99)
POTASSIUM SERPL-SCNC: 3.8 MMOL/L (ref 3.5–5.3)
SODIUM SERPL-SCNC: 132 MMOL/L (ref 135–147)

## 2024-12-22 PROCEDURE — 36415 COLL VENOUS BLD VENIPUNCTURE: CPT

## 2024-12-22 PROCEDURE — 80048 BASIC METABOLIC PNL TOTAL CA: CPT

## 2024-12-23 ENCOUNTER — DOCUMENTATION (OUTPATIENT)
Dept: CARDIOLOGY CLINIC | Facility: CLINIC | Age: 74
End: 2024-12-23

## 2024-12-23 NOTE — PROGRESS NOTES
Chart update:    The mild hyponatremia persists.  For safety we will stop HCTZ.  I did discuss with the patient adding a different agent but he wants to get a few weeks of data and only add an additional agent if BP remains above goal.    Thinking about future options -- carvedilol would not be ideal because of his baseline lower heart rate.  Considerations for next step would include eplerenone or hydralazine.

## 2025-01-13 DIAGNOSIS — I10 ESSENTIAL HYPERTENSION: ICD-10-CM

## 2025-01-13 RX ORDER — VALSARTAN 320 MG/1
320 TABLET ORAL DAILY
Qty: 90 TABLET | Refills: 3 | Status: SHIPPED | OUTPATIENT
Start: 2025-01-13 | End: 2025-01-21

## 2025-01-21 DIAGNOSIS — I10 ESSENTIAL HYPERTENSION: ICD-10-CM

## 2025-01-21 RX ORDER — VALSARTAN 160 MG/1
160 TABLET ORAL DAILY
Qty: 90 TABLET | Refills: 3 | Status: SHIPPED | OUTPATIENT
Start: 2025-01-21

## 2025-02-05 NOTE — PROGRESS NOTES
Assessment/Plan:    Hypertension  Hypertension, stable  We will continue the same medications  Mixed hyperlipidemia  Hyperlipidemia, stable  Patient will continue same medications  Diagnoses and all orders for this visit:    Essential hypertension  -     POCT ECG    Mixed hyperlipidemia          Subjective:  Feels fairly well  Patient ID: Corrina Montemayor is a 77 y o  male  The patient presented to this office for the purpose of cardiac re-evaluation in anticipation of eye surgery  He has been feeling otherwise well denying any symptoms of chest pain, shortness of breath, palpitation, dizziness or lightheadedness  He has no leg edema  The following portions of the patient's history were reviewed and updated as appropriate: allergies, current medications, past family history, past medical history, past social history, past surgical history and problem list     Review of Systems   Respiratory: Negative for apnea, cough, chest tightness, shortness of breath and wheezing  Cardiovascular: Negative for chest pain, palpitations and leg swelling  Gastrointestinal: Negative for abdominal pain  Neurological: Negative for dizziness and light-headedness  Objective:  Stable cardiac-wise  /80 (BP Location: Right arm, Patient Position: Sitting)   Pulse 65   Resp 18   Ht 5' 2" (1 575 m)   Wt 65 7 kg (144 lb 12 8 oz)   BMI 26 48 kg/m²          Physical Exam   Constitutional: He is oriented to person, place, and time  He appears well-developed and well-nourished  No distress  HENT:   Head: Normocephalic  Eyes: Pupils are equal, round, and reactive to light  Neck: Normal range of motion  No JVD present  No thyromegaly present  Cardiovascular: Normal rate, regular rhythm, S1 normal and S2 normal   Exam reveals no gallop and no friction rub  Murmur heard     Crescendo systolic murmur is present with a grade of 1/6   Pulmonary/Chest: Effort normal and breath sounds normal  Patient : Ora Gomes Age: 30 year old Sex: female   MRN: [id number] Encounter Date: 2/5/2025      History     Chief Complaint   Patient presents with    Musculoskeletal Problem    Vomiting       Musculoskeletal Problem     Vomiting     30-year-old female past med history including anxiety presents concern for vomiting, body aches.    The patient reports that she has been feeling sick since around 2 AM this morning.  Reports that she woke up had several episodes of vomiting.  Yesterday before going to sleep she was feeling okay.  She reports her son is sick with a sore throat, fevers at home as well.  She denies any other recent sick contacts.  Denies any associated abdominal pain.  She is not taking medication prior to arrival here.  From chart review, the patient had a CT scan abdomen pelvis done yesterday, this is done to follow-up on abdominal wall seroma.  She is not otherwise having any abdominal pain at this time.      No Known Allergies    Discharge Medication List as of 2/5/2025  7:24 AM        Prior to Admission Medications    Details   sertraline (ZOLOFT) 100 MG tablet Take 1 tablet by mouth daily.Eprescribe, Disp-30 tablet, R-1      busPIRone (BUSPAR) 7.5 MG tablet Take 1 tablet by mouth in the morning and 1 tablet in the evening.Eprescribe, Disp-60 tablet, R-1      albuterol 108 (90 Base) MCG/ACT inhaler Inhale 2 puffs into the lungs every 6 hours as needed for Shortness of Breath or Wheezing.Eprescribe, Disp-2 each, R-1      ibuprofen (MOTRIN) 200 MG tablet Take 3 tablets by mouth every 6 hours as needed for Pain.OTC, Disp-30 tablet, R-0      acetaminophen (TYLENOL) 325 MG tablet Take 2 tablets by mouth every 6 hours as needed for Pain.OTC           New Prescriptions    Details   ondansetron (ZOFRAN ODT) 4 MG disintegrating tablet Place 1 tablet onto the tongue every 6 hours.Eprescribe, Disp-10 tablet, R-0             Past Medical History:   Diagnosis Date    Anxiety     Asthma (CMD)     Depression      Ectopic pregnancy, tubal (CMD)     History of chlamydia     PMH of     mood swings    Positive GBS test 10/31/2023    RAD (reactive airway disease) (CMD)     Supervision of other normal pregnancy, antepartum (CMD) 04/20/2023    FOB Gabe Murray  MRSA no  Chicken pox-vaccinated  Employer  at gas station  Abuse denies      Vaginal delivery (CMD) 08/01/2015    Vaginal itching 10/04/2022    Last Assessment & Plan:   Formatting of this note might be different from the original.  Symptoms are similar to previous yeast infections. Will treat presumptively with Fluconazole 150 mg once.  Testing for yeast, bacterial vaginosis, and trichomonas pending.  Will notify patient of results.  Recheck if not improved with treatment.         Past Surgical History:   Procedure Laterality Date    ECTOPIC PREGNANCY SURGERY Left 01/01/2014    HB TUBAL LIGATION POST PARTUM Bilateral 11/17/2023    UMBILICAL HERNIA REPAIR  11/26/2024    umbilical hernia repair with mesh-Dr Herron       Family History   Problem Relation Age of Onset    Diabetes Mother     Depression Mother     Hypertension Mother     Anxiety disorder Father        Social History     Tobacco Use    Smoking status: Never     Passive exposure: Never    Smokeless tobacco: Never   Vaping Use    Vaping status: never used   Substance Use Topics    Alcohol use: No     Alcohol/week: 0.0 standard drinks of alcohol    Drug use: Yes     Frequency: 7.0 times per week     Types: Marijuana     Comment: Today 12/9/2024       E-cigarette/Vaping    E-Cigarette/Vaping Use Never Used      E-Cigarette/Vaping Substances & Devices       Review of Systems    Physical Exam     ED Triage Vitals [02/05/25 0620]   ED Triage Vitals Group      Temp 98 °F (36.7 °C)      Heart Rate 62      Resp 16      /70      SpO2 100 %      EtCO2 mmHg       Height       Weight       Weight Scale Used Scale in bed      BMI (Calculated)       IBW/kg (Calculated)        Physical Exam  Constitutional:       No respiratory distress  He has no wheezes  He has no rales  He exhibits no tenderness  Abdominal: Soft  Musculoskeletal: Normal range of motion  He exhibits no edema, tenderness or deformity  Neurological: He is alert and oriented to person, place, and time  Skin: Skin is warm and dry  He is not diaphoretic  Psychiatric: He has a normal mood and affect  Vitals reviewed   Appearance: Normal appearance.   HENT:      Head: Normocephalic.      Nose: No congestion.   Cardiovascular:      Rate and Rhythm: Normal rate and regular rhythm.   Pulmonary:      Effort: Pulmonary effort is normal.      Breath sounds: Normal breath sounds.   Abdominal:      General: Abdomen is flat.      Palpations: Abdomen is soft.      Tenderness: There is no abdominal tenderness.   Musculoskeletal:         General: No swelling or tenderness.   Neurological:      Mental Status: She is alert. Mental status is at baseline.         ED Course     Procedures    Lab Results     Results for orders placed or performed during the hospital encounter of 02/05/25   Light Blue Top    Specimen: Blood, Venous   Result Value Ref Range    Extra Tube Hold for Add Ons    Light Green Top    Specimen: Blood, Venous   Result Value Ref Range    Extra Tube Hold for Add Ons    Lavender Top    Specimen: Blood, Venous   Result Value Ref Range    Extra Tube Hold for Add Ons    Gold Top    Specimen: Blood, Venous   Result Value Ref Range    Extra Tube Hold for Add Ons    COVID/Flu/RSV panel    Specimen: Nasal, Mid-turbinate; Swab   Result Value Ref Range    Rapid SARS-COV-2 by PCR Not Detected Not Detected / Detected / Presumptive Positive / Inhibitors present    Influenza A by PCR Not Detected Not Detected    Influenza B by PCR Not Detected Not Detected    RSV BY PCR Not Detected Not Detected    Isolation Guidelines      Procedural Comment     Comprehensive Metabolic Panel    Specimen: Blood, Venous   Result Value Ref Range    Fasting Status      Sodium 140 135 - 145 mmol/L    Potassium 3.3 (L) 3.4 - 5.1 mmol/L    Chloride 104 97 - 110 mmol/L    Carbon Dioxide 24 21 - 32 mmol/L    Anion Gap 15 7 - 19 mmol/L    Glucose 131 (H) 70 - 99 mg/dL    BUN 5 (L) 6 - 20 mg/dL    Creatinine 0.54 0.51 - 0.95 mg/dL    Glomerular Filtration Rate >90 >=60    BUN/Cr 9 7 - 25    Calcium 9.5 8.4 - 10.2 mg/dL    Bilirubin, Total 0.5 0.2 - 1.0 mg/dL     GOT/AST 16 <=37 Units/L    GPT/ALT 17 <64 Units/L    Alkaline Phosphatase 88 45 - 117 Units/L    Albumin 4.1 3.4 - 5.0 g/dL    Protein, Total 7.1 6.4 - 8.2 g/dL    Globulin 3.0 2.0 - 4.0 g/dL    A/G Ratio 1.4 1.0 - 2.4   CBC with Automated Differential (performable only)    Specimen: Blood, Venous   Result Value Ref Range    WBC 10.6 4.2 - 11.0 K/mcL    RBC 4.47 4.00 - 5.20 mil/mcL    HGB 14.0 12.0 - 15.5 g/dL    HCT 39.4 36.0 - 46.5 %    MCV 88.1 78.0 - 100.0 fl    MCH 31.3 26.0 - 34.0 pg    MCHC 35.5 32.0 - 36.5 g/dL    RDW-CV 12.2 11.0 - 15.0 %    RDW-SD 39.5 39.0 - 50.0 fL     140 - 450 K/mcL    NRBC 0 <=0 /100 WBC    Neutrophil, Percent 76 %    Lymphocytes, Percent 17 %    Mono, Percent 5 %    Eosinophils, Percent 0 %    Basophils, Percent 1 %    Immature Granulocytes 1 %    Absolute Neutrophils 8.1 (H) 1.8 - 7.7 K/mcL    Absolute Lymphocytes 1.8 1.0 - 4.8 K/mcL    Absolute Monocytes 0.6 0.3 - 0.9 K/mcL    Absolute Eosinophils  0.0 0.0 - 0.5 K/mcL    Absolute Basophils 0.1 0.0 - 0.3 K/mcL    Absolute Immature Granulocytes 0.1 0.0 - 0.2 K/mcL       EKG Results     EKG Interpretation  Rate: 75  Rhythm: normal sinus rhythm   Abnormality: no, qtc 474     EKG tracing interpreted by ED physician    Radiology Results     Imaging Results    None         ED Medication Orders (From admission, onward)      Ordered Start     Status Ordering Provider    02/05/25 0627 02/05/25 0630  ketorolac (TORADOL) injection 15 mg  ONCE         Last MAR action: Given INGRIS, MAX J    02/05/25 0627 02/05/25 0630  ondansetron (ZOFRAN) injection 4 mg  ONCE         Last MAR action: Given BRADRS, MAX J    02/05/25 0627 02/05/25 0630  sodium chloride (NORMAL SALINE) 0.9 % bolus 1,000 mL  ONCE         Last MAR action: GALO Benitez                 Medical Decision Making  Patient presents concern for nausea, vomiting, body aches.  Multiple diagnoses were considered including viral infection, acute intra-abdominal infection, small  bowel obstruction.  I reviewed the patient's CT images from yesterday, the images not been read by radiology at this point, does not appear to show small bowel obstruction.  Laboratory studies reviewed as noted above, reassuring normal white count, normal LFTs.  Viral swabs are negative.  Overall more suspicious for viral etiology because the patient's symptoms.  Discussed continued symptomatic treatment at home, worrisome signs and symptoms to prompt reevaluation, discharged in good condition.    Clinical Impression     ED Diagnosis   1. Nausea and vomiting, unspecified vomiting type            Disposition        Discharge 2/5/2025  7:24 AM  Ora Gomes discharge to home/self care.             Lucio Portillo MD  02/07/25 0220

## 2025-02-12 DIAGNOSIS — I10 ESSENTIAL HYPERTENSION: ICD-10-CM

## 2025-02-12 DIAGNOSIS — K21.9 CHRONIC GERD: ICD-10-CM

## 2025-02-12 DIAGNOSIS — K22.70 BARRETT'S ESOPHAGUS WITHOUT DYSPLASIA: ICD-10-CM

## 2025-02-12 RX ORDER — AMLODIPINE BESYLATE 5 MG/1
5 TABLET ORAL DAILY
Qty: 90 TABLET | Refills: 1 | Status: SHIPPED | OUTPATIENT
Start: 2025-02-12

## 2025-02-13 ENCOUNTER — IOP CHECK (OUTPATIENT)
Dept: URBAN - METROPOLITAN AREA CLINIC 6 | Facility: CLINIC | Age: 75
End: 2025-02-13

## 2025-02-13 DIAGNOSIS — H40.1132: ICD-10-CM

## 2025-02-13 PROCEDURE — 92083 EXTENDED VISUAL FIELD XM: CPT

## 2025-02-13 PROCEDURE — 92012 INTRM OPH EXAM EST PATIENT: CPT

## 2025-02-13 ASSESSMENT — VISUAL ACUITY
OS_SC: 20/20
OD_SC: 20/40

## 2025-02-13 ASSESSMENT — TONOMETRY
OD_IOP_MMHG: 20
OS_IOP_MMHG: 20

## 2025-02-20 ENCOUNTER — PROBLEM (OUTPATIENT)
Dept: URBAN - METROPOLITAN AREA CLINIC 6 | Facility: CLINIC | Age: 75
End: 2025-02-20

## 2025-02-20 DIAGNOSIS — H00.12: ICD-10-CM

## 2025-02-20 DIAGNOSIS — L03.213: ICD-10-CM

## 2025-02-20 PROCEDURE — 92012 INTRM OPH EXAM EST PATIENT: CPT

## 2025-02-20 ASSESSMENT — VISUAL ACUITY
OD_SC: 20/30+0
OS_SC: 20/20

## 2025-02-20 ASSESSMENT — TONOMETRY
OS_IOP_MMHG: 23
OD_IOP_MMHG: 23

## 2025-02-23 DIAGNOSIS — G44.001 INTRACTABLE CLUSTER HEADACHE SYNDROME, UNSPECIFIED CHRONICITY PATTERN: ICD-10-CM

## 2025-02-25 RX ORDER — RIZATRIPTAN BENZOATE 10 MG/1
10 TABLET ORAL AS NEEDED
Qty: 9 TABLET | Refills: 5 | Status: SHIPPED | OUTPATIENT
Start: 2025-02-25

## 2025-02-26 ENCOUNTER — OFFICE VISIT (OUTPATIENT)
Dept: FAMILY MEDICINE CLINIC | Facility: CLINIC | Age: 75
End: 2025-02-26
Payer: COMMERCIAL

## 2025-02-26 VITALS
OXYGEN SATURATION: 95 % | BODY MASS INDEX: 28.96 KG/M2 | WEIGHT: 153.4 LBS | SYSTOLIC BLOOD PRESSURE: 130 MMHG | RESPIRATION RATE: 18 BRPM | HEIGHT: 61 IN | HEART RATE: 76 BPM | TEMPERATURE: 100.1 F | DIASTOLIC BLOOD PRESSURE: 78 MMHG

## 2025-02-26 DIAGNOSIS — R50.9 FEVER, UNSPECIFIED FEVER CAUSE: Primary | ICD-10-CM

## 2025-02-26 LAB
SARS-COV-2 AG UPPER RESP QL IA: NEGATIVE
SL AMB POCT RAPID FLU A: NEGATIVE
SL AMB POCT RAPID FLU B: NEGATIVE
VALID CONTROL: NORMAL

## 2025-02-26 PROCEDURE — 99213 OFFICE O/P EST LOW 20 MIN: CPT | Performed by: FAMILY MEDICINE

## 2025-02-26 PROCEDURE — 87811 SARS-COV-2 COVID19 W/OPTIC: CPT | Performed by: FAMILY MEDICINE

## 2025-02-26 PROCEDURE — G2211 COMPLEX E/M VISIT ADD ON: HCPCS | Performed by: FAMILY MEDICINE

## 2025-02-26 PROCEDURE — 87804 INFLUENZA ASSAY W/OPTIC: CPT | Performed by: FAMILY MEDICINE

## 2025-02-26 RX ORDER — BIMATOPROST 0.1 MG/ML
1 SOLUTION/ DROPS OPHTHALMIC
COMMUNITY
Start: 2024-12-13

## 2025-02-26 NOTE — PROGRESS NOTES
"Name: Bola Sanders      : 1950      MRN: [id number]  Encounter Provider: Estefania Minor DO  Encounter Date: 2025   Encounter department: [facility]    Assessment & Plan  Fever, unspecified fever cause  On antibiotics  Will get cxr if no improvement by Friday  Encourage ambulation and supportive care  Orders:  •  POCT rapid flu A and B  •  POCT Rapid Covid Ag  •  XR chest pa and lateral; Future      Recent Results (from the past 24 hours)   POCT rapid flu A and B    Collection Time: 25 10:20 AM   Result Value Ref Range    RAPID FLU A Negative     RAPID FLU B Negative    POCT Rapid Covid Ag    Collection Time: 25 10:20 AM   Result Value Ref Range    POCT SARS-CoV-2 Ag Negative Negative    VALID CONTROL Valid       Assessment & Plan         History of Present Illness     History of Present Illness  5 days ago was in [facility], ordered food but lost appetite  Came home  Since then slept and has been sleeping 18 hours a day  Had cyst in eye- eye infection  On oral antibiotic and ointment- started monday  Temps running 100  No cough  No sore throat  No ear pain  Not exercising     Review of Systems   Constitutional:  Positive for fatigue and fever.   HENT: Negative.     Eyes: Negative.    Respiratory: Negative.     Cardiovascular: Negative.    Gastrointestinal: Negative.    Endocrine: Negative.    Genitourinary: Negative.    Musculoskeletal:  Positive for myalgias.   Allergic/Immunologic: Negative.    Neurological: Negative.    Hematological: Negative.    Psychiatric/Behavioral: Negative.       Objective   /78 (BP Location: Left arm, Patient Position: Sitting, Cuff Size: Large)   Pulse 76   Temp 100.1 °F (37.8 °C) (Tympanic)   Resp 18   Ht 5' 1\" (1.549 m)   Wt 69.6 kg (153 lb 6.4 oz)   SpO2 95%   BMI 28.98 kg/m²     Physical Exam    Physical Exam  Vitals and nursing note reviewed.   Constitutional:       Appearance: Normal appearance. He is well-developed.   HENT:     "  Head: Normocephalic and atraumatic.      Right Ear: External ear normal.      Left Ear: External ear normal.      Nose: Nose normal.   Eyes:      Conjunctiva/sclera: Conjunctivae normal.      Pupils: Pupils are equal, round, and reactive to light.   Cardiovascular:      Rate and Rhythm: Normal rate and regular rhythm.      Heart sounds: Normal heart sounds.   Pulmonary:      Effort: Pulmonary effort is normal.      Breath sounds: Normal breath sounds.   Abdominal:      General: Bowel sounds are normal.      Palpations: Abdomen is soft.   Musculoskeletal:         General: Normal range of motion.      Cervical back: Normal range of motion and neck supple.   Skin:     General: Skin is warm and dry.      Capillary Refill: Capillary refill takes less than 2 seconds.   Neurological:      Mental Status: He is alert and oriented to person, place, and time.   Psychiatric:         Behavior: Behavior normal.         Thought Content: Thought content normal.         Judgment: Judgment normal.

## 2025-02-26 NOTE — ASSESSMENT & PLAN NOTE
On antibiotics  Will get cxr if no improvement by Friday  Encourage ambulation and supportive care  Orders:  •  POCT rapid flu A and B  •  POCT Rapid Covid Ag  •  XR chest pa and lateral; Future

## 2025-02-28 ENCOUNTER — NURSE TRIAGE (OUTPATIENT)
Age: 75
End: 2025-02-28

## 2025-02-28 ENCOUNTER — APPOINTMENT (OUTPATIENT)
Dept: RADIOLOGY | Age: 75
End: 2025-02-28
Payer: COMMERCIAL

## 2025-02-28 ENCOUNTER — RESULTS FOLLOW-UP (OUTPATIENT)
Dept: FAMILY MEDICINE CLINIC | Facility: CLINIC | Age: 75
End: 2025-02-28

## 2025-02-28 DIAGNOSIS — R50.9 FEVER, UNSPECIFIED FEVER CAUSE: ICD-10-CM

## 2025-02-28 PROCEDURE — 71046 X-RAY EXAM CHEST 2 VIEWS: CPT

## 2025-02-28 NOTE — TELEPHONE ENCOUNTER
"Patient was seen on 2/26 for fevers.    He calls in today, stating that on 2/26, the fevers were gone.  On 2/27, he started with low grade temperatures again.  This morning he wakes up with a 101.2 temperature.  Only symptoms are muscle aches in the legs and a slight headache.  Will take tylenol for temp.      He is asking Dr. Minor's recommendation at this time.  He did not want an office visit.    Please advise and call patient back with recommendations.     Advised of ED protocol, should symptoms worsen.  Encouraged to call back with questions or concerns.     Reason for Disposition   Fever with no signs of serious infection or localizing symptoms    Answer Assessment - Initial Assessment Questions  1. TEMPERATURE: \"What is the most recent temperature?\"  \"How was it measured?\"       101.2, taken 5 minutes   2. ONSET: \"When did the fever start?\"       Fevers stopped Weds and then came back Thurs light,   3. CHILLS: \"Do you have chills?\" If yes: \"How bad are they?\"  (e.g., none, mild, moderate, severe)      Yes   4. OTHER SYMPTOMS: \"Do you have any other symptoms besides the fever?\"  (e.g., abdomen pain, cough, diarrhea, earache, headache, sore throat, urination pain)      Muscle pain in the legs, a little headache,   5. CAUSE: If there are no symptoms, ask: \"What do you think is causing the fever?\"       Unknown   6. CONTACTS: \"Does anyone else in the family have an infection?\"      Denies   7. TREATMENT: \"What have you done so far to treat this fever?\" (e.g., OTC fever medicines)   Tylenol, antibiotics    Protocols used: Fever-Adult-[place]    "

## 2025-03-06 ENCOUNTER — FOLLOW UP (OUTPATIENT)
Dept: URBAN - METROPOLITAN AREA CLINIC 6 | Facility: CLINIC | Age: 75
End: 2025-03-06

## 2025-03-06 DIAGNOSIS — H01.02B: ICD-10-CM

## 2025-03-06 DIAGNOSIS — H01.02A: ICD-10-CM

## 2025-03-06 PROCEDURE — 92012 INTRM OPH EXAM EST PATIENT: CPT

## 2025-03-06 ASSESSMENT — VISUAL ACUITY
OD_SC: 20/30+2
OS_SC: 20/20-1

## 2025-03-06 ASSESSMENT — TONOMETRY
OD_IOP_MMHG: 17
OS_IOP_MMHG: 18

## 2025-03-07 ENCOUNTER — OFFICE VISIT (OUTPATIENT)
Dept: FAMILY MEDICINE CLINIC | Facility: CLINIC | Age: 75
End: 2025-03-07
Payer: COMMERCIAL

## 2025-03-07 VITALS
TEMPERATURE: 97.9 F | SYSTOLIC BLOOD PRESSURE: 120 MMHG | HEART RATE: 74 BPM | OXYGEN SATURATION: 96 % | DIASTOLIC BLOOD PRESSURE: 72 MMHG | WEIGHT: 152.2 LBS | RESPIRATION RATE: 16 BRPM | HEIGHT: 61 IN | BODY MASS INDEX: 28.74 KG/M2

## 2025-03-07 DIAGNOSIS — R63.4 WEIGHT LOSS: ICD-10-CM

## 2025-03-07 DIAGNOSIS — J02.9 SORE THROAT: ICD-10-CM

## 2025-03-07 DIAGNOSIS — H69.93 ETD (EUSTACHIAN TUBE DYSFUNCTION), BILATERAL: Primary | ICD-10-CM

## 2025-03-07 LAB — S PYO AG THROAT QL: NEGATIVE

## 2025-03-07 PROCEDURE — 87880 STREP A ASSAY W/OPTIC: CPT | Performed by: FAMILY MEDICINE

## 2025-03-07 PROCEDURE — 87070 CULTURE OTHR SPECIMN AEROBIC: CPT | Performed by: FAMILY MEDICINE

## 2025-03-07 PROCEDURE — 99213 OFFICE O/P EST LOW 20 MIN: CPT | Performed by: FAMILY MEDICINE

## 2025-03-07 NOTE — ASSESSMENT & PLAN NOTE
Over the course of the illness has had about 7 pound weight loss.  Does admit to poor appetite and decreased oral intake.  -Will check blood work as ordered  -Continue advancing diet as able.  Continue adequate hydration as discussed.  -Follow-up with blood work results.  -Follow-up as needed.  Orders:    Comprehensive metabolic panel; Future    TSH, 3rd generation with Free T4 reflex; Future    CBC and differential; Future

## 2025-03-07 NOTE — PROGRESS NOTES
Name: Bola Sanders      : 1950      MRN: [id number]  Encounter Provider: Laureano Richards DO  Encounter Date: 3/7/2025   Encounter department: [facility]    Assessment & Plan  ETD (Eustachian tube dysfunction), bilateral  -Notes has been dealing with multiple sick symptoms over the past few weeks.  Started with eye infection which progressed to continued body aches, fevers, some congestion.  -He did complete antibiotics which were prescribed and he had period of time where he felt was improving then had increased ear pressure, pain and noting some balance issues mostly with positional changes.  -Denies room spinning.  Denies slurred speech, facial droop, changes in vision.  No weakness or numbness or tingling.  -On exam no evidence of ear infection however does have clear effusion.  -Continue symptomatic management to help sinus drainage.  -Continue adequate hydration  -Follow-up as needed.       Sore throat  Negative for strep.  Orders:    POCT rapid ANTIGEN strepA    Throat culture    Weight loss  Over the course of the illness has had about 7 pound weight loss.  Does admit to poor appetite and decreased oral intake.  -Will check blood work as ordered  -Continue advancing diet as able.  Continue adequate hydration as discussed.  -Follow-up with blood work results.  -Follow-up as needed.  Orders:    Comprehensive metabolic panel; Future    TSH, 3rd generation with Free T4 reflex; Future    CBC and differential; Future         History of Present Illness     Bola is a 74-year-old male presents today for ongoing issues with concern for body aches, congestion, ear pain.  He notes this has been going on for couple weeks.  He was seen previously and started on antibiotics.  He did complete antibiotics for which she does believe did provide some benefit and was feeling slightly improved however restarted with mild elevations in temperature and ear pain recently.  Also notes with that he  is having some balance issues feeling lightheaded when he gets up.  He does note he has had decreased oral intake over the past few weeks and has also lost about 7 pounds.  He denies any significant headaches, no visual changes or blurriness, no slurred speech or facial droop.  No weakness, no numbness or tingling.  He has been sleeping per his normal.  He continues to work however does feel more tired.  No abdominal pain no changes in stools.  No significant coughing, wheezing.  No chest pain or palpitations.      Review of Systems   Constitutional:  Negative for chills and fever.   HENT:  Positive for congestion and ear pain. Negative for sore throat.    Eyes:  Negative for pain and visual disturbance.   Respiratory:  Negative for cough and shortness of breath.    Cardiovascular:  Negative for chest pain and palpitations.   Gastrointestinal:  Negative for abdominal pain and vomiting.   Genitourinary:  Negative for dysuria and hematuria.   Musculoskeletal:  Negative for arthralgias and back pain.   Skin:  Negative for color change and rash.   Neurological:  Positive for dizziness. Negative for seizures and syncope.   Psychiatric/Behavioral:  Negative for confusion and sleep disturbance. The patient is not nervous/anxious.    All other systems reviewed and are negative.    Past Medical History:   Diagnosis Date    Acute non-recurrent maxillary sinusitis 04/15/2019    Allergic     Angina effort     Anxiety     Roberts esophagus 2000    Roberts's esophagus     Bleeding from left ear 04/26/2019    Chest pain 11/07/2019    Chest pain, unspecified     Colon polyp     Cough 02/13/2018    GERD (gastroesophageal reflux disease)     Glaucoma     Hernia 2014    HL (hearing loss) 10 years ago    Hyperlipidemia     Hypertension     Impacted cerumen of right ear 04/25/2019    Macular hole, right eye 01/28/2019    Last Assessment & Plan:  Impression:  1. Macular hole, right eye (dx 11/2018 ~295 microns, now 391 micron) 2. PP OD  (10/2018) 3. Cataract OS   Plan: - Second opinion. Lives in [place] with plan for surgery at [facility]. - Advocated getting the surgery as the best way to fix the MH - Follow up as needed    Migraine     Mild depression 2019    Otorrhea, left 2019    SBO (small bowel obstruction) (HCC) 2019     Past Surgical History:   Procedure Laterality Date    CATARACT EXTRACTION      COLONOSCOPY      EYE SURGERY      HERNIA REPAIR Right 2014    UPPER GASTROINTESTINAL ENDOSCOPY       Family History   Problem Relation Age of Onset    No Known Problems Mother     Heart attack Father         age 83    Heart disease Father     Hearing loss Father     Pancreatic cancer Brother     Stroke Paternal Grandmother     Breast cancer Paternal Aunt     Esophageal cancer Paternal Aunt     Heart attack Paternal Uncle      Social History     Tobacco Use    Smoking status: Former     Current packs/day: 0.00     Average packs/day: 0.3 packs/day for 10.0 years (2.5 ttl pk-yrs)     Types: Cigarettes     Start date: 1968     Quit date: 1978     Years since quittin.2     Passive exposure: Never    Smokeless tobacco: Never   Vaping Use    Vaping status: Never Used   Substance and Sexual Activity    Alcohol use: Yes     Alcohol/week: 4.0 standard drinks of alcohol     Types: 2 Glasses of wine, 2 Shots of liquor per week    Drug use: Never    Sexual activity: Yes     Partners: Female     Birth control/protection: None     Current Outpatient Medications on File Prior to Visit   Medication Sig    amLODIPine (NORVASC) 5 mg tablet TAKE 1 TABLET BY MOUTH EVERY DAY    aspirin 81 MG tablet Take by mouth daily    atorvastatin (LIPITOR) 20 mg tablet Take 1 tablet (20 mg total) by mouth daily    Cholecalciferol (D3-1000) 1,000 units tablet Take 1,000 Units by mouth daily    EQL Natural Zinc 50 MG TABS Take by mouth daily as needed    esomeprazole (NexIUM) 20 mg capsule TAKE 1 CAPSULE BY MOUTH EVERY DAY IN THE EARLY MORNING    latanoprost  "(XALATAN) 0.005 % ophthalmic solution Administer 1 drop to both eyes daily at bedtime    levothyroxine 50 mcg tablet TAKE 1 TABLET BY MOUTH EVERY DAY ON AN EMPTY STOMACH    Lumigan 0.01 % ophthalmic drops Administer 1 drop to both eyes daily at bedtime    methocarbamol (ROBAXIN) 500 mg tablet Take 1 tablet (500 mg total) by mouth 4 (four) times a day As needed    rizatriptan (MAXALT) 10 mg tablet Take 1 tablet (10 mg total) by mouth as needed for migraine Take at the onset of migraine; if symptoms continue or return, may take another dose at least 2 hours after first dose. Take no more than 2 doses in a day.    timolol (TIMOPTIC) 0.5 % ophthalmic solution Administer 2 drops to both eyes daily    valsartan (DIOVAN) 160 mg tablet Take 1 tablet (160 mg total) by mouth daily    ERYTHROMYCIN OP Apply to eye (Patient not taking: Reported on 3/7/2025)     Allergies   Allergen Reactions    Clindamycin GI Intolerance     Per Patient    Doxycycline Other (See Comments)     unknown    Influenza Vaccines Other (See Comments)     Flu like symptoms     Nitrous Oxide      Immunization History   Administered Date(s) Administered    COVID-19 PFIZER VACCINE 0.3 ML IM 02/04/2021, 02/24/2021, 10/09/2021    COVID-19 Pfizer mRNA vacc PF vince-sucrose 12 yr and older (Comirnaty) 10/10/2024    COVID-19 Pfizer vac (Vince-sucrose, gray cap) 12 yr+ IM 04/23/2022    Hep A, adult 06/30/2017    Influenza Split High Dose Preservative Free IM 09/30/2014, 11/02/2015, 12/03/2017    Pneumococcal Conjugate 13-Valent 08/28/2017    Pneumococcal Polysaccharide PPV23 05/30/2014    Tdap 02/15/2018    Zoster 06/24/2014    Zoster Vaccine Recombinant 02/27/2023     Objective   /72 (BP Location: Left arm, Patient Position: Sitting, Cuff Size: Standard)   Pulse 74   Temp 97.9 °F (36.6 °C) (Tympanic)   Resp 16   Ht 5' 1\" (1.549 m)   Wt 69 kg (152 lb 3.2 oz)   SpO2 96%   BMI 28.76 kg/m²     Physical Exam  Vitals and nursing note reviewed. "   Constitutional:       General: He is not in acute distress.     Appearance: Normal appearance.   HENT:      Head: Normocephalic and atraumatic.      Right Ear: Tympanic membrane and external ear normal.      Left Ear: A middle ear effusion is present.      Nose: Nose normal.      Mouth/Throat:      Mouth: Mucous membranes are moist.   Eyes:      Extraocular Movements: Extraocular movements intact.      Conjunctiva/sclera: Conjunctivae normal.      Pupils: Pupils are equal, round, and reactive to light.   Cardiovascular:      Rate and Rhythm: Normal rate and regular rhythm.      Pulses: Normal pulses.      Heart sounds: No murmur heard.  Pulmonary:      Effort: Pulmonary effort is normal.      Breath sounds: Normal breath sounds. No wheezing, rhonchi or rales.   Abdominal:      General: Bowel sounds are normal.      Palpations: Abdomen is soft.      Tenderness: There is no abdominal tenderness. There is no guarding.   Musculoskeletal:         General: Normal range of motion.      Cervical back: Normal range of motion.      Right lower leg: No edema.      Left lower leg: No edema.   Lymphadenopathy:      Cervical: No cervical adenopathy.   Skin:     General: Skin is warm.      Capillary Refill: Capillary refill takes less than 2 seconds.   Neurological:      General: No focal deficit present.      Mental Status: He is alert and oriented to person, place, and time.   Psychiatric:         Mood and Affect: Mood normal.         Behavior: Behavior normal.

## 2025-03-08 ENCOUNTER — APPOINTMENT (OUTPATIENT)
Dept: LAB | Age: 75
End: 2025-03-08
Payer: COMMERCIAL

## 2025-03-08 DIAGNOSIS — R63.4 WEIGHT LOSS: ICD-10-CM

## 2025-03-08 LAB
ALBUMIN SERPL BCG-MCNC: 4.1 G/DL (ref 3.5–5)
ALP SERPL-CCNC: 52 U/L (ref 34–104)
ALT SERPL W P-5'-P-CCNC: 8 U/L (ref 7–52)
ANION GAP SERPL CALCULATED.3IONS-SCNC: 8 MMOL/L (ref 4–13)
AST SERPL W P-5'-P-CCNC: 18 U/L (ref 13–39)
BASOPHILS # BLD AUTO: 0.03 THOUSANDS/ÂΜL (ref 0–0.1)
BASOPHILS NFR BLD AUTO: 1 % (ref 0–1)
BILIRUB SERPL-MCNC: 1.01 MG/DL (ref 0.2–1)
BUN SERPL-MCNC: 13 MG/DL (ref 5–25)
CALCIUM SERPL-MCNC: 9.4 MG/DL (ref 8.4–10.2)
CHLORIDE SERPL-SCNC: 101 MMOL/L (ref 96–108)
CO2 SERPL-SCNC: 29 MMOL/L (ref 21–32)
CREAT SERPL-MCNC: 1 MG/DL (ref 0.6–1.3)
EOSINOPHIL # BLD AUTO: 0.21 THOUSAND/ÂΜL (ref 0–0.61)
EOSINOPHIL NFR BLD AUTO: 4 % (ref 0–6)
ERYTHROCYTE [DISTWIDTH] IN BLOOD BY AUTOMATED COUNT: 12.4 % (ref 11.6–15.1)
GFR SERPL CREATININE-BSD FRML MDRD: 72 ML/MIN/1.73SQ M
GLUCOSE P FAST SERPL-MCNC: 101 MG/DL (ref 65–99)
HCT VFR BLD AUTO: 45.9 % (ref 36.5–49.3)
HGB BLD-MCNC: 15.5 G/DL (ref 12–17)
IMM GRANULOCYTES # BLD AUTO: 0.01 THOUSAND/UL (ref 0–0.2)
IMM GRANULOCYTES NFR BLD AUTO: 0 % (ref 0–2)
LYMPHOCYTES # BLD AUTO: 2.19 THOUSANDS/ÂΜL (ref 0.6–4.47)
LYMPHOCYTES NFR BLD AUTO: 36 % (ref 14–44)
MCH RBC QN AUTO: 30.1 PG (ref 26.8–34.3)
MCHC RBC AUTO-ENTMCNC: 33.8 G/DL (ref 31.4–37.4)
MCV RBC AUTO: 89 FL (ref 82–98)
MONOCYTES # BLD AUTO: 0.55 THOUSAND/ÂΜL (ref 0.17–1.22)
MONOCYTES NFR BLD AUTO: 9 % (ref 4–12)
NEUTROPHILS # BLD AUTO: 3.03 THOUSANDS/ÂΜL (ref 1.85–7.62)
NEUTS SEG NFR BLD AUTO: 50 % (ref 43–75)
NRBC BLD AUTO-RTO: 0 /100 WBCS
PLATELET # BLD AUTO: 271 THOUSANDS/UL (ref 149–390)
PMV BLD AUTO: 10.5 FL (ref 8.9–12.7)
POTASSIUM SERPL-SCNC: 3.8 MMOL/L (ref 3.5–5.3)
PROT SERPL-MCNC: 6.1 G/DL (ref 6.4–8.4)
RBC # BLD AUTO: 5.15 MILLION/UL (ref 3.88–5.62)
SODIUM SERPL-SCNC: 138 MMOL/L (ref 135–147)
TSH SERPL DL<=0.05 MIU/L-ACNC: 3.01 UIU/ML (ref 0.45–4.5)
WBC # BLD AUTO: 6.02 THOUSAND/UL (ref 4.31–10.16)

## 2025-03-08 PROCEDURE — 85025 COMPLETE CBC W/AUTO DIFF WBC: CPT

## 2025-03-08 PROCEDURE — 36415 COLL VENOUS BLD VENIPUNCTURE: CPT

## 2025-03-08 PROCEDURE — 80053 COMPREHEN METABOLIC PANEL: CPT

## 2025-03-08 PROCEDURE — 84443 ASSAY THYROID STIM HORMONE: CPT

## 2025-03-09 LAB — BACTERIA THROAT CULT: NORMAL

## 2025-03-13 ENCOUNTER — RESULTS FOLLOW-UP (OUTPATIENT)
Dept: FAMILY MEDICINE CLINIC | Facility: CLINIC | Age: 75
End: 2025-03-13

## 2025-03-13 NOTE — ASSESSMENT & PLAN NOTE
-Notes has been dealing with multiple sick symptoms over the past few weeks.  Started with eye infection which progressed to continued body aches, fevers, some congestion.  -He did complete antibiotics which were prescribed and he had period of time where he felt was improving then had increased ear pressure, pain and noting some balance issues mostly with positional changes.  -Denies room spinning.  Denies slurred speech, facial droop, changes in vision.  No weakness or numbness or tingling.  -On exam no evidence of ear infection however does have clear effusion.  -Continue symptomatic management to help sinus drainage.  -Continue adequate hydration  -Follow-up as needed.

## 2025-03-13 NOTE — RESULT ENCOUNTER NOTE
[place],    Blood work is all come back.  Does show mild decrease in your protein, likely from decreased oral intake.  Your blood counts and thyroid are all within normal limits.  All of your other electrolytes look great.  No other concerning results.  Would recommend continuing with decongestant, nasal rinsing for congestion buildup.  Follow-up with us if no improvement.    Thank you  -Dr. MENDOZA

## 2025-03-24 DIAGNOSIS — E03.9 ACQUIRED HYPOTHYROIDISM: ICD-10-CM

## 2025-03-24 RX ORDER — LEVOTHYROXINE SODIUM 50 UG/1
50 TABLET ORAL DAILY
Qty: 90 TABLET | Refills: 1 | Status: SHIPPED | OUTPATIENT
Start: 2025-03-24

## 2025-03-26 NOTE — PROGRESS NOTES
"Cardiology Clinic Note    Bola Sanders 74 y.o. male   MRN: [id number]  Encounter: [id number]        Assessment / Plan:    # chest pain  Per prior cardiologist note:  \"Atypical chest pain most probably musculoskeletal aggravated with an element of anxiety.  No ischemic component was identified on prior evaluations\"  Nuclear stress in 2021 - normal perfusion  CT coronary angiogram 3/2024 -   No stenosis.  Superficial myocardial bridging of a 2 cm segment of the mid LAD.  Small hiatal hernia.   Coronary vasospasm would be on the differential.  Sx's improved with norvasc.  Sx's may be from hiatal hernia, also a consideration.    # coronary calcification  Mild -  small amount of calcified plaque in left main.   Calcium score 2.   ASA, statin    # HTN        (Had mild hyponatremia with HCTZ)     Valsartan 160 mg daily (no benefit at higher dose)     Norvasc 5 mg daily (may get worse edema at higher dose)  For now we will continue to monitor.  Thinking about future options (if needed) -- carvedilol would not be ideal because of his baseline lower heart rate.  Considerations for next step would include eplerenone or hydralazine.     # HLD  Lipitor 20 mg daily  Last .   See what next LDL is, consider ultimately increasing lipitor or adding zetia    # Bradycardia  Reports HR as low as 47 bpm at times per apple watch (when resting)  He has a narrow QRS on EKG.  Can get HR to 160's with exercise.  No symptoms associated with low HR.  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     # migraines  Very rare  On triptan  (rare use)      Today's Plan Summary:  See above assessment/plan for full details of today's plan.  Briefly,     No medication changes.               Reason For Visit / Chief Complaint:  F/u -  hypertension, hyperlipidemia, chest pain    HPI:   Bola Sanders is a 74 y.o.  male with history as noted in the problem list and further detailed in the above assessment and " plan.    Initial:   Nov 2023  Here to establish care with a new cardiologist  The patient has a history of HTN, HLD, and atypical chest pain with prior negative nuclear stress testing.  Last saw Dr Recio in April 2023 -  no med changes at that time.  Today -  reports the chest pain is sharp.   Absolutely random in terms of when it occurs.   Last event was when walking in from garage.   Sat down and it went away.  Typically lasts about 15 minutes (only severe for a few seconds).    Occurs about 1 episode per month on average.   It is not exertional.  He is a serious road cyclist.  Never gets the chest pain with biking.  Can get HR up to 160 with exercise - no sx's with this.    Mild SOB since had covid - feels like lung capacity is a bit lower than previously.  Retired .  1calendar.   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  Serious road cyclist.  Also likes mountain biking.   .  2 children.  Former smoker (quit in 1970s).    Interval:  Last visit -->   still biking.   Gets a little chest pain at the very beginning of the ride but then keeps going and does long rides without any chest pain.    Plan last visit -->    BP at goal today here.  But some high readings at home.  He will buy a new cuff and monitor.     Oct - patient called and his blood pressure still above goal. Will start hydrochlorothiazide 25 mg daily and check BMP in 2 weeks.     Labs - mild low Na on HCTZ.    Stopped HCTZ.    Today  -  feeling pretty well lately.  No recent chest pain.  Doing biking.   No CP with this. No SOB or HARRIS.            Cardiac Imaging personally reviewed:  EKG 11-2-23  NSR at 60 bpm.    3-27-25  Sinus bradycardia at 53 bpm  Poor R wave progression       Holter or event monitor    Echo 2021  EF 60%    Echo - 07/24/24   EF 60%  IVC normal in size    .    JESSICA    Cardiac MRI    Stress testing Nuclear 2021 -  normal perfusion   Coronary CTA or [facility] CT coronary  angiogram - 03/15/24   Small amount of calcified plaque in left main.    No significant coronary stenosis.  Coronary calcium score - 2  Superficial myocardial bridging of a 2 cm segment of the mid LAD  Small hiatal hernia.       RHC    CPET            Patient Active Problem List    Diagnosis Date Noted   • Weight loss 03/07/2025   • Fever 02/26/2025   • Coronary artery calcification 04/18/2024   • Family history of pancreatic cancer 05/25/2023   • History of COVID-19 01/23/2023   • Chronic pain of both knees 02/16/2021   • Bilateral primary osteoarthritis of knee 08/13/2020   • ETD (Eustachian tube dysfunction), bilateral 08/21/2019   • Sensorineural hearing loss (SNHL), bilateral 08/21/2019   • Roberts's esophagus 02/13/2018   • Chronic GERD 08/28/2017   • Acquired hypothyroidism 11/17/2016   • Osteoarthrosis, localized, primary, knee, right 10/13/2015   • Essential hypertension 10/02/2015   • Mixed hyperlipidemia 10/02/2015   • Primary localized osteoarthritis of left knee 08/27/2015       Past Medical History:   Diagnosis Date   • Acute non-recurrent maxillary sinusitis 04/15/2019   • Allergic    • Angina effort    • Anxiety    • Roberts esophagus 2000   • Roberts's esophagus    • Bleeding from left ear 04/26/2019   • Chest pain 11/07/2019   • Chest pain, unspecified    • Colon polyp    • Cough 02/13/2018   • GERD (gastroesophageal reflux disease)    • Glaucoma    • Hernia 2014   • HL (hearing loss) 10 years ago   • Hyperlipidemia    • Hypertension    • Impacted cerumen of right ear 04/25/2019   • Macular hole, right eye 01/28/2019    Last Assessment & Plan:  Impression:  1. Macular hole, right eye (dx 11/2018 ~295 microns, now 391 micron) 2. PP OD (10/2018) 3. Cataract OS   Plan: - Second opinion. Lives in [place] with plan for surgery at [facility]. - Advocated getting the surgery as the best way to fix the MH - Follow up as needed   • Migraine    • Mild depression 11/26/2019   • Otorrhea, left 04/25/2019   • SBO (small  bowel obstruction) ([facility]) 2019         Allergies   Allergen Reactions   • Clindamycin GI Intolerance     Per Patient   • Doxycycline Other (See Comments)     unknown   • Influenza Vaccines Other (See Comments)     Flu like symptoms    • Nitrous Oxide        Current Outpatient Medications   Medication Instructions   • amLODIPine (NORVASC) 5 mg, Oral, Daily   • aspirin 81 MG tablet Daily   • atorvastatin (LIPITOR) 20 mg, Oral, Daily   • Cholecalciferol (D3-1000) 1,000 Units, Daily   • EQL Natural Zinc 50 MG TABS Daily PRN   • ERYTHROMYCIN OP Apply to eye   • esomeprazole (NexIUM) 20 mg capsule TAKE 1 CAPSULE BY MOUTH EVERY DAY IN THE EARLY MORNING   • latanoprost (XALATAN) 0.005 % ophthalmic solution 1 drop, Daily at bedtime   • levothyroxine 50 mcg, Oral, Daily, Take on an empty stomach   • Lumigan 0.01 % ophthalmic drops 1 drop, Daily at bedtime   • methocarbamol (ROBAXIN) 500 mg, Oral, 4 times daily, As needed   • rizatriptan (MAXALT) 10 mg, Oral, As needed, Take at the onset of migraine; if symptoms continue or return, may take another dose at least 2 hours after first dose. Take no more than 2 doses in a day.   • timolol (TIMOPTIC) 0.5 % ophthalmic solution 2 drops, Daily   • valsartan (DIOVAN) 160 mg, Oral, Daily       Social History     Socioeconomic History   • Marital status: /Civil Union     Spouse name: Not on file   • Number of children: Not on file   • Years of education: Not on file   • Highest education level: Not on file   Occupational History   • Not on file   Tobacco Use   • Smoking status: Former     Current packs/day: 0.00     Average packs/day: 0.3 packs/day for 10.0 years (2.5 ttl pk-yrs)     Types: Cigarettes     Start date: 1968     Quit date: 1978     Years since quittin.2     Passive exposure: Never   • Smokeless tobacco: Never   Vaping Use   • Vaping status: Never Used   Substance and Sexual Activity   • Alcohol use: Yes     Alcohol/week: 4.0 standard drinks of  "alcohol     Types: 2 Glasses of wine, 2 Shots of liquor per week   • Drug use: Never   • Sexual activity: Yes     Partners: Female     Birth control/protection: None   Other Topics Concern   • Not on file   Social History Narrative   • Not on file     Social Drivers of Health     Financial Resource Strain: Low Risk  (12/13/2022)    Overall Financial Resource Strain (CARDIA)    • Difficulty of Paying Living Expenses: Not hard at all   Food Insecurity: No Food Insecurity (4/16/2024)    Nursing - Inadequate Food Risk Classification    • Worried About Running Out of Food in the Last Year: Never true    • Ran Out of Food in the Last Year: Never true    • Ran Out of Food in the Last Year: Not on file   Transportation Needs: No Transportation Needs (4/16/2024)    PRAPARE - Transportation    • Lack of Transportation (Medical): No    • Lack of Transportation (Non-Medical): No   Physical Activity: Not on file   Stress: Not on file   Social Connections: Not on file   Intimate Partner Violence: Not on file   Housing Stability: Low Risk  (4/16/2024)    Housing Stability Vital Sign    • Unable to Pay for Housing in the Last Year: No    • Number of Times Moved in the Last Year: 1    • Homeless in the Last Year: No       Family History   Problem Relation Age of Onset   • No Known Problems Mother    • Heart attack Father         age 83   • Heart disease Father    • Hearing loss Father    • Pancreatic cancer Brother    • Stroke Paternal Grandmother    • Breast cancer Paternal Aunt    • Esophageal cancer Paternal Aunt    • Heart attack Paternal Uncle        Physical Exam:  Blood pressure 142/78, pulse (!) 53, height 5' 1\" (1.549 m), weight 68.3 kg (150 lb 8 oz), SpO2 98%.  Body mass index is 28.44 kg/m².  Wt Readings from Last 3 Encounters:   03/27/25 68.3 kg (150 lb 8 oz)   03/07/25 69 kg (152 lb 3.2 oz)   02/26/25 69.6 kg (153 lb 6.4 oz)     Physical Exam  Vitals reviewed.   Constitutional:       General: He is not in acute " distress.     Appearance: He is not toxic-appearing.   Neck:      Comments: No JVD  Cardiovascular:      Rate and Rhythm: Normal rate and regular rhythm.      Heart sounds: No murmur heard.     No friction rub. No gallop.   Pulmonary:      Breath sounds: Normal breath sounds. No wheezing, rhonchi or rales.   Musculoskeletal:      Comments: trace LE edema   Neurological:      Mental Status: He is alert.         Labs & Results:  Lab Results   Component Value Date    SODIUM 138 03/08/2025    K 3.8 03/08/2025     03/08/2025    CO2 29 03/08/2025    BUN 13 03/08/2025    CREATININE 1.00 03/08/2025    GLUC 111 03/24/2019    CALCIUM 9.4 03/08/2025         Thank you for the opportunity to participate in the care of this patient.    Jesus Stephens MD, [facility]  Advanced Heart Failure and Transplant Cardiologist  Director of Cardio-Oncology  [facility]

## 2025-03-27 ENCOUNTER — OFFICE VISIT (OUTPATIENT)
Dept: CARDIOLOGY CLINIC | Facility: CLINIC | Age: 75
End: 2025-03-27
Payer: COMMERCIAL

## 2025-03-27 VITALS
OXYGEN SATURATION: 98 % | DIASTOLIC BLOOD PRESSURE: 78 MMHG | WEIGHT: 150.5 LBS | BODY MASS INDEX: 28.42 KG/M2 | HEART RATE: 53 BPM | SYSTOLIC BLOOD PRESSURE: 142 MMHG | HEIGHT: 61 IN

## 2025-03-27 DIAGNOSIS — R00.1 BRADYCARDIA: ICD-10-CM

## 2025-03-27 DIAGNOSIS — R07.9 CHEST PAIN, UNSPECIFIED TYPE: ICD-10-CM

## 2025-03-27 DIAGNOSIS — I10 ESSENTIAL HYPERTENSION: Primary | ICD-10-CM

## 2025-03-27 DIAGNOSIS — I10 PRIMARY HYPERTENSION: ICD-10-CM

## 2025-03-27 DIAGNOSIS — E78.2 MIXED HYPERLIPIDEMIA: ICD-10-CM

## 2025-03-27 DIAGNOSIS — I25.10 CORONARY ARTERY CALCIFICATION: ICD-10-CM

## 2025-03-27 PROCEDURE — 99214 OFFICE O/P EST MOD 30 MIN: CPT | Performed by: INTERNAL MEDICINE

## 2025-03-27 PROCEDURE — 93000 ELECTROCARDIOGRAM COMPLETE: CPT | Performed by: INTERNAL MEDICINE

## 2025-03-28 PROBLEM — R50.9 FEVER: Status: RESOLVED | Noted: 2025-02-26 | Resolved: 2025-03-28

## 2025-04-22 ENCOUNTER — APPOINTMENT (OUTPATIENT)
Dept: LAB | Age: 75
End: 2025-04-22
Attending: FAMILY MEDICINE
Payer: COMMERCIAL

## 2025-04-22 DIAGNOSIS — I10 ESSENTIAL HYPERTENSION: ICD-10-CM

## 2025-04-22 DIAGNOSIS — E78.2 MIXED HYPERLIPIDEMIA: ICD-10-CM

## 2025-04-22 DIAGNOSIS — E03.9 ACQUIRED HYPOTHYROIDISM: ICD-10-CM

## 2025-04-22 DIAGNOSIS — Z12.5 SCREENING FOR PROSTATE CANCER: ICD-10-CM

## 2025-04-22 LAB
ALBUMIN SERPL BCG-MCNC: 4 G/DL (ref 3.5–5)
ALP SERPL-CCNC: 56 U/L (ref 34–104)
ALT SERPL W P-5'-P-CCNC: 11 U/L (ref 7–52)
ANION GAP SERPL CALCULATED.3IONS-SCNC: 5 MMOL/L (ref 4–13)
AST SERPL W P-5'-P-CCNC: 20 U/L (ref 13–39)
BILIRUB SERPL-MCNC: 0.8 MG/DL (ref 0.2–1)
BUN SERPL-MCNC: 13 MG/DL (ref 5–25)
CALCIUM SERPL-MCNC: 9.4 MG/DL (ref 8.4–10.2)
CHLORIDE SERPL-SCNC: 105 MMOL/L (ref 96–108)
CHOLEST SERPL-MCNC: 191 MG/DL (ref ?–200)
CO2 SERPL-SCNC: 31 MMOL/L (ref 21–32)
CREAT SERPL-MCNC: 0.99 MG/DL (ref 0.6–1.3)
ERYTHROCYTE [DISTWIDTH] IN BLOOD BY AUTOMATED COUNT: 12.6 % (ref 11.6–15.1)
GFR SERPL CREATININE-BSD FRML MDRD: 73 ML/MIN/1.73SQ M
GLUCOSE P FAST SERPL-MCNC: 92 MG/DL (ref 65–99)
HCT VFR BLD AUTO: 46.3 % (ref 36.5–49.3)
HDLC SERPL-MCNC: 71 MG/DL
HGB BLD-MCNC: 15.6 G/DL (ref 12–17)
LDLC SERPL CALC-MCNC: 92 MG/DL (ref 0–100)
MCH RBC QN AUTO: 29.7 PG (ref 26.8–34.3)
MCHC RBC AUTO-ENTMCNC: 33.7 G/DL (ref 31.4–37.4)
MCV RBC AUTO: 88 FL (ref 82–98)
PLATELET # BLD AUTO: 216 THOUSANDS/UL (ref 149–390)
PMV BLD AUTO: 10.7 FL (ref 8.9–12.7)
POTASSIUM SERPL-SCNC: 3.8 MMOL/L (ref 3.5–5.3)
PROT SERPL-MCNC: 6.3 G/DL (ref 6.4–8.4)
PSA SERPL-MCNC: 4.05 NG/ML (ref 0–4)
RBC # BLD AUTO: 5.25 MILLION/UL (ref 3.88–5.62)
SODIUM SERPL-SCNC: 141 MMOL/L (ref 135–147)
TRIGL SERPL-MCNC: 138 MG/DL (ref ?–150)
TSH SERPL DL<=0.05 MIU/L-ACNC: 2.85 UIU/ML (ref 0.45–4.5)
WBC # BLD AUTO: 5.64 THOUSAND/UL (ref 4.31–10.16)

## 2025-04-22 PROCEDURE — 84443 ASSAY THYROID STIM HORMONE: CPT

## 2025-04-22 PROCEDURE — 85027 COMPLETE CBC AUTOMATED: CPT

## 2025-04-22 PROCEDURE — 80061 LIPID PANEL: CPT

## 2025-04-22 PROCEDURE — G0103 PSA SCREENING: HCPCS

## 2025-04-22 PROCEDURE — 80053 COMPREHEN METABOLIC PANEL: CPT

## 2025-04-22 PROCEDURE — 36415 COLL VENOUS BLD VENIPUNCTURE: CPT

## 2025-04-23 ENCOUNTER — RESULTS FOLLOW-UP (OUTPATIENT)
Dept: FAMILY MEDICINE CLINIC | Facility: CLINIC | Age: 75
End: 2025-04-23

## 2025-04-29 ENCOUNTER — OFFICE VISIT (OUTPATIENT)
Dept: FAMILY MEDICINE CLINIC | Facility: CLINIC | Age: 75
End: 2025-04-29
Payer: COMMERCIAL

## 2025-04-29 VITALS
BODY MASS INDEX: 28.58 KG/M2 | OXYGEN SATURATION: 97 % | SYSTOLIC BLOOD PRESSURE: 138 MMHG | RESPIRATION RATE: 16 BRPM | HEART RATE: 67 BPM | TEMPERATURE: 98.4 F | HEIGHT: 61 IN | DIASTOLIC BLOOD PRESSURE: 84 MMHG | WEIGHT: 151.4 LBS

## 2025-04-29 DIAGNOSIS — R97.20 ELEVATED PSA: ICD-10-CM

## 2025-04-29 DIAGNOSIS — E03.9 ACQUIRED HYPOTHYROIDISM: ICD-10-CM

## 2025-04-29 DIAGNOSIS — Z00.00 MEDICARE ANNUAL WELLNESS VISIT, SUBSEQUENT: Primary | ICD-10-CM

## 2025-04-29 DIAGNOSIS — E78.2 MIXED HYPERLIPIDEMIA: ICD-10-CM

## 2025-04-29 DIAGNOSIS — L98.9 FACIAL LESION: ICD-10-CM

## 2025-04-29 PROCEDURE — G0439 PPPS, SUBSEQ VISIT: HCPCS | Performed by: FAMILY MEDICINE

## 2025-04-29 NOTE — PATIENT INSTRUCTIONS
Medicare Preventive Visit Patient Instructions  Thank you for completing your Welcome to Medicare Visit or Medicare Annual Wellness Visit today. Your next wellness visit will be due in one year (4/30/2026).  The screening/preventive services that you may require over the next 5-10 years are detailed below. Some tests may not apply to you based off risk factors and/or age. Screening tests ordered at today's visit but not completed yet may show as past due. Also, please note that scanned in results may not display below.  Preventive Screenings:  Service Recommendations Previous Testing/Comments   Colorectal Cancer Screening  Colonoscopy    Fecal Occult Blood Test (FOBT)/Fecal Immunochemical Test (FIT)  Fecal DNA/Cologuard Test  Flexible Sigmoidoscopy Age: 45-75 years old   Colonoscopy: every 10 years (May be performed more frequently if at higher risk)  OR  FOBT/FIT: every 1 year  OR  Cologuard: every 3 years  OR  Sigmoidoscopy: every 5 years  Screening may be recommended earlier than age 45 if at higher risk for colorectal cancer. Also, an individualized decision between you and your healthcare provider will decide whether screening between the ages of 76-85 would be appropriate. Colonoscopy: 01/16/2023  FOBT/FIT: Not on file  Cologuard: Not on file  Sigmoidoscopy: Not on file    Screening Current     Prostate Cancer Screening Individualized decision between patient and health care provider in men between ages of 55-69   Medicare will cover every 12 months beginning on the day after your 50th birthday PSA: 4.052 ng/mL     Screening Not Indicated     Hepatitis C Screening Once for adults born between 1945 and 1965  More frequently in patients at high risk for Hepatitis C Hep C Antibody: 09/13/2017    Screening Current   Diabetes Screening 1-2 times per year if you're at risk for diabetes or have pre-diabetes Fasting glucose: 92 mg/dL (4/22/2025)  A1C: No results in last 5 years (No results in last 5 years)  Screening  Current   Cholesterol Screening Once every 5 years if you don't have a lipid disorder. May order more often based on risk factors. Lipid panel: 04/22/2025  Screening Not Indicated  History Lipid Disorder      Other Preventive Screenings Covered by Medicare:  Abdominal Aortic Aneurysm (AAA) Screening: covered once if your at risk. You're considered to be at risk if you have a family history of AAA or a male between the age of 65-75 who smoking at least 100 cigarettes in your lifetime.  Lung Cancer Screening: covers low dose CT scan once per year if you meet all of the following conditions: (1) Age 55-77; (2) No signs or symptoms of lung cancer; (3) Current smoker or have quit smoking within the last 15 years; (4) You have a tobacco smoking history of at least 20 pack years (packs per day x number of years you smoked); (5) You get a written order from a healthcare provider.  Glaucoma Screening: covered annually if you're considered high risk: (1) You have diabetes OR (2) Family history of glaucoma OR (3)  aged 50 and older OR (4)  American aged 65 and older  Osteoporosis Screening: covered every 2 years if you meet one of the following conditions: (1) Have a vertebral abnormality; (2) On glucocorticoid therapy for more than 3 months; (3) Have primary hyperparathyroidism; (4) On osteoporosis medications and need to assess response to drug therapy.  HIV Screening: covered annually if you're between the age of 15-65. Also covered annually if you are younger than 15 and older than 65 with risk factors for HIV infection. For pregnant patients, it is covered up to 3 times per pregnancy.    Immunizations:  Immunization Recommendations   Influenza Vaccine Annual influenza vaccination during flu season is recommended for all persons aged >= 6 months who do not have contraindications   Pneumococcal Vaccine   * Pneumococcal conjugate vaccine = PCV13 (Prevnar 13), PCV15 (Vaxneuvance), PCV20 (Prevnar 20)  *  Pneumococcal polysaccharide vaccine = PPSV23 (Pneumovax) Adults 19-65 yo with certain risk factors or if 65+ yo  If never received any pneumonia vaccine: recommend Prevnar 20 (PCV20)  Give PCV20 if previously received 1 dose of PCV13 or PPSV23   Hepatitis B Vaccine 3 dose series if at intermediate or high risk (ex: diabetes, end stage renal disease, liver disease)   Respiratory syncytial virus (RSV) Vaccine - COVERED BY MEDICARE PART D  * RSVPreF3 (Arexvy) CDC recommends that adults 60 years of age and older may receive a single dose of RSV vaccine using shared clinical decision-making (SCDM)   Tetanus (Td) Vaccine - COST NOT COVERED BY MEDICARE PART B Following completion of primary series, a booster dose should be given every 10 years to maintain immunity against tetanus. Td may also be given as tetanus wound prophylaxis.   Tdap Vaccine - COST NOT COVERED BY MEDICARE PART B Recommended at least once for all adults. For pregnant patients, recommended with each pregnancy.   Shingles Vaccine (Shingrix) - COST NOT COVERED BY MEDICARE PART B  2 shot series recommended in those 19 years and older who have or will have weakened immune systems or those 50 years and older     Health Maintenance Due:      Topic Date Due   • Colorectal Cancer Screening  01/16/2028   • Hepatitis C Screening  Completed     Immunizations Due:      Topic Date Due   • COVID-19 Vaccine (6 - 2024-25 season) 04/10/2025     Advance Directives   What are advance directives?  Advance directives are legal documents that state your wishes and plans for medical care. These plans are made ahead of time in case you lose your ability to make decisions for yourself. Advance directives can apply to any medical decision, such as the treatments you want, and if you want to donate organs.   What are the types of advance directives?  There are many types of advance directives, and each state has rules about how to use them. You may choose a combination of any of  the following:  Living will:  This is a written record of the treatment you want. You can also choose which treatments you do not want, which to limit, and which to stop at a certain time. This includes surgery, medicine, IV fluid, and tube feedings.   Durable power of  for healthcare (DPAHC):  This is a written record that states who you want to make healthcare choices for you when you are unable to make them for yourself. This person, called a proxy, is usually a family member or a friend. You may choose more than 1 proxy.  Do not resuscitate (DNR) order:  A DNR order is used in case your heart stops beating or you stop breathing. It is a request not to have certain forms of treatment, such as CPR. A DNR order may be included in other types of advance directives.  Medical directive:  This covers the care that you want if you are in a coma, near death, or unable to make decisions for yourself. You can list the treatments you want for each condition. Treatment may include pain medicine, surgery, blood transfusions, dialysis, IV or tube feedings, and a ventilator (breathing machine).  Values history:  This document has questions about your views, beliefs, and how you feel and think about life. This information can help others choose the care that you would choose.  Why are advance directives important?  An advance directive helps you control your care. Although spoken wishes may be used, it is better to have your wishes written down. Spoken wishes can be misunderstood, or not followed. Treatments may be given even if you do not want them. An advance directive may make it easier for your family to make difficult choices about your care.   Weight Management   Why it is important to manage your weight:  Being overweight increases your risk of health conditions such as heart disease, high blood pressure, type 2 diabetes, and certain types of cancer. It can also increase your risk for osteoarthritis, sleep apnea,  and other respiratory problems. Aim for a slow, steady weight loss. Even a small amount of weight loss can lower your risk of health problems.  How to lose weight safely:  A safe and healthy way to lose weight is to eat fewer calories and get regular exercise. You can lose up about 1 pound a week by decreasing the number of calories you eat by 500 calories each day.   Healthy meal plan for weight management:  A healthy meal plan includes a variety of foods, contains fewer calories, and helps you stay healthy. A healthy meal plan includes the following:  Eat whole-grain foods more often.  A healthy meal plan should contain fiber. Fiber is the part of grains, fruits, and vegetables that is not broken down by your body. Whole-grain foods are healthy and provide extra fiber in your diet. Some examples of whole-grain foods are whole-wheat breads and pastas, oatmeal, brown rice, and bulgur.  Eat a variety of vegetables every day.  Include dark, leafy greens such as spinach, kale, fabien greens, and mustard greens. Eat yellow and orange vegetables such as carrots, sweet potatoes, and winter squash.   Eat a variety of fruits every day.  Choose fresh or canned fruit (canned in its own juice or light syrup) instead of juice. Fruit juice has very little or no fiber.  Eat low-fat dairy foods.  Drink fat-free (skim) milk or 1% milk. Eat fat-free yogurt and low-fat cottage cheese. Try low-fat cheeses such as mozzarella and other reduced-fat cheeses.  Choose meat and other protein foods that are low in fat.  Choose beans or other legumes such as split peas or lentils. Choose fish, skinless poultry (chicken or turkey), or lean cuts of red meat (beef or pork). Before you cook meat or poultry, cut off any visible fat.   Use less fat and oil.  Try baking foods instead of frying them. Add less fat, such as margarine, sour cream, regular salad dressing and mayonnaise to foods. Eat fewer high-fat foods. Some examples of high-fat foods  include french fries, doughnuts, ice cream, and cakes.  Eat fewer sweets.  Limit foods and drinks that are high in sugar. This includes candy, cookies, regular soda, and sweetened drinks.  Exercise:  Exercise at least 30 minutes per day on most days of the week. Some examples of exercise include walking, biking, dancing, and swimming. You can also fit in more physical activity by taking the stairs instead of the elevator or parking farther away from stores. Ask your healthcare provider about the best exercise plan for you.      © Copyright [employer] 2018 Information is for End User's use only and may not be sold, redistributed or otherwise used for commercial purposes. All illustrations and images included in CareNotes® are the copyrighted property of [employer]., Inc. or [employer]

## 2025-04-29 NOTE — PROGRESS NOTES
Name: Bola Sanders      : 1950      MRN: [id number]  Encounter Provider: Estefania Minor DO  Encounter Date: 2025   Encounter department: [facility]  :  Assessment & Plan  Medicare annual wellness visit, subsequent  Labs in 6 months       Facial lesion  To dermatology         Elevated PSA  Possible due to bike riding or intercourse  Orders:  •  PSA Total, Diagnostic; Future    Mixed hyperlipidemia    Orders:  •  Comprehensive metabolic panel; Future  •  Lipid Panel with Direct LDL reflex; Future    Acquired hypothyroidism    Orders:  •  TSH, 3rd generation with Free T4 reflex; Future      Depression Screening and Follow-up Plan: Patient was screened for depression during today's encounter. They screened negative with a PHQ-2 score of 0.        Preventive health issues were discussed with patient, and age appropriate screening tests were ordered as noted in patient's After Visit Summary. Personalized health advice and appropriate referrals for health education or preventive services given if needed, as noted in patient's After Visit Summary.    History of Present Illness     Here for wellness  Labs reviewed  Feelping more depressed, but not hopeless  Biking rides are longer  Finding coping skills  Seen by cardiology         Patient Care Team:  Estefania Minor DO as PCP - General (Family Medicine)  Estefania Minor DO as PCP - [facility]CP-[facility]  Alejandro Sandoval MD (Inactive)  Reno Clark MD (Colon and Rectal Surgery)    Review of Systems   Constitutional: Negative.    HENT: Negative.     Eyes: Negative.    Respiratory: Negative.     Cardiovascular: Negative.    Gastrointestinal: Negative.    Endocrine: Negative.    Genitourinary: Negative.    Musculoskeletal:  Positive for arthralgias (stable knee).   Skin:         Facila lesion   Allergic/Immunologic: Negative.    Neurological: Negative.    Psychiatric/Behavioral:  Positive for dysphoric mood.      Medical History  Reviewed by provider this encounter:  Tobacco  Allergies  Meds  Problems  Med Hx  Surg Hx  Fam Hx       Annual Wellness Visit Questionnaire   Bola is here for his Subsequent Wellness visit.     Health Risk Assessment:   Patient rates overall health as fair. Patient feels that their physical health rating is same. Patient is satisfied with their life. Eyesight was rated as same. Hearing was rated as same. Patient feels that their emotional and mental health rating is same. Patients states they are sometimes angry. Patient states they are sometimes unusually tired/fatigued. Pain experienced in the last 7 days has been some. Patient's pain rating has been 3/10. Patient states that he has experienced no weight loss or gain in last 6 months. lost 7 pounds but unfortunately regained 5    Depression Screening:   PHQ-2 Score: 0      Fall Risk Screening:   In the past year, patient has experienced: no history of falling in past year      Home Safety:  Patient has trouble with stairs inside or outside of their home. Patient has working smoke alarms and has no working carbon monoxide detector. Home safety hazards include: none.     Nutrition:   Current diet is Regular, Low Carb, No Added Salt and Limited junk food.     Medications:   Patient is currently taking over-the-counter supplements. OTC medications include: see medication list. Patient is able to manage medications.     Activities of Daily Living (ADLs)/Instrumental Activities of Daily Living (IADLs):   Walk and transfer into and out of bed and chair?: Yes  Dress and groom yourself?: Yes    Bathe or shower yourself?: Yes    Feed yourself? Yes  Do your laundry/housekeeping?: Yes  Manage your money, pay your bills and track your expenses?: Yes  Make your own meals?: Yes    Do your own shopping?: Yes    Previous Hospitalizations:   Any hospitalizations or ED visits within the last 12 months?: No      Advance Care Planning:   Living will: Yes    Durable POA for  healthcare: Yes    Advanced directive: Yes      Cognitive Screening:   Provider or family/friend/caregiver concerned regarding cognition?: No    Preventive Screenings      Cardiovascular Screening:    General: Screening Not Indicated and History Lipid Disorder      Diabetes Screening:     General: Screening Current      Colorectal Cancer Screening:     General: Screening Current      Prostate Cancer Screening:    General: Screening Not Indicated      Abdominal Aortic Aneurysm (AAA) Screening:    Risk factors include: tobacco use        Lung Cancer Screening:     General: Screening Not Indicated      Hepatitis C Screening:    General: Screening Current    Immunizations:  - Immunizations due: Zoster (Shingrix)    Screening, Brief Intervention, and Referral to Treatment (SBIRT)     Screening  Typical number of drinks in a day: 0  Typical number of drinks in a week: 3  Interpretation: Low risk drinking behavior.    AUDIT-C Screenin) How often did you have a drink containing alcohol in the past year? 2 to 3 times a week  2) How many drinks did you have on a typical day when you were drinking in the past year? 1 to 2  3) How often did you have 6 or more drinks on one occasion in the past year? never    AUDIT-C Score: 3  Interpretation: Score 0-3 (male): Negative screen for alcohol misuse    Single Item Drug Screening:  How often have you used an illegal drug (including marijuana) or a prescription medication for non-medical reasons in the past year? never    Single Item Drug Screen Score: 0  Interpretation: Negative screen for possible drug use disorder    Social Drivers of Health     Financial Resource Strain: Low Risk  (2022)    Overall Financial Resource Strain (CARDIA)    • Difficulty of Paying Living Expenses: Not hard at all   Food Insecurity: No Food Insecurity (2025)    Hunger Vital Sign    • Worried About Running Out of Food in the Last Year: Never true    • Ran Out of Food in the Last Year: Never  "true   Transportation Needs: No Transportation Needs (4/27/2025)    PRAPARE - Transportation    • Lack of Transportation (Medical): No    • Lack of Transportation (Non-Medical): No   Housing Stability: Low Risk  (4/27/2025)    Housing Stability Vital Sign    • Unable to Pay for Housing in the Last Year: No    • Number of Times Moved in the Last Year: 0    • Homeless in the Last Year: No   Utilities: Not At Risk (4/27/2025)    [facility] Utilities    • Threatened with loss of utilities: No     No results found.    Objective   /84 (BP Location: Left arm, Patient Position: Sitting, Cuff Size: Standard)   Pulse 67   Temp 98.4 °F (36.9 °C) (Tympanic)   Resp 16   Ht 5' 1\" (1.549 m)   Wt 68.7 kg (151 lb 6.4 oz)   SpO2 97%   BMI 28.61 kg/m²     Physical Exam  Vitals and nursing note reviewed.   Constitutional:       Appearance: Normal appearance. He is well-developed.   HENT:      Head: Normocephalic and atraumatic.      Right Ear: External ear normal.      Left Ear: External ear normal.      Nose: Nose normal.   Eyes:      Extraocular Movements: Extraocular movements intact.      Conjunctiva/sclera: Conjunctivae normal.      Pupils: Pupils are equal, round, and reactive to light.   Cardiovascular:      Rate and Rhythm: Normal rate and regular rhythm.      Heart sounds: Normal heart sounds.   Pulmonary:      Effort: Pulmonary effort is normal.      Breath sounds: Normal breath sounds.   Abdominal:      General: Bowel sounds are normal.      Palpations: Abdomen is soft.   Musculoskeletal:         General: Normal range of motion.      Cervical back: Normal range of motion and neck supple.   Skin:     General: Skin is warm and dry.      Capillary Refill: Capillary refill takes less than 2 seconds.   Neurological:      Mental Status: He is alert and oriented to person, place, and time.   Psychiatric:         Behavior: Behavior normal.         Thought Content: Thought content normal.         Judgment: Judgment normal. "

## 2025-05-29 PROBLEM — Z00.00 MEDICARE ANNUAL WELLNESS VISIT, SUBSEQUENT: Status: RESOLVED | Noted: 2020-11-17 | Resolved: 2025-05-29

## 2025-06-10 DIAGNOSIS — E78.2 MIXED HYPERLIPIDEMIA: ICD-10-CM

## 2025-06-10 RX ORDER — ATORVASTATIN CALCIUM 20 MG/1
20 TABLET, FILM COATED ORAL DAILY
Qty: 90 TABLET | Refills: 1 | Status: SHIPPED | OUTPATIENT
Start: 2025-06-10

## 2025-06-13 VITALS — BODY MASS INDEX: 28.32 KG/M2 | WEIGHT: 150 LBS | HEIGHT: 61 IN

## 2025-06-13 DIAGNOSIS — M25.562 ARTHRALGIA OF BOTH KNEES: ICD-10-CM

## 2025-06-13 DIAGNOSIS — M25.561 ARTHRALGIA OF BOTH KNEES: ICD-10-CM

## 2025-06-13 DIAGNOSIS — M17.0 BILATERAL PRIMARY OSTEOARTHRITIS OF KNEE: Primary | ICD-10-CM

## 2025-06-13 PROCEDURE — 99213 OFFICE O/P EST LOW 20 MIN: CPT | Performed by: ORTHOPAEDIC SURGERY

## 2025-06-13 PROCEDURE — 20610 DRAIN/INJ JOINT/BURSA W/O US: CPT

## 2025-06-13 RX ORDER — BUPIVACAINE HYDROCHLORIDE 2.5 MG/ML
2 INJECTION, SOLUTION INFILTRATION; PERINEURAL
Status: COMPLETED | OUTPATIENT
Start: 2025-06-13 | End: 2025-06-13

## 2025-06-13 RX ORDER — LIDOCAINE HYDROCHLORIDE 5 MG/ML
2 INJECTION, SOLUTION INFILTRATION; PERINEURAL
Status: COMPLETED | OUTPATIENT
Start: 2025-06-13 | End: 2025-06-13

## 2025-06-13 RX ORDER — TRAVOPROST OPHTHALMIC SOLUTION 0.04 MG/ML
1 SOLUTION OPHTHALMIC
COMMUNITY

## 2025-06-13 RX ORDER — BETAMETHASONE SODIUM PHOSPHATE AND BETAMETHASONE ACETATE 3; 3 MG/ML; MG/ML
2 INJECTION, SUSPENSION INTRA-ARTICULAR; INTRALESIONAL; INTRAMUSCULAR; SOFT TISSUE
Status: COMPLETED | OUTPATIENT
Start: 2025-06-13 | End: 2025-06-13

## 2025-06-13 RX ADMIN — LIDOCAINE HYDROCHLORIDE 2 ML: 5 INJECTION, SOLUTION INFILTRATION; PERINEURAL at 10:30

## 2025-06-13 RX ADMIN — BETAMETHASONE SODIUM PHOSPHATE AND BETAMETHASONE ACETATE 2 MG: 3; 3 INJECTION, SUSPENSION INTRA-ARTICULAR; INTRALESIONAL; INTRAMUSCULAR; SOFT TISSUE at 10:30

## 2025-06-13 RX ADMIN — BUPIVACAINE HYDROCHLORIDE 2 ML: 2.5 INJECTION, SOLUTION INFILTRATION; PERINEURAL at 10:30

## 2025-06-13 NOTE — PROGRESS NOTES
"Name: Bola Sanders      : 1950       MRN: [id number]   Encounter Provider: Bob Deleon MD   Encounter Date: 25  Encounter department: [facility] [place]     ASSESSMENT & PLAN:  Assessment & Plan  Bilateral primary osteoarthritis of knee  Diagnosis and treatment options were discussed with patient in office today  Patient has responded well to cortisone injections in the past, was offered repeat injections today, patient accepted and tolerated well.   Ice injection site for the next 24-48 hours  Ice/heat and continue otc nsaids as needed   Patient can repeat injections every 3 months, will call office to reschedule if pain free at the time frame.   Follow up 3 months   Orders:  •  Large joint arthrocentesis: bilateral knee    Arthralgia of both knees       ___________________________________________________      To do next visit:  Return in about 3 months (around 2025).  ____________________________________________________  CHIEF COMPLAINT:  Chief Complaint   Patient presents with   • Left Knee - Follow-up   • Right Knee - Follow-up         SUBJECTIVE:  Bola Sanders is a 74 y.o. male who presents for follow up bilateral knees. He has known bilateral knee osteoarthritis, has been treating non operatively with intermittent cortisone injections. The last injection on 2025 provided significant relief until a couple weeks ago. He noticed most of his anterior discomfort occurs in the morning and going down steps. Patient is wishing to repeat cortisone injections today. Patient is an avid cyclist, likes to bike 70 miles a week.     PAST MEDICAL HISTORY:  Past Medical History[1]    PAST SURGICAL HISTORY:  Past Surgical History[2]    FAMILY HISTORY:  Family History[3]    SOCIAL HISTORY:  Social History[4]    MEDICATIONS:  Current Medications[5]    ALLERGIES:  Allergies[6]    LABS:  HgA1c: No results found for: \"HGBA1C\"  BMP:   Lab Results   Component Value " "Date    CALCIUM 9.4 04/22/2025    K 3.8 04/22/2025    CO2 31 04/22/2025     04/22/2025    BUN 13 04/22/2025    CREATININE 0.99 04/22/2025     CBC: No components found for: \"CBC\"    _____________________________________________________  PHYSICAL EXAMINATION:  Vital signs: Ht 5' 1\" (1.549 m)   Wt 68 kg (150 lb)   BMI 28.34 kg/m²   General: No acute distress, awake and alert  Psychiatric: Mood and affect appear appropriate  HEENT: Trachea Midline, No torticollis, no apparent facial trauma  Cardiovascular: No audible murmurs; Extremities appear perfused  Pulmonary: No audible wheezing or stridor  Skin: Intact, no open lesions; see further details (if any) below    MUSCULOSKELETAL EXAMINATION:    Ortho Exam  Bilateral knee exam  Active range of motion  0-130 with pain and terminal flexion  No erythema, swelling or effusion  Tender to medial joint line  Neurovascularly intact  _____________________________________________________  STUDIES REVIEWED:    None performed:    None performed but reviewed:    The attending physician has personally reviewed the pertinent films in PACS and their interpretation is as follows:    PROCEDURES PERFORMED:    Large joint arthrocentesis: bilateral knee    Performed by: Shayla Ho  Authorized by: Bob Deleon MD    Universal Protocol:  Consent: Verbal consent obtained. Written consent not obtained  Risks and benefits: risks, benefits and alternatives were discussed  Patient understanding: patient states understanding of the procedure being performed  Supporting Documentation  Indications: pain     Is this a Visco injection? NoProcedure Details  Location: knee - bilateral knee  Preparation: Patient was prepped and draped in the usual sterile fashion  Needle size: 22 G  Ultrasound guidance: no  Approach: lateral    Medications (Right): 2 mL bupivacaine 0.25 %; 2 mL lidocaine 0.5 %; 2 mg betamethasone acetate-betamethasone sodium phosphate 6 (3-3) mg/mLMedications " (Left): 2 mL bupivacaine 0.25 %; 2 mL lidocaine 0.5 %; 2 mg betamethasone acetate-betamethasone sodium phosphate 6 (3-3) mg/mL   Patient tolerance: patient tolerated the procedure well with no immediate complications  Dressing:  Sterile dressing applied          Scribe Attestation    I,:  Shayla Ho am acting as a scribe while in the presence of the attending physician.:       I,:  Bob Deleon MD personally performed the services described in this documentation    as scribed in my presence.:                [1]  Past Medical History:  Diagnosis Date   • Acute non-recurrent maxillary sinusitis 04/15/2019   • Allergic    • Angina effort    • Anxiety    • Roberts esophagus 2000   • Roberts's esophagus    • Bleeding from left ear 04/26/2019   • Chest pain 11/07/2019   • Chest pain, unspecified    • Colon polyp    • Cough 02/13/2018   • GERD (gastroesophageal reflux disease) 2000   • Glaucoma    • Hernia 2014   • HL (hearing loss) 10 years ago   • Hyperlipidemia    • Hypertension    • Impacted cerumen of right ear 04/25/2019   • Macular hole, right eye 01/28/2019    Last Assessment & Plan:  Impression:  1. Macular hole, right eye (dx 11/2018 ~295 microns, now 391 micron) 2. PP OD (10/2018) 3. Cataract OS   Plan: - Second opinion. Lives in [place] with plan for surgery at [facility]. - Advocated getting the surgery as the best way to fix the MH - Follow up as needed   • Migraine    • Mild depression 11/26/2019   • Otorrhea, left 04/25/2019   • SBO (small bowel obstruction) ([facility]) 03/23/2019   [2]  Past Surgical History:  Procedure Laterality Date   • CATARACT EXTRACTION     • COLONOSCOPY  2022   • EYE SURGERY     • HERNIA REPAIR Right 2014   • UPPER GASTROINTESTINAL ENDOSCOPY     [3]  Family History  Problem Relation Name Age of Onset   • No Known Problems Mother     • Heart attack Father Stephen         age 83   • Heart disease Father Stephen    • Hearing loss Father Stephen    • Pancreatic cancer Brother M    •  Stroke Paternal Grandmother B    • Breast cancer Paternal Aunt A    • Esophageal cancer Paternal Aunt     • Heart attack Paternal Uncle     [4]  Social History  Tobacco Use   • Smoking status: Former     Current packs/day: 0.00     Average packs/day: 0.3 packs/day for 10.0 years (2.5 ttl pk-yrs)     Types: Cigarettes     Start date: 1968     Quit date: 1978     Years since quittin.4     Passive exposure: Never   • Smokeless tobacco: Never   Vaping Use   • Vaping status: Never Used   Substance Use Topics   • Alcohol use: Yes     Alcohol/week: 4.0 standard drinks of alcohol     Types: 2 Glasses of wine, 2 Shots of liquor per week   • Drug use: Never   [5]    Current Outpatient Medications:   •  amLODIPine (NORVASC) 5 mg tablet, TAKE 1 TABLET BY MOUTH EVERY DAY, Disp: 90 tablet, Rfl: 1  •  aspirin 81 MG tablet, Take by mouth in the morning., Disp: , Rfl:   •  atorvastatin (LIPITOR) 20 mg tablet, TAKE 1 TABLET BY MOUTH EVERY DAY, Disp: 90 tablet, Rfl: 1  •  Cholecalciferol (D3-1000) 1,000 units tablet, Take 1,000 Units by mouth in the morning., Disp: , Rfl:   •  esomeprazole (NexIUM) 20 mg capsule, TAKE 1 CAPSULE BY MOUTH EVERY DAY IN THE EARLY MORNING, Disp: 90 capsule, Rfl: 3  •  latanoprost (XALATAN) 0.005 % ophthalmic solution, Administer 1 drop to both eyes daily at bedtime, Disp: , Rfl:   •  levothyroxine 50 mcg tablet, TAKE 1 TABLET BY MOUTH EVERY DAY ON AN EMPTY STOMACH, Disp: 90 tablet, Rfl: 1  •  Lumigan 0.01 % ophthalmic drops, Administer 1 drop to both eyes daily at bedtime, Disp: , Rfl:   •  methocarbamol (ROBAXIN) 500 mg tablet, Take 1 tablet (500 mg total) by mouth 4 (four) times a day As needed, Disp: 60 tablet, Rfl: 2  •  rizatriptan (MAXALT) 10 mg tablet, Take 1 tablet (10 mg total) by mouth as needed for migraine Take at the onset of migraine; if symptoms continue or return, may take another dose at least 2 hours after first dose. Take no more than 2 doses in a day., Disp: 9 tablet, Rfl:  5  •  timolol (TIMOPTIC) 0.5 % ophthalmic solution, Administer 2 drops to both eyes in the morning., Disp: , Rfl:   •  travoprost (TRAVATAN-Z) 0.004 % ophthalmic solution, 1 drop daily at bedtime, Disp: , Rfl:   •  valsartan (DIOVAN) 160 mg tablet, Take 1 tablet (160 mg total) by mouth daily, Disp: 90 tablet, Rfl: 3[6]  Allergies  Allergen Reactions   • Clindamycin GI Intolerance     Per Patient   • Doxycycline Other (See Comments)     unknown   • Influenza Vaccines Other (See Comments)     Flu like symptoms    • Nitrous Oxide

## 2025-06-13 NOTE — ASSESSMENT & PLAN NOTE
Diagnosis and treatment options were discussed with patient in office today  Patient has responded well to cortisone injections in the past, was offered repeat injections today, patient accepted and tolerated well.   Ice injection site for the next 24-48 hours  Ice/heat and continue otc nsaids as needed   Patient can repeat injections every 3 months, will call office to reschedule if pain free at the time frame.   Follow up 3 months   Orders:  •  Large joint arthrocentesis: bilateral knee

## 2025-06-30 DIAGNOSIS — I10 ESSENTIAL HYPERTENSION: ICD-10-CM

## 2025-07-01 RX ORDER — AMLODIPINE BESYLATE 5 MG/1
5 TABLET ORAL DAILY
Qty: 90 TABLET | Refills: 1 | Status: SHIPPED | OUTPATIENT
Start: 2025-07-01

## 2025-07-10 ENCOUNTER — IOP CHECK (OUTPATIENT)
Dept: URBAN - METROPOLITAN AREA CLINIC 6 | Facility: CLINIC | Age: 75
End: 2025-07-10

## 2025-07-10 DIAGNOSIS — H01.02B: ICD-10-CM

## 2025-07-10 DIAGNOSIS — H40.1132: ICD-10-CM

## 2025-07-10 DIAGNOSIS — H04.123: ICD-10-CM

## 2025-07-10 DIAGNOSIS — H01.02A: ICD-10-CM

## 2025-07-10 PROCEDURE — 92012 INTRM OPH EXAM EST PATIENT: CPT

## 2025-07-10 PROCEDURE — 92133 CPTRZD OPH DX IMG PST SGM ON: CPT

## 2025-07-10 ASSESSMENT — VISUAL ACUITY
OD_SC: 20/30-2
OS_SC: 20/25+1

## 2025-07-10 ASSESSMENT — TONOMETRY
OD_IOP_MMHG: 20
OS_IOP_MMHG: 20

## (undated) RX ORDER — BRINZOLAMIDE 10 MG/ML: 1 SUSPENSION/ DROPS OPHTHALMIC TWICE A DAY

## (undated) RX ORDER — LOTEPREDNOL ETABONATE 10 MG/ML: 1 SUSPENSION TOPICAL

## (undated) RX ORDER — BACITRACIN 500 [USP'U]/G: 1/2 OINTMENT OPHTHALMIC

## (undated) RX ORDER — AMOXICILLIN AND CLAVULANATE POTASSIUM 875; 125 MG/1; 1/1
1 TABLET, FILM COATED ORAL TWICE A DAY
Start: 2025-02-20

## (undated) RX ORDER — LIFITEGRAST 50 MG/ML: 1 SOLUTION/ DROPS OPHTHALMIC TWICE A DAY